# Patient Record
Sex: FEMALE | Race: WHITE | NOT HISPANIC OR LATINO | Employment: OTHER | URBAN - METROPOLITAN AREA
[De-identification: names, ages, dates, MRNs, and addresses within clinical notes are randomized per-mention and may not be internally consistent; named-entity substitution may affect disease eponyms.]

---

## 2017-03-23 ENCOUNTER — TRANSFERRED RECORDS (OUTPATIENT)
Dept: HEALTH INFORMATION MANAGEMENT | Facility: CLINIC | Age: 67
End: 2017-03-23

## 2017-03-23 ENCOUNTER — RECORDS - HEALTHEAST (OUTPATIENT)
Dept: LAB | Facility: CLINIC | Age: 67
End: 2017-03-23

## 2017-03-23 LAB
CHOLEST SERPL-MCNC: 212 MG/DL
FASTING STATUS PATIENT QL REPORTED: ABNORMAL
HDLC SERPL-MCNC: 76 MG/DL
LDLC SERPL CALC-MCNC: 121 MG/DL
TRIGL SERPL-MCNC: 77 MG/DL

## 2018-03-14 ENCOUNTER — RECORDS - HEALTHEAST (OUTPATIENT)
Dept: LAB | Facility: CLINIC | Age: 68
End: 2018-03-14

## 2018-03-14 LAB
CHOLEST SERPL-MCNC: 220 MG/DL
FASTING STATUS PATIENT QL REPORTED: ABNORMAL
HDLC SERPL-MCNC: 68 MG/DL
LDLC SERPL CALC-MCNC: 136 MG/DL
TRIGL SERPL-MCNC: 78 MG/DL

## 2018-04-05 ENCOUNTER — TRANSFERRED RECORDS (OUTPATIENT)
Dept: HEALTH INFORMATION MANAGEMENT | Facility: CLINIC | Age: 68
End: 2018-04-05

## 2018-08-16 ENCOUNTER — RECORDS - HEALTHEAST (OUTPATIENT)
Dept: ADMINISTRATIVE | Facility: OTHER | Age: 68
End: 2018-08-16

## 2018-09-13 ENCOUNTER — RECORDS - HEALTHEAST (OUTPATIENT)
Dept: LAB | Facility: CLINIC | Age: 68
End: 2018-09-13

## 2018-09-13 LAB
CHOLEST SERPL-MCNC: 254 MG/DL
FASTING STATUS PATIENT QL REPORTED: ABNORMAL
HDLC SERPL-MCNC: 76 MG/DL
LDLC SERPL CALC-MCNC: 166 MG/DL
TRIGL SERPL-MCNC: 62 MG/DL
TSH SERPL DL<=0.005 MIU/L-ACNC: 0.87 UIU/ML (ref 0.3–5)

## 2019-03-07 ENCOUNTER — RECORDS - HEALTHEAST (OUTPATIENT)
Dept: LAB | Facility: CLINIC | Age: 69
End: 2019-03-07

## 2019-03-07 LAB
CHOLEST SERPL-MCNC: 194 MG/DL
FASTING STATUS PATIENT QL REPORTED: NORMAL
HDLC SERPL-MCNC: 69 MG/DL
LDLC SERPL CALC-MCNC: 101 MG/DL
TRIGL SERPL-MCNC: 118 MG/DL

## 2019-04-18 ENCOUNTER — TRANSFERRED RECORDS (OUTPATIENT)
Dept: HEALTH INFORMATION MANAGEMENT | Facility: CLINIC | Age: 69
End: 2019-04-18

## 2019-09-10 ENCOUNTER — RECORDS - HEALTHEAST (OUTPATIENT)
Dept: LAB | Facility: CLINIC | Age: 69
End: 2019-09-10

## 2019-09-10 LAB
CHOLEST SERPL-MCNC: 231 MG/DL
FASTING STATUS PATIENT QL REPORTED: ABNORMAL
HDLC SERPL-MCNC: 79 MG/DL
LDLC SERPL CALC-MCNC: 134 MG/DL
TRIGL SERPL-MCNC: 89 MG/DL

## 2019-09-11 LAB — HCV AB SERPL QL IA: NEGATIVE

## 2019-09-25 ENCOUNTER — RECORDS - HEALTHEAST (OUTPATIENT)
Dept: LAB | Facility: CLINIC | Age: 69
End: 2019-09-25

## 2019-09-27 LAB — BACTERIA SPEC CULT: NORMAL

## 2020-05-04 ENCOUNTER — TRANSFERRED RECORDS (OUTPATIENT)
Dept: HEALTH INFORMATION MANAGEMENT | Facility: CLINIC | Age: 70
End: 2020-05-04

## 2020-09-16 ENCOUNTER — RECORDS - HEALTHEAST (OUTPATIENT)
Dept: LAB | Facility: CLINIC | Age: 70
End: 2020-09-16

## 2020-09-16 LAB
ANION GAP SERPL CALCULATED.3IONS-SCNC: 9 MMOL/L (ref 5–18)
BUN SERPL-MCNC: 12 MG/DL (ref 8–28)
CALCIUM SERPL-MCNC: 9.9 MG/DL (ref 8.5–10.5)
CHLORIDE BLD-SCNC: 106 MMOL/L (ref 98–107)
CHOLEST SERPL-MCNC: 228 MG/DL
CO2 SERPL-SCNC: 26 MMOL/L (ref 22–31)
CREAT SERPL-MCNC: 0.81 MG/DL (ref 0.6–1.1)
FASTING STATUS PATIENT QL REPORTED: ABNORMAL
GFR SERPL CREATININE-BSD FRML MDRD: >60 ML/MIN/1.73M2
GLUCOSE BLD-MCNC: 68 MG/DL (ref 70–125)
HDLC SERPL-MCNC: 81 MG/DL
LDLC SERPL CALC-MCNC: 132 MG/DL
POTASSIUM BLD-SCNC: 4.7 MMOL/L (ref 3.5–5)
SODIUM SERPL-SCNC: 141 MMOL/L (ref 136–145)
TRIGL SERPL-MCNC: 75 MG/DL

## 2020-11-16 ENCOUNTER — RECORDS - HEALTHEAST (OUTPATIENT)
Dept: ADMINISTRATIVE | Facility: OTHER | Age: 70
End: 2020-11-16

## 2020-11-18 ENCOUNTER — RECORDS - HEALTHEAST (OUTPATIENT)
Dept: RADIOLOGY | Facility: CLINIC | Age: 70
End: 2020-11-18

## 2020-11-18 ENCOUNTER — AMBULATORY - HEALTHEAST (OUTPATIENT)
Dept: NEUROSURGERY | Facility: CLINIC | Age: 70
End: 2020-11-18

## 2020-12-01 ENCOUNTER — OFFICE VISIT - HEALTHEAST (OUTPATIENT)
Dept: NEUROSURGERY | Facility: CLINIC | Age: 70
End: 2020-12-01

## 2020-12-01 DIAGNOSIS — I67.1 NONRUPTURED CEREBRAL ANEURYSM: ICD-10-CM

## 2020-12-01 ASSESSMENT — MIFFLIN-ST. JEOR: SCORE: 1196.74

## 2020-12-16 ENCOUNTER — RECORDS - HEALTHEAST (OUTPATIENT)
Dept: RADIOLOGY | Facility: CLINIC | Age: 70
End: 2020-12-16

## 2020-12-16 ENCOUNTER — RECORDS - HEALTHEAST (OUTPATIENT)
Dept: ADMINISTRATIVE | Facility: OTHER | Age: 70
End: 2020-12-16

## 2021-05-05 ENCOUNTER — TRANSFERRED RECORDS (OUTPATIENT)
Dept: HEALTH INFORMATION MANAGEMENT | Facility: CLINIC | Age: 71
End: 2021-05-05

## 2021-05-30 ENCOUNTER — RECORDS - HEALTHEAST (OUTPATIENT)
Dept: ADMINISTRATIVE | Facility: CLINIC | Age: 71
End: 2021-05-30

## 2021-05-31 ENCOUNTER — RECORDS - HEALTHEAST (OUTPATIENT)
Dept: ADMINISTRATIVE | Facility: CLINIC | Age: 71
End: 2021-05-31

## 2021-06-02 ENCOUNTER — RECORDS - HEALTHEAST (OUTPATIENT)
Dept: ADMINISTRATIVE | Facility: CLINIC | Age: 71
End: 2021-06-02

## 2021-06-05 VITALS
HEIGHT: 67 IN | WEIGHT: 142 LBS | SYSTOLIC BLOOD PRESSURE: 107 MMHG | DIASTOLIC BLOOD PRESSURE: 51 MMHG | BODY MASS INDEX: 22.29 KG/M2 | HEART RATE: 83 BPM | OXYGEN SATURATION: 95 %

## 2021-06-13 NOTE — PROGRESS NOTES
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  She has a question of a infundibulum of the ACOMM versus a tiny aneurysm there as well.  Patient denies any tobacco abuse family history of cerebral aneurysms, intracranial hemorrhages, homocystinuria, connective-tissue disorders, autosomal dominant polycystic kidney disease, or Marfan's disease.    On physical exam patient is very pleasant and appropriate  Speech is clear fluent and appropriate  Face is symmetric throughout the forehead eyes and mouth  Extraocular muscles are intact bilaterally  Tongue and uvula elevate in the midline  Strength is full throughout the upper and lower extremities  Gait is nonantalgic    Assessment and plan:  Cortney very pleasant 70-year-old with a small finding of aneurysm as detailed above.  I reviewed the natural history of cerebral aneurysms with Cortney as well as the risks of rupture and morbidity and risk factors of rupture including hypertension.  I did review treatment options including surveillance monitoring versus coil embolization versus clipping.  The location of small size of her aneurysm and recommend surveillance monitoring with annual MRAs.  Thank you for giving me the opportunity to see this very pleasant patient.  If you have any questions about her or any other patients please feel free to contact me.  Of note I spent greater than 45 minutes counseling the patient regarding her pathology and treatment plan, greater than 50 percent of which was in direct counseling.    Annabelle Oropeza MD, FAANS

## 2021-06-13 NOTE — PROGRESS NOTES
Neurosurgery consultation was requested by: Dr. Gillespie for cerebral aneurysm. Pt went in for MRI for vertigo and had incidental finding of aneurysm. Pt denies HA or visual changes. Vertigo is gone.   JSkingston,CMA

## 2021-10-13 ENCOUNTER — LAB REQUISITION (OUTPATIENT)
Dept: LAB | Facility: CLINIC | Age: 71
End: 2021-10-13

## 2021-10-13 DIAGNOSIS — E78.2 MIXED HYPERLIPIDEMIA: ICD-10-CM

## 2021-10-13 DIAGNOSIS — R53.83 OTHER FATIGUE: ICD-10-CM

## 2021-10-13 LAB
CHOLEST SERPL-MCNC: 201 MG/DL
FASTING STATUS PATIENT QL REPORTED: ABNORMAL
HDLC SERPL-MCNC: 82 MG/DL
LDLC SERPL CALC-MCNC: 108 MG/DL
TRIGL SERPL-MCNC: 54 MG/DL
TSH SERPL DL<=0.005 MIU/L-ACNC: 0.98 UIU/ML (ref 0.3–5)

## 2021-10-13 PROCEDURE — 84443 ASSAY THYROID STIM HORMONE: CPT | Performed by: FAMILY MEDICINE

## 2021-10-13 PROCEDURE — 86618 LYME DISEASE ANTIBODY: CPT | Performed by: FAMILY MEDICINE

## 2021-10-13 PROCEDURE — 80061 LIPID PANEL: CPT | Performed by: FAMILY MEDICINE

## 2021-10-13 ASSESSMENT — MIFFLIN-ST. JEOR: SCORE: 1192.3

## 2021-10-14 LAB — B BURGDOR IGG+IGM SER QL: 0.03

## 2021-11-23 ENCOUNTER — VIRTUAL VISIT (OUTPATIENT)
Dept: PHARMACY | Facility: PHYSICIAN GROUP | Age: 71
End: 2021-11-23
Payer: COMMERCIAL

## 2021-11-23 VITALS
HEART RATE: 74 BPM | HEIGHT: 67 IN | SYSTOLIC BLOOD PRESSURE: 100 MMHG | DIASTOLIC BLOOD PRESSURE: 70 MMHG | BODY MASS INDEX: 22.44 KG/M2 | WEIGHT: 143 LBS

## 2021-11-23 DIAGNOSIS — F32.A DEPRESSION, UNSPECIFIED DEPRESSION TYPE: ICD-10-CM

## 2021-11-23 DIAGNOSIS — E78.5 HYPERLIPIDEMIA LDL GOAL <100: ICD-10-CM

## 2021-11-23 DIAGNOSIS — N95.1 MENOPAUSAL SYNDROME (HOT FLASHES): Primary | ICD-10-CM

## 2021-11-23 DIAGNOSIS — Z78.9 TAKES DIETARY SUPPLEMENTS: ICD-10-CM

## 2021-11-23 PROCEDURE — 99605 MTMS BY PHARM NP 15 MIN: CPT | Performed by: PHARMACIST

## 2021-11-23 PROCEDURE — 99607 MTMS BY PHARM ADDL 15 MIN: CPT | Performed by: PHARMACIST

## 2021-11-23 RX ORDER — LUTEIN 10 MG
10 TABLET ORAL DAILY
COMMUNITY
End: 2024-05-01

## 2021-11-23 RX ORDER — ACETAMINOPHEN 500 MG
500-1000 TABLET ORAL EVERY 6 HOURS PRN
COMMUNITY
End: 2022-02-08

## 2021-11-23 RX ORDER — COVID-19 ANTIGEN TEST
220 KIT MISCELLANEOUS 2 TIMES DAILY PRN
COMMUNITY
End: 2022-02-08

## 2021-11-23 NOTE — PROGRESS NOTES
Medication Therapy Management (MTM) Encounter    ASSESSMENT:                            Medication Adherence/Access: No issues identified    Hot flashes: Unclear if her hot flashes are from menopause.  She appears to have gone through menopause in her mid 50's, so think the night sweats/hot flashes could be a side effect of her paroxetine.  See depression plan.  If switching to SNRI not successful, could consider adding gabapentin and titrating up.  She did find black cohosh somewhat helpful in the past, could consider trying this again until out next apt.  Would be safe and doesn't interact with any other meds she is taking.    Depression:  Paroxetine has been effective for depression, but possible contributing to her night sweats/hot flashes.  She would prefer to be on the fewest number of medications possible, so switching to another selective serotonin reuptake inhibitor or SNRI would be preferred to adding another medication.   It would be helpful to do the [employer] pharmacogenetic test if not expensive to help guide therapy, given trial on several antidepressants in the past.  One possible options would be desvenlafaxine, which looks like it's covered.  Discussed when to consider this medication transition, and due to possible worsening in mood during transition would prefer to wait until after Dio.  Will try doing the Oneome test now. Called [employer] to confirm, and she would not have a copy for the test.     Possible taper plan:  Week 1:  Decrease paroxetine to 10 mg once daily  Week 2:  Stop Paroxetine.  Start Desvenlafaxine 25 mg once daily.    Week 3:  Increase Desvenlafaxine to 50 mg once daily.    Hyperlipidemia: Stable.  Patient is on low intensity statin which is indicated based on 2019 ACC/AHA guidelines for lipid management.      Pain:  Stable.    Supplements/OTCs:  Stable.      PLAN:                            1.  We will continue your current medications for now.    2.  You could try black  cohosh 40-80 mg once daily again to see if this helps with the hot flashes at night.    3.  I checked with the [employer] genetic test company, and the genetic test we talked about would be $0 for you.  I will havePos have the test mailed to you.  Please complete the test at home, register the test kit with them online or by phone, and then mail the test back to them.     This test helps us look at how your medication processes/metabolizes different medications, and help us find options that would be better tolerated/more effective.     Future Considerations:  4.  We discussed trying to transition off the Paroxetine, and switch to another medication called Desvenlafaxine (Pristiq).  This would be a possible transition plan, but we can discuss it more on 1/4/2022 at our next follow up.  If you would like to try changing your medications before our follow up please let me know.    Week 1: Decrease Paroxetine to 10 mg at bedtime (has 40 mg tablets).  Week 2:  Stop Paroxetine. Start Desvenlafaxine 25 mg once daily.    Week 3:  Increase Desvenlafaxine to 50 mg once daily.      Follow-up: Return in 6 weeks (on 1/4/2022) for MTM Follow Up.      SUBJECTIVE/OBJECTIVE:                          Cortney Nagy is a 71 year old female called for an initial visit. She was referred to me from Dr. Gillepsie.      Reason for visit: Initial medication review.  Referral for help with vasomotor symptoms due to menopause, depression.    Allergies/ADRs: Reviewed in chart  Past Medical History: Reviewed in chart  Tobacco: She reports that she has never smoked. She has never used smokeless tobacco.  Alcohol: rare    Medication Adherence/Access: no issues reported  Doesn't use meclizine, was given this once in the hospital but it doesn't help.  Also hasn't been using premarin, asked for these to be removed from med list.    Hot flashes: She is having a lot of hot flashes, mainly overnight.  Has been going for 10-15 years.  She wakes up drenched  from her waist down, is completely wet. She wakes up like this frequently, several nights per week.  The last three nights have been horrible, was happening every night.  Her last period was 55.  She is frustrated that this is still happening.  She doesn't recall if the hot flashes were worse earlier this summer when she was on a higher dose of paroxetine 40 mg.  She did try black cohosh in her 50's and think it helped some with her hot flashes then.  Initially it seemed like they went away complete, although they did come back so longer term efficacy for her unclear.    Depression:  Current medications include: Paroxetine 20 mg at bedtime.  They had increased to 40 mg for awhile over the summer, but she felt like the lower dose was just as effective so she has been taking a 1/2 tab of the 40 mg for the last several weeks.  She thinks it has been working well for depression.  She has been doing very well with her depression the last couple years.  Goes on walks a few days a week, spends time with friends and family.  She is wondering if paroxetine could be contributing to her night sweats, is a possible side effect.  She also has dry eyes and constipation.  Reports some issues with short term memory, mostly word recall.  She has been on a many medications in the past for depression, but had decreased libido with most of them.     Past medications:  Fluoxetine - sexual side effects, was effective and helped with her anxiety/depression.  Citalopram- sexual side effects  Welbutrin- unsure what happened with this  Serzone- sedated.    Sertraline - sexual side effects, decreased libido  Effexor ER 75 mg - some sexual side effects.  Doesn't recall GI side effects, but thinks it wasn't working as well for her depression    PHQ 10/13/2021   PHQ-9 Total Score 0   Q9: Thoughts of better off dead/self-harm past 2 weeks Not at all       Hyperlipidemia: Current therapy includes Pravastatin 10 mg daily.  Patient reports no  significant myalgias or other side effects.  The 10-year ASCVD risk score (Christasofia LOMAX Jr., et al., 2013) is: 6.3%    Values used to calculate the score:      Age: 71 years      Sex: Female      Is Non- : No      Diabetic: No      Tobacco smoker: No      Systolic Blood Pressure: 100 mmHg      Is BP treated: No      HDL Cholesterol: 82 mg/dL      Total Cholesterol: 201 mg/dL  Recent Labs   Lab Test 10/13/21  1107 09/16/20  1008   CHOL 201* 228*   HDL 82 81    132*   TRIG 54 75     Pain:  Takes acetaminophen 500 mg as needed for pain, or Aleve 220 mg PRN.  Not using daily, just for general aches/pains.  Has some back back.    Supplements/OTCs: Taking Coenzyme Q-10 100 mg daily, Fish Oil 1000 mg daily, Calcium-magnesium- zinc once daily.  Denies issues.    ----------------      I spent 60 minutes with this patient today (an extra 15 minutes was spent creating the Medication Action Plan). All changes were made via collaborative practice agreement with Lynn Gillespie MD. A copy of the visit note was provided to the patient's primary care provider.    The patient was mailed a summary of these recommendations.     Melania Magallanes PharmD  Medication Therapy Management Pharmacist  Pager: [phone]      Telemedicine Visit Details  Type of service:  Telephone visit  Start Time: 10:00 AM  End Time: 11:00 AM  Originating Location (patient location): [place]  Distant Location (provider location):  [facility]     Medication Therapy Recommendations  Depression, unspecified depression type    Current Medication: PARoxetine (PAXIL) 40 MG tablet   Rationale: Medication requires monitoring - Needs additional monitoring   Recommendation: Order Lab   Status: Accepted per CPA   Note: [facility] test

## 2021-11-23 NOTE — LETTER
"        Date: 2021    Cortney Nagy  [address] [facility] [address]    Dear Ms. Nagy,    Thank you for talking with me on 2021 about your health and medications. Medicare s MTM (Medication Therapy Management) program helps you understand your medications and use them safely.      This letter includes an action plan (Medication Action Plan) and medication list (Personal Medication List). The action plan has steps you should take to help you get the best results from your medications. The medication list will help you keep track of your medications and how to use them the right way.       Have your action plan and medication list with you when you talk with your doctors, pharmacists, and other healthcare providers in your care team.     Ask your doctors, pharmacists, and other healthcare providers to update the action plan and medication list at every visit.     Take your medication list with you if you go to the hospital or emergency room.     Give a copy of the action plan and medication list to your family or caregivers.     If you want to talk about this letter or any of the papers with it, please call   [phone].We look forward to working with you, your doctors, and other healthcare providers to help you stay healthy through the [employer] [place] MTM program.    Sincerely,  Melania Magallanes RPH    Enclosed: Medication Action Plan and Personal Medication List    MEDICATION ACTION PLAN FOR Cortney Nagy,  1950     This action plan will help you get the best results from your medications if you:   1. Read \"What we talked about.\"   2. Take the steps listed in the \"What I need to do\" boxes.   3. Fill in \"What I did and when I did it.\"   4. Fill in \"My follow-up plan\" and \"Questions I want to ask.\"     Have this action plan with you when you talk with your doctors, pharmacists, and other healthcare providers in your care team. Share this with your family or " caregivers too.  DATE PREPARED: 2021  What we talked about: Possible options to help with your hot flashes                                                  What I need to do: Continue current medications for now.  You could try the black cohosh supplement again to see if this helps.  I will place an order for the [employer] Genetic test and have it mailed to your house.    At our next follow up on 2022 we can discuss possible transitioning off the paroxetine onto another medication called desvenlafaxine.  We will continue your medications the same for now, but the paroxetine might be causing the hot flashes/sweating at night.  We discussed holding off on this medication change due to the upcoming holidays.     What I did and when I did it:                                              My follow-up plan:                 Questions I want to ask:              If you have any questions about your action plan, call Melania Magallanes RPH, Phone: [phone] , Monday-Friday 8-4:30pm.           PERSONAL MEDICATION LIST FOR Cortney Nagy  1950     This medication list was made for you after we talked. We also used information from your doctor's chart.      Use blank rows to add new medications. Then fill in the dates you started using them.    Cross out medications when you no longer use them. Then write the date and why you stopped using them.    Ask your doctors, pharmacists, and other healthcare providers to update this list at every visit. Keep this list up-to-date with:       Prescription medications    Over the counter drugs     Herbals    Vitamins    Minerals      If you go to the hospital or emergency room, take this list with you. Share this with your family or caregivers too.     DATE PREPARED: 2021  Allergies or side effects: Codeine, Penicillins, Propoxyphene n-acetaminophen [propoxyphene n-apap], and Sulfa (sulfonamide antibiotics) [sulfa drugs]     Medication:  ACETAMINOPHEN 500 MG PO  TABS      How I use it:  Take 500-1,000 mg by mouth every 6 hours as needed for mild pain      Why I use it:  Pain    Prescriber:  Yeni Rowland       Date I started using it:       Date I stopped using it:         Why I stopped using it:            Medication:  CALCIUM-MAGNESIUM-ZINC 333-133-5 MG PO TABS      How I use it:  Take 1 tablet by mouth daily      Why I use it:  General Health    Prescriber:  Yeni Rowland      Date I started using it:       Date I stopped using it:         Why I stopped using it:            Medication:  LUTEIN 10 MG PO TABS      How I use it:  Take 10 mg by mouth daily      Why I use it: [facility]    Prescriber:  Nasimate Janett      Date I started using it:       Date I stopped using it:         Why I stopped using it:            Medication:  NAPROXEN SODIUM 220 MG PO CAPS      How I use it:  Take 220 mg by mouth 2 times daily as needed      Why I use it:  Pain    Prescriber:  Yeni Rowland      Date I started using it:       Date I stopped using it:         Why I stopped using it:            Medication:  PAROXETINE HCL 40 MG PO TABS      How I use it:  Take 20 mg by mouth daily       Why I use it:  Depression    Prescriber:  Yeni Rowland      Date I started using it:       Date I stopped using it:         Why I stopped using it:            Medication:  coenzyme Q10 100 mg capsule      How I use it:  Take 100 mg by mouth daily.      Why I use it:  General health    Prescriber:  Yeni Rowland      Date I started using it:       Date I stopped using it:         Why I stopped using it:            Medication:  omega-3/dha/epa/fish oil (FISH OIL-OMEGA-3 FATTY ACIDS) 300-1,000 mg capsule      How I use it:  Take 1 g by mouth daily.      Why I use it:  General Health    Prescriber:  Yeni Rowland      Date I started using it:       Date I stopped using it:         Why I stopped using it:            Medication:  pravastatin (PRAVACHOL) 20 MG tablet      How I use it:   Take 10 mg by mouth  at bedtime. Every other day             Why I use it:  Cholesterol    Prescriber:  Yeni Rowland      Date I started using it:       Date I stopped using it:         Why I stopped using it:            Medication:         How I use it:         Why I use it:      Prescriber:         Date I started using it:       Date I stopped using it:         Why I stopped using it:            Medication:         How I use it:         Why I use it:      Prescriber:         Date I started using it:       Date I stopped using it:         Why I stopped using it:            Medication:         How I use it:         Why I use it:      Prescriber:         Date I started using it:       Date I stopped using it:         Why I stopped using it:              Other Information:     If you have any questions about your medication list, call Melania Magallanes RPH, Phone: [phone] , Monday-Friday 8-4:30pm.    According to the Paperwork Reduction Act of 1995, no persons are required to respond to a collection of information unless it displays a valid OMB control number. The valid OMB number for this information collection is 0697-0203. The time required to complete this information collection is estimated to average 40 minutes per response, including the time to review instructions, searching existing data resources, gather the data needed, and complete and review the information collection. If you have any comments concerning the accuracy of the time estimate(s) or suggestions for improving this form, please write to: CMS, Attn: PAR[employer] Reports Clearance Officer, [address] [place] [id number].

## 2021-11-24 ASSESSMENT — PATIENT HEALTH QUESTIONNAIRE - PHQ9: SUM OF ALL RESPONSES TO PHQ QUESTIONS 1-9: 0

## 2021-11-24 NOTE — PATIENT INSTRUCTIONS
Recommendations from today's MTM visit:                                                    MTM (medication therapy management) is a service provided by a clinical pharmacist designed to help you get the most of out of your medicines.   Today we reviewed what your medicines are for, how to know if they are working, that your medicines are safe and how to make your medicine regimen as easy as possible.      1.  We will continue your current medications for now.    2.  You could try black cohosh 40-80 mg once daily again to see if this helps with the hot flashes at night.    3.  I checked with the [employer] genetic test company, and the genetic test we talked about would be $0 for you.  I will have have the test mailed to you.  Please complete the test at home, register the test kit with them online or by phone, and then mail the test back to them.     This test helps us look at how your medication processes/metabolizes different medications, and help us find options that would be better tolerated/more effective.     Future Considerations:  4.  We discussed trying to transition off the Paroxetine, and switch to another medication called Desvenlafaxine (Pristiq).  This would be a possible transition plan, but we can discuss it more on 1/4/2022 at our next follow up.  If you would like to try changing your medications before our follow up please let me know.    Week 1: Decrease Paroxetine to 10 mg at bedtime (has 40 mg tablets).  Week 2:  Stop Paroxetine. Start Desvenlafaxine 25 mg once daily.    Week 3:  Increase Desvenlafaxine to 50 mg once daily.      Follow-up: Return in 6 weeks (on 1/4/2022) for MTM Follow Up.  Please call me sooner if you have any concerns, questions, or would like to start the paroxetine transition sooner than our 1/4 appointment!    It was great to speak with you today.  I value your experience and would be very thankful for your time with providing feedback on our clinic survey. You may receive a  survey via email or text message in the next few days.     To schedule another MTM appointment, please call the clinic directly or you may call the MTM scheduling line at [phone] or toll-free at [phone].     My Clinical Pharmacist's contact information:                                                      Please feel free to contact me with any questions or concerns you have.     Melania Magallanes PharmD  Medication Therapy Management Pharmacist  Pager: [phone]

## 2021-11-30 DIAGNOSIS — I67.1 NONRUPTURED CEREBRAL ANEURYSM: Primary | ICD-10-CM

## 2021-12-08 ENCOUNTER — LAB REQUISITION (OUTPATIENT)
Dept: LAB | Facility: CLINIC | Age: 71
End: 2021-12-08

## 2021-12-08 DIAGNOSIS — E78.2 MIXED HYPERLIPIDEMIA: ICD-10-CM

## 2021-12-08 LAB
ANION GAP SERPL CALCULATED.3IONS-SCNC: 12 MMOL/L (ref 5–18)
BUN SERPL-MCNC: 12 MG/DL (ref 8–28)
CALCIUM SERPL-MCNC: 9.7 MG/DL (ref 8.5–10.5)
CHLORIDE BLD-SCNC: 103 MMOL/L (ref 98–107)
CO2 SERPL-SCNC: 25 MMOL/L (ref 22–31)
CREAT SERPL-MCNC: 0.81 MG/DL (ref 0.6–1.1)
GFR SERPL CREATININE-BSD FRML MDRD: 73 ML/MIN/1.73M2
GLUCOSE BLD-MCNC: 79 MG/DL (ref 70–125)
POTASSIUM BLD-SCNC: 4.5 MMOL/L (ref 3.5–5)
SODIUM SERPL-SCNC: 140 MMOL/L (ref 136–145)

## 2021-12-08 PROCEDURE — 80048 BASIC METABOLIC PNL TOTAL CA: CPT | Performed by: PHYSICIAN ASSISTANT

## 2021-12-20 ENCOUNTER — LAB REQUISITION (OUTPATIENT)
Dept: LAB | Facility: CLINIC | Age: 71
End: 2021-12-20

## 2021-12-20 DIAGNOSIS — R35.0 FREQUENCY OF MICTURITION: ICD-10-CM

## 2021-12-20 PROCEDURE — 87086 URINE CULTURE/COLONY COUNT: CPT | Performed by: PHYSICIAN ASSISTANT

## 2021-12-21 LAB — BACTERIA UR CULT: ABNORMAL

## 2022-01-05 ENCOUNTER — VIRTUAL VISIT (OUTPATIENT)
Dept: PHARMACY | Facility: PHYSICIAN GROUP | Age: 72
End: 2022-01-05
Payer: COMMERCIAL

## 2022-01-05 ENCOUNTER — TELEPHONE (OUTPATIENT)
Dept: NEUROSURGERY | Facility: CLINIC | Age: 72
End: 2022-01-05
Payer: COMMERCIAL

## 2022-01-05 DIAGNOSIS — Z78.9 TAKES DIETARY SUPPLEMENTS: ICD-10-CM

## 2022-01-05 DIAGNOSIS — N95.1 MENOPAUSAL SYNDROME (HOT FLASHES): Primary | ICD-10-CM

## 2022-01-05 DIAGNOSIS — E78.5 HYPERLIPIDEMIA LDL GOAL <100: ICD-10-CM

## 2022-01-05 DIAGNOSIS — F32.A DEPRESSION, UNSPECIFIED DEPRESSION TYPE: ICD-10-CM

## 2022-01-05 PROCEDURE — 99605 MTMS BY PHARM NP 15 MIN: CPT | Performed by: PHARMACIST

## 2022-01-05 PROCEDURE — 99607 MTMS BY PHARM ADDL 15 MIN: CPT | Performed by: PHARMACIST

## 2022-01-05 RX ORDER — DESVENLAFAXINE 25 MG/1
25 TABLET, EXTENDED RELEASE ORAL DAILY
COMMUNITY
End: 2022-02-08

## 2022-01-05 NOTE — LETTER
_  Medication List        Prepared on: 1/5/2022     Bring your Medication List when you go to the doctor, hospital, or   emergency room. And, share it with your family or caregivers.     Note any changes to how you take your medications.  Cross out medications when you no longer use them.    Medication How I take it Why I use it Prescriber   acetaminophen (TYLENOL) 500 MG tablet Take 500-1,000 mg by mouth every 6 hours as needed for mild pain Pain Dr. Gillespie   Calcium-Magnesium-Zinc 333-133-5 MG TABS per tablet Take 1 tablet by mouth daily General Health Dr. Gillespie   coenzyme Q10 100 mg capsule Take 100 mg by mouth daily. General Health Dr. Gillespie   desvenlafaxine succinate (PRISTIQ) 25 MG 24 hr tablet Take 25 mg by mouth daily Depression Dr. Gillespie   Lutein 10 MG TABS Take 10 mg by mouth daily General Health Dr. Gillespie   naproxen sodium 220 MG capsule Take 220 mg by mouth 2 times daily as needed Pain Dr. Gillespie   omega-3/dha/epa/fish oil (FISH OIL-OMEGA-3 FATTY ACIDS) 300-1,000 mg capsule  Take 1 g by mouth daily. General Health Dr. Gillespie   pravastatin (PRAVACHOL) 20 MG tablet  Take 10 mg by mouth at bedtime. Every other day        High Cholesterol Dr. Gillespie         Add new medications, over-the-counter drugs, herbals, vitamins, or  minerals in the blank rows below.    Medication How I take it Why I use it Prescriber                          Allergies:      codeine; penicillins; propoxyphene n-acetaminophen [propoxyphene n-apap]; sulfa (sulfonamide antibiotics) [sulfa drugs]        Side effects I have had:              Other Information:             My notes and questions:

## 2022-01-05 NOTE — TELEPHONE ENCOUNTER
Patient would like a call back to discuss the order that was placed for the angiogram.     Best number to reach her: [phone]

## 2022-01-05 NOTE — TELEPHONE ENCOUNTER
Called Cortney who inquired about sending her all medical records - the number was provided.  Nicolasa Grider RN, CNRN

## 2022-01-05 NOTE — LETTER
January 5, 2022  Cortney Nagy  [address] [facility] [address] [address]    Dear Ms. Nagy, [facility]        Thank you for talking with me on Jan 5, 2022 about your health and medications. As a follow-up to our conversation, I have included two documents:      1. Your Recommended To-Do List has steps you should take to get the best results from your medications.  2. Your Medication List will help you keep track of your medications and how to take them.    If you want to talk about these documents, please call Melanai Magallanes RPH at phone: [phone], Monday-Friday 8-4:30pm.    I look forward to working with you and your doctors to make sure your medications work well for you.    Sincerely,    Melania Magallanes RPH

## 2022-01-05 NOTE — LETTER
January 5, 2022  Cortney Nagy  [address] [facility] [address]    Dear Ms. Nagy, [facility]        Thank you for talking with me on Jan 5, 2022 about your health and medications. As a follow-up to our conversation, I have included two documents:      1. Your Recommended To-Do List has steps you should take to get the best results from your medications.  2. Your Medication List will help you keep track of your medications and how to take them.    If you want to talk about these documents, please call Melania Magallanes RPH at phone: [phone], Monday-Friday 8-4:30pm.    I look forward to working with you and your doctors to make sure your medications work well for you.    Sincerely,    Melania Magallanes RPH

## 2022-01-05 NOTE — LETTER
"Recommended To-Do List      Prepared on: 1/5/2022     You can get the best results from your medications by completing the items on this \"To-Do List.\"      Bring your To-Do List when you go to your doctor. And, share it with your family or caregivers.    My To-Do List:  What we talked about: What I should do:   An issue with your medication    Change the medication you are taking from PARoxetine (PAXIL) to desvenlafaxine succinate (PRISTIQ)  Week 1:  Decrease paroxetine to 10 mg once daily (1/4 tablet daily)  Week 2:  Stop Paroxetine.  Start Desvenlafaxine 25 mg once daily.                What we talked about: What I should do:                   "

## 2022-01-05 NOTE — LETTER
January 5, 2022  Cortney Nagy  [address] [facility] [address]    Dear Ms. Nagy, [facility]        Thank you for talking with me on Jan 5, 2022 about your health and medications. As a follow-up to our conversation, I have included two documents:      1. Your Recommended To-Do List has steps you should take to get the best results from your medications.  2. Your Medication List will help you keep track of your medications and how to take them.    If you want to talk about these documents, please call Melania Magallanes RPH at phone: [phone], Monday-Friday 8-4:30pm.    I look forward to working with you and your doctors to make sure your medications work well for you.    Sincerely,    Melania Magallanes RPH

## 2022-01-05 NOTE — LETTER
"Recommended To-Do List      Prepared on: 1/5/2022     You can get the best results from your medications by completing the items on this \"To-Do List.\"      Bring your To-Do List when you go to your doctor. And, share it with your family or caregivers.    My To-Do List:  What we talked about: What I should do:   An issue with your medication    Change the medication you are taking from PARoxetine (PAXIL) to desvenlafaxine succinate (PRISTIQ)  Transition plan:  Week 1: Decrease Paroxetine to 10 mg at bedtime (has 40 mg tablets).  Week 2:  Stop Paroxetine. Start Desvenlafaxine 25 mg once daily.           What we talked about: What I should do:                   "

## 2022-01-05 NOTE — PROGRESS NOTES
Medication Therapy Management (MTM) Encounter    ASSESSMENT:                            Medication Adherence/Access: No issues identified    Hot flashes: Unclear if her hot flashes are from menopause.  She appears to have gone through menopause in her mid 50's, so the night sweats/hot flashes could be a side effect of her paroxetine.  See depression plan.  If switching to SNRI not successful, could consider adding gabapentin and titrating up.       Depression:  Paroxetine has been effective for depression, but possibly contributing to her night sweats/hot flashes and worsening memory.  She would like to switch to something else.  Reviewed the Oneome test results with the patient.  Has tried several meds with side effects.  May be beneficial to try SNRI to help with depression and hot flashes.  If not tolerating could consider bupropion for depression (lower risk of sexual side effects).  Discussed possible side effect with desvenlafaxine- nausea, dry mouth, dizziness, and decreased libido.    Possible taper plan:  Week 1:  Decrease paroxetine to 10 mg once daily (pt tried cutting into 1/4 tablets and didn't have issues with this)  Week 2:  Stop Paroxetine.  Start Desvenlafaxine 25 mg once daily.    Week 3:  Increase Desvenlafaxine to 50 mg once daily.     Hyperlipidemia: Stable.  Patient is on low intensity statin which is indicated based on 2019 ACC/AHA guidelines for lipid management.  we could consider trial off pravastatin in the future, to see if her      Pain:  Stable.     Supplements/OTCs:  Stable.      PLAN:                            1.  Recommend transitioning from paroxetine to desvenlafaxine (Pristiq) for depression.    Transition plan:  Week 1: Decrease Paroxetine to 10 mg at bedtime (has 40 mg tablets).  Week 2:  Stop Paroxetine. Start Desvenlafaxine 25 mg once daily.    Week 3:  Increase Desvenlafaxine to 50 mg once daily.    Follow-up: Return in 1 month (on 2/8/2022) for MTM Follow Up, Diabetes  Follow Up.    SUBJECTIVE/OBJECTIVE:                          Cortney Nagy is a 71 year old female called for a follow-up visit. She was referred to me from Dr. Gillespie.  Today's visit is a follow-up MTM visit from 21    Reason for visit: Follow-up on depression and anxiety.  Also reviewing results from the Oneome pharmacodynamic test.     Allergies/ADRs: Reviewed in chart  Past Medical History: Reviewed in chart  Tobacco: She reports that she has never smoked. She has never used smokeless tobacco.  Alcohol: rare     Medication Adherence/Access: no issues reported  Doesn't use meclizine, was given this once in the hospital but it doesn't help.  Also hasn't been using premarin, asked for these to be removed from med list.    She is having some trouble with remembering words, feels like her short term memory isn't as good.  She is wondering if this could be from the paroxetine or pravastatin.  Reviewed Oneome results with patient.  Discussed that results will not tell us which drugs will work but can give us some insight into dosing and side effects based on individual metabolism of each medication.   Given current and previous therapy, notable results include:     Pharmacogenetic Results:  OneOme testing conducted and reported on .        Gene Phenotype Current Medications Impacted   CY Rapid Metabolizer  None, primarily affects antipsychotics   CYP2D6 Intermediate Metabolizer  Paroxetine; patient likely has higher drug levels compared to others on current dose.  Adjustments not needed based on this information, but can adjust therapy if needed based on efficacy/side effects   CY Poor Metabolizer None, affects some statins, pain medications, antipsychotics, and urology medications.   CYP4F2 Reduced Activity  None, affects warfarin metabolism   MTHFR Reduced Activity  may have decreased conversion of folic acid to active form of l-methylfolate   COMT Low Activity None, affects stimulants and  warfarin   NUDT15 Intermediate Metabolizer None, increased risk for thiopurine medications   WZWV7S9 Decreased function Pravastatin; increased risk of side effects with this medication.  Not currently an issue but will monitor.  Could consider changing to rosuvastatin if needed   UGT1A1 Intermediate Metabolizer  None, increased risk of side effects with certain oncology and hematology medications       Interpretations:  - Patient likely metabolizes paroxetine more slowly than others, and may have higher drug levels at current dose then other patients.  No need to change dose based on this information, only on her response/side effects.  -  She may be less likely to find certain pain meds effects (I.e. alfentanyl, codeine, tramadol).      Hot flashes: She is having a lot of hot flashes, mainly overnight.  Has been going for 10-15 years.  She wakes up drenched from her waist down, is completely wet. She wakes up like this frequently, several nights per week.  The last three nights have been horrible, was happening every night.  Her last period was 55.  She is frustrated that this is still happening.  She doesn't recall if the hot flashes were worse earlier this summer when she was on a higher dose of paroxetine 40 mg.  She did try black cohosh in her 50's and think it helped some with her hot flashes then.  Initially it seemed like they went away complete, although they did come back so longer term efficacy for her unclear.     Depression:  Current medications include: Paroxetine 20 mg at bedtime.    She has been doing very well with her depression the last couple years.  Goes on walks a few days a week, spends time with friends and family.  She had a difficult time at [place].  She said she sometimes over reacts to things, so had some difficulty.    She is wondering if paroxetine could be contributing to her night sweats and memory issues, is a possible side effect.  She also has dry eyes and constipation.  Reports  some issues with short term memory, mostly word recall.  She has been on a many medications in the past for depression, but had decreased libido with most of them.  She has concerned not taking any medications for her depression, and would prefer not to take any medications.     Past medications:  Fluoxetine - sexual side effects, was effective and helped with her anxiety/depression.  It worked very well.  Citalopram- sexual side effects  Welbutrin- unsure what happened with this  Serzone- sedated.    Sertraline - sexual side effects, decreased libido  Effexor ER 75 mg - some sexual side effects.  Doesn't recall GI side effects, but thinks it wasn't working as well for her depression     PHQ 10/13/2021   PHQ-9 Total Score 0   Q9: Thoughts of better off dead/self-harm past 2 weeks Not at all      Hyperlipidemia: Current therapy includes Pravastatin 10 mg daily.  Patient reports no significant myalgias or other side effects.  Patient is wondering if the pravastatin is affecting her memory.  The 10-year ASCVD risk score (Christa DAMI Jr., et al., 2013) is: 6.3%    Values used to calculate the score:      Age: 71 years      Sex: Female      Is Non- : No      Diabetic: No      Tobacco smoker: No      Systolic Blood Pressure: 100 mmHg      Is BP treated: No      HDL Cholesterol: 82 mg/dL      Total Cholesterol: 201 mg/dL       Recent Labs   Lab Test 10/13/21  1107 09/16/20  1008   CHOL 201* 228*   HDL 82 81    132*   TRIG 54 75      Pain:  Takes acetaminophen 500 mg as needed for pain, or Aleve 220 mg PRN.  Not using daily, just for general aches/pains.  Has some back back.     Supplements/OTCs: Taking Coenzyme Q-10 100 mg daily, Fish Oil 1000 mg daily, Calcium-magnesium-zinc once daily.  Denies issues.    ----------------      I spent 60 minutes with this patient today. All changes were made via collaborative practice agreement with Lynn Gillespie MD. A copy of the visit note was provided to the  patient's primary care provider.    The patient was mailed a summary of these recommendations.     Melania Magallanes PharmD  Medication Therapy Management Pharmacist  Pager: [phone]      Telemedicine Visit Details  Type of service:  Telephone visit  Start Time: 10:00  End Time: 10:58 AM  Originating Location (patient location): [place]  Distant Location (provider location):  [facility]     Medication Therapy Recommendations  Depression, unspecified depression type    Current Medication: PARoxetine (PAXIL) 40 MG tablet   Rationale: Undesirable effect - Adverse medication event - Safety   Recommendation: Change Medication - desvenlafaxine succinate 25 MG 24 hr tablet   Status: Accepted per CPA              I spent 60 minutes with this patient today (an extra 15 minutes was spent creating the Medication Action Plan).

## 2022-01-05 NOTE — LETTER
_  Medication List        Prepared on: 1/5/2022     Bring your Medication List when you go to the doctor, hospital, or   emergency room. And, share it with your family or caregivers.     Note any changes to how you take your medications.  Cross out medications when you no longer use them.    Medication How I take it Why I use it Prescriber   acetaminophen (TYLENOL) 500 MG tablet Take 500-1,000 mg by mouth every 6 hours as needed for mild pain  Patient Reported   Calcium-Magnesium-Zinc 333-133-5 MG TABS per tablet Take 1 tablet by mouth daily  Patient Reported   coenzyme Q10 100 mg capsule [COENZYME Q10 100 MG CAPSULE] Take 100 mg by mouth daily.  Historical Provider   Lutein 10 MG TABS Take 10 mg by mouth daily  Patient Reported   naproxen sodium 220 MG capsule Take 220 mg by mouth 2 times daily as needed  Patient Reported   omega-3/dha/epa/fish oil (FISH OIL-OMEGA-3 FATTY ACIDS) 300-1,000 mg capsule [OMEGA-3/DHA/EPA/FISH OIL (FISH OIL-OMEGA-3 FATTY ACIDS) 300-1,000 MG CAPSULE] Take 1 g by mouth daily.  Historical Provider   PARoxetine (PAXIL) 40 MG tablet Take 20 mg by mouth daily   Historical Provider   pravastatin (PRAVACHOL) 20 MG tablet [PRAVASTATIN (PRAVACHOL) 20 MG TABLET] Take 10 mg by mouth at bedtime. Every other day         Historical Provider         Add new medications, over-the-counter drugs, herbals, vitamins, or  minerals in the blank rows below.    Medication How I take it Why I use it Prescriber                          Allergies:      codeine; penicillins; propoxyphene n-acetaminophen [propoxyphene n-apap]; sulfa (sulfonamide antibiotics) [sulfa drugs]        Side effects I have had:              Other Information:             My notes and questions:

## 2022-01-15 ENCOUNTER — HEALTH MAINTENANCE LETTER (OUTPATIENT)
Age: 72
End: 2022-01-15

## 2022-02-08 ENCOUNTER — LAB REQUISITION (OUTPATIENT)
Dept: LAB | Facility: CLINIC | Age: 72
End: 2022-02-08

## 2022-02-08 ENCOUNTER — OFFICE VISIT (OUTPATIENT)
Dept: PHARMACY | Facility: PHYSICIAN GROUP | Age: 72
End: 2022-02-08
Payer: COMMERCIAL

## 2022-02-08 VITALS
SYSTOLIC BLOOD PRESSURE: 100 MMHG | WEIGHT: 143 LBS | HEART RATE: 70 BPM | DIASTOLIC BLOOD PRESSURE: 72 MMHG | BODY MASS INDEX: 22.44 KG/M2 | HEIGHT: 67 IN

## 2022-02-08 DIAGNOSIS — M85.80 OTHER SPECIFIED DISORDERS OF BONE DENSITY AND STRUCTURE, UNSPECIFIED SITE: ICD-10-CM

## 2022-02-08 DIAGNOSIS — E78.5 HYPERLIPIDEMIA LDL GOAL <100: ICD-10-CM

## 2022-02-08 DIAGNOSIS — F32.A DEPRESSION, UNSPECIFIED DEPRESSION TYPE: ICD-10-CM

## 2022-02-08 DIAGNOSIS — N95.1 MENOPAUSAL SYNDROME (HOT FLASHES): Primary | ICD-10-CM

## 2022-02-08 DIAGNOSIS — Z78.9 TAKES DIETARY SUPPLEMENTS: ICD-10-CM

## 2022-02-08 PROCEDURE — 82306 VITAMIN D 25 HYDROXY: CPT | Performed by: FAMILY MEDICINE

## 2022-02-08 PROCEDURE — 99607 MTMS BY PHARM ADDL 15 MIN: CPT | Performed by: PHARMACIST

## 2022-02-08 PROCEDURE — 99606 MTMS BY PHARM EST 15 MIN: CPT | Performed by: PHARMACIST

## 2022-02-08 RX ORDER — AMOXICILLIN 500 MG
1200 CAPSULE ORAL DAILY
COMMUNITY
End: 2024-05-01

## 2022-02-08 RX ORDER — PAROXETINE 10 MG/1
10 TABLET, FILM COATED ORAL AT BEDTIME
COMMUNITY
End: 2024-09-05 | Stop reason: DRUGHIGH

## 2022-02-08 ASSESSMENT — MIFFLIN-ST. JEOR: SCORE: 1192.3

## 2022-02-08 NOTE — LETTER
February 8, 2022  Cortney Nagy  [address] [facility] [address]    Dear Ms. Nagy, [facility]        Thank you for talking with me on Feb 8, 2022 about your health and medications. As a follow-up to our conversation, I have included two documents:      1. Your Recommended To-Do List has steps you should take to get the best results from your medications.  2. Your Medication List will help you keep track of your medications and how to take them.    If you want to talk about these documents, please call Melania Magallanes RPH at phone: [phone], Monday-Friday 8-4:30pm.    I look forward to working with you and your doctors to make sure your medications work well for you.    Sincerely,    Melania Magallanes RPH

## 2022-02-08 NOTE — LETTER
_  Medication List        Prepared on: 2/8/2022     Bring your Medication List when you go to the doctor, hospital, or   emergency room. And, share it with your family or caregivers.     Note any changes to how you take your medications.  Cross out medications when you no longer use them.    Medication How I take it Why I use it Prescriber   Calcium-Magnesium-Zinc 333-133-5 MG TABS per tablet Take 1 tablet by mouth daily  [facility] Lynn Gillespie MD   coenzyme Q10 100 mg capsule Take 100 mg by mouth daily  [facility] Lynn Gillespie MD   Lutein 10 MG TABS Take 10 mg by mouth daily  [facility] Lynn Gillespie MD   Fish Oil 1,200 MG capsule Take 1 g by mouth daily  [facility] Lynn Gillespie MD   PARoxetine (PAXIL) 10 MG tablet Take 10 mg by mouth At Bedtime  Major Depressive Disorder Lynn Gillespie MD   pravastatin (PRAVACHOL) 10 MG tablet Take 10 mg by mouth At Bedtime  High Cholesterol Lynn Gillespie MD         Add new medications, over-the-counter drugs, herbals, vitamins, or  minerals in the blank rows below.    Medication How I take it Why I use it Prescriber                          Allergies:      codeine; penicillins; propoxyphene n-acetaminophen [propoxyphene n-apap]; sulfa (sulfonamide antibiotics) [sulfa drugs]        Side effects I have had:              Other Information:             My notes and questions:

## 2022-02-08 NOTE — Clinical Note
_  Medication List        Prepared on: 2/8/2022     Bring your Medication List when you go to the doctor, hospital, or   emergency room. And, share it with your family or caregivers.     Note any changes to how you take your medications.  Cross out medications when you no longer use them.    Medication How I take it Why I use it Prescriber   Calcium-Magnesium-Zinc 333-133-5 MG TABS per tablet Take 1 tablet by mouth daily  Patient Reported   coenzyme Q10 100 mg capsule [COENZYME Q10 100 MG CAPSULE] Take 100 mg by mouth daily.  Historical Provider   Lutein 10 MG TABS Take 10 mg by mouth daily  Patient Reported   omega-3/dha/epa/fish oil (FISH OIL-OMEGA-3 FATTY ACIDS) 300-1,000 mg capsule [OMEGA-3/DHA/EPA/FISH OIL (FISH OIL-OMEGA-3 FATTY ACIDS) 300-1,000 MG CAPSULE] Take 1 g by mouth daily.  Historical Provider   PARoxetine (PAXIL) 10 MG tablet Take 20 mg by mouth At Bedtime  Patient Reported   pravastatin (PRAVACHOL) 10 MG tablet Take 10 mg by mouth At Bedtime   Historical Provider         Add new medications, over-the-counter drugs, herbals, vitamins, or  minerals in the blank rows below.    Medication How I take it Why I use it Prescriber                          Allergies:      codeine; penicillins; propoxyphene n-acetaminophen [propoxyphene n-apap]; sulfa (sulfonamide antibiotics) [sulfa drugs]        Side effects I have had:              Other Information:             My notes and questions:

## 2022-02-08 NOTE — PROGRESS NOTES
Medication Therapy Management (MTM) Encounter    ASSESSMENT:                            Medication Adherence/Access: No issues identified    Hot flashes: Improved since reducing paroxetine.  This was most likely a side effect of the paroxetine vs post-menopausal hot flashes or thyroid issues (TSH has been WNL).     Depression: We had an internal error and her new medication was not sent into the pharmacy.  She is doing really well just on the lower dose of paroxetine 10 mg.  Since depression is doing well right now, would be beneficial to continue the current paroxetine.  Her side effects of hot flashed have improved as well.  If her depression is worsening, she can call to schedule a follow up with Dr. Gillespie.  At that time we could consider switching to alternative medication like desvenlafaxine.     Hyperlipidemia: Stable.  Patient is on low intensity statin which is indicated based on 2019 ACC/AHA guidelines for lipid management.  we could consider trial off pravastatin in the future, to see if her      Supplements/OTCs:  Stable.      PLAN:                            1.  Continue current medications.  Reviewed the results of the Oneome test with Cortney again.    Follow-up: As needed.  3 months with Dr. Gillespie, or sooner if depression is worsening.    SUBJECTIVE/OBJECTIVE:                          Cortney Nagy is a 71 year old female coming in for a follow-up visit.  Today's visit is a follow-up MTM visit from 1/5.     Reason for visit: Follow up on depression, and transition from paroxetine to desvenelafaxine.  Today is our comprehensive review for 2022.    Allergies/ADRs: Reviewed in chart  Past Medical History: Reviewed in chart  Tobacco: She reports that she has never smoked. She has never used smokeless tobacco.  Alcohol: rare     Medication Adherence/Access: no issues reported  Tylenol and naproxen were on her med list, but she hasn't taken these for years - would like them removed.    Note:  She  never started the pristiq, never had got sent to the pharmacy.  Looked back in chart, I had sent the TE to Dr. Gillespie, and it was closed before med was sent to the pharmacy.     Hot flashes: Since reducing the paroxetine dose the hot flashes have been significantly reduced in frequency, duration and severity.  It hasn't been waking up up nightly now.  Has been sleeping much better   Her last period was 55.  She is frustrated that this is still happening.  She doesn't recall if the hot flashes were worse earlier this summer when she was on a higher dose of paroxetine 40 mg.      Depression:  Current medications include: Paroxetine 10 mg at bedtime.     Patient said her depression and mood has been doing very well since our phone appointment on 1/5.    The prescription for desvenlafaxine never got sent to the pharmacy, so she didn't start it but she did reduce her dose of paroxetine.  She noticed significant improvement in her hot-flash symptoms and hasn't been waking up at night.  She doesn't have the brain fog, memory issues that she was while on 20 mg of paroxetine.  She is feeling very good on the current dose of paroxetine.  She saw Dr. Gillespie prior to me and the plan is to continue the current medication.    We reviewed the results from the Oneome test again.  We had reviewed this over the phone, but she had several questions after getting the test in the mail.    Past medications:  Fluoxetine - sexual side effects, was effective and helped with her anxiety/depression.  It worked very well.  Citalopram- sexual side effects  Welbutrin- unsure what happened with this  Serzone- sedated.     Sertraline - sexual side effects, decreased libido  Effexor ER 75 mg - some sexual side effects.  Doesn't recall GI side effects, but thinks it wasn't working as well for her depression     PHQ 10/13/2021   PHQ-9 Total Score 0   Q9: Thoughts of better off dead/self-harm past 2 weeks Not at all      Hyperlipidemia: Current  "therapy includes Pravastatin 10 mg daily.  Patient reports no significant myalgias or other side effects.  Patient is wondering if the pravastatin is affecting her memory.  The 10-year ASCVD risk score (Christa LOMAX Jr., et al., 2013) is: 6.3%    Values used to calculate the score:      Age: 71 years      Sex: Female      Is Non- : No      Diabetic: No      Tobacco smoker: No      Systolic Blood Pressure: 100 mmHg      Is BP treated: No      HDL Cholesterol: 82 mg/dL      Total Cholesterol: 201 mg/dL          Recent Labs   Lab Test 10/13/21  1107 09/16/20  1008   CHOL 201* 228*   HDL 82 81    132*   TRIG 54 75         Supplements/OTCs: Taking Coenzyme Q-10 100 mg daily, Fish Oil 1000 mg daily, Calcium-magnesium-zinc once daily.  Denies issues.      Today's Vitals: /72   Pulse 70   Ht 5' 6.75\" (1.695 m)   Wt 143 lb (64.9 kg)   BMI 22.57 kg/m    ----------------      I spent 60 minutes with this patient today (an extra 15 minutes was spent creating the Medication Action Plan). I offer these suggestions for consideration by Dr. Gillespie. A copy of the visit note was provided to the patient's provider(s).    The patient was mailed a summary of these recommendations.     Melania Magallanes, [place]  Medication Therapy Management Pharmacist  Pager: [phone]     Medication Therapy Recommendations  No medication therapy recommendations to display       "

## 2022-02-08 NOTE — LETTER
"Recommended To-Do List      Prepared on: 2/8/2022     You can get the best results from your medications by completing the items on this \"To-Do List.\"      Bring your To-Do List when you go to your doctor. And, share it with your family or caregivers.    My To-Do List:  What we talked about: What I should do:   What my medicines are for, how to know if my medicines are working, made sure my medicines are safe for me and reviewed how to take my medicines.     Take my medicines every day    Please call to schedule an appointment with Dr. Gillespie sooner than 3 months if you feel like your mood is worsening.    You don't need to schedule a follow up appointment with me unless you have more concerns or questions about the Oneome genetic test, or if you need help with your medications we can always meet again!                  "

## 2022-02-09 LAB — DEPRECATED CALCIDIOL+CALCIFEROL SERPL-MC: 26 UG/L (ref 30–80)

## 2022-11-02 ENCOUNTER — LAB REQUISITION (OUTPATIENT)
Dept: LAB | Facility: CLINIC | Age: 72
End: 2022-11-02

## 2022-11-02 DIAGNOSIS — E78.2 MIXED HYPERLIPIDEMIA: ICD-10-CM

## 2022-11-02 DIAGNOSIS — F33.42 MAJOR DEPRESSIVE DISORDER, RECURRENT, IN FULL REMISSION (H): ICD-10-CM

## 2022-11-02 LAB
ALBUMIN SERPL BCG-MCNC: 4.3 G/DL (ref 3.5–5.2)
ALP SERPL-CCNC: 44 U/L (ref 35–104)
ALT SERPL W P-5'-P-CCNC: 13 U/L (ref 10–35)
ANION GAP SERPL CALCULATED.3IONS-SCNC: 11 MMOL/L (ref 7–15)
AST SERPL W P-5'-P-CCNC: 19 U/L (ref 10–35)
BILIRUB SERPL-MCNC: 0.6 MG/DL
BUN SERPL-MCNC: 16.3 MG/DL (ref 8–23)
CALCIUM SERPL-MCNC: 9.8 MG/DL (ref 8.8–10.2)
CHLORIDE SERPL-SCNC: 105 MMOL/L (ref 98–107)
CHOLEST SERPL-MCNC: 190 MG/DL
CREAT SERPL-MCNC: 0.75 MG/DL (ref 0.51–0.95)
DEPRECATED HCO3 PLAS-SCNC: 26 MMOL/L (ref 22–29)
GFR SERPL CREATININE-BSD FRML MDRD: 84 ML/MIN/1.73M2
GLUCOSE SERPL-MCNC: 91 MG/DL (ref 70–99)
HDLC SERPL-MCNC: 68 MG/DL
LDLC SERPL CALC-MCNC: 110 MG/DL
NONHDLC SERPL-MCNC: 122 MG/DL
POTASSIUM SERPL-SCNC: 4.3 MMOL/L (ref 3.4–5.3)
PROT SERPL-MCNC: 6.2 G/DL (ref 6.4–8.3)
SODIUM SERPL-SCNC: 142 MMOL/L (ref 136–145)
TRIGL SERPL-MCNC: 59 MG/DL
TSH SERPL DL<=0.005 MIU/L-ACNC: 0.97 UIU/ML (ref 0.3–4.2)

## 2022-11-02 PROCEDURE — 80053 COMPREHEN METABOLIC PANEL: CPT | Performed by: PHYSICIAN ASSISTANT

## 2022-11-02 PROCEDURE — 80061 LIPID PANEL: CPT | Performed by: PHYSICIAN ASSISTANT

## 2022-11-02 PROCEDURE — 84443 ASSAY THYROID STIM HORMONE: CPT | Performed by: PHYSICIAN ASSISTANT

## 2022-11-09 ENCOUNTER — TRANSFERRED RECORDS (OUTPATIENT)
Dept: HEALTH INFORMATION MANAGEMENT | Facility: CLINIC | Age: 72
End: 2022-11-09

## 2022-12-05 ENCOUNTER — LAB REQUISITION (OUTPATIENT)
Dept: LAB | Facility: CLINIC | Age: 72
End: 2022-12-05

## 2022-12-05 ENCOUNTER — TRANSFERRED RECORDS (OUTPATIENT)
Dept: HEALTH INFORMATION MANAGEMENT | Facility: CLINIC | Age: 72
End: 2022-12-05

## 2022-12-05 DIAGNOSIS — N89.8 OTHER SPECIFIED NONINFLAMMATORY DISORDERS OF VAGINA: ICD-10-CM

## 2022-12-05 LAB
NUGENT SCORE: ABNORMAL
WHITE BLOOD CELLS: ABNORMAL

## 2022-12-05 PROCEDURE — 87205 SMEAR GRAM STAIN: CPT | Performed by: FAMILY MEDICINE

## 2022-12-26 ENCOUNTER — HEALTH MAINTENANCE LETTER (OUTPATIENT)
Age: 72
End: 2022-12-26

## 2023-04-16 ENCOUNTER — HEALTH MAINTENANCE LETTER (OUTPATIENT)
Age: 73
End: 2023-04-16

## 2023-06-05 ENCOUNTER — LAB REQUISITION (OUTPATIENT)
Dept: LAB | Facility: CLINIC | Age: 73
End: 2023-06-05

## 2023-06-05 DIAGNOSIS — E55.9 VITAMIN D DEFICIENCY, UNSPECIFIED: ICD-10-CM

## 2023-06-05 PROCEDURE — 82306 VITAMIN D 25 HYDROXY: CPT | Performed by: FAMILY MEDICINE

## 2023-06-06 LAB — DEPRECATED CALCIDIOL+CALCIFEROL SERPL-MC: 44 UG/L (ref 20–75)

## 2023-08-02 LAB — PHQ9 SCORE: 7

## 2023-08-15 ENCOUNTER — TRANSFERRED RECORDS (OUTPATIENT)
Dept: HEALTH INFORMATION MANAGEMENT | Facility: CLINIC | Age: 73
End: 2023-08-15
Payer: COMMERCIAL

## 2023-08-16 ENCOUNTER — TRANSFERRED RECORDS (OUTPATIENT)
Dept: HEALTH INFORMATION MANAGEMENT | Facility: CLINIC | Age: 73
End: 2023-08-16
Payer: COMMERCIAL

## 2023-08-18 ENCOUNTER — MEDICAL CORRESPONDENCE (OUTPATIENT)
Dept: HEALTH INFORMATION MANAGEMENT | Facility: CLINIC | Age: 73
End: 2023-08-18
Payer: COMMERCIAL

## 2023-08-21 DIAGNOSIS — R05.3 CHRONIC COUGH: Primary | ICD-10-CM

## 2023-08-22 ENCOUNTER — ANCILLARY ORDERS (OUTPATIENT)
Dept: RADIOLOGY | Facility: CLINIC | Age: 73
End: 2023-08-22

## 2023-08-23 ENCOUNTER — OFFICE VISIT (OUTPATIENT)
Dept: PULMONOLOGY | Facility: CLINIC | Age: 73
End: 2023-08-23
Payer: COMMERCIAL

## 2023-08-23 DIAGNOSIS — R05.3 CHRONIC COUGH: ICD-10-CM

## 2023-08-23 LAB
DLCOCOR-%PRED-PRE: 101 %
DLCOCOR-PRE: 20.07 ML/MIN/MMHG
DLCOUNC-%PRED-PRE: 96 %
DLCOUNC-PRE: 19.15 ML/MIN/MMHG
DLCOUNC-PRED: 19.85 ML/MIN/MMHG
ERV-%PRED-PRE: 171 %
ERV-PRE: 1.32 L
ERV-PRED: 0.77 L
EXPTIME-PRE: 7.67 SEC
FEF2575-%PRED-POST: 80 %
FEF2575-%PRED-PRE: 81 %
FEF2575-POST: 1.41 L/SEC
FEF2575-PRE: 1.44 L/SEC
FEF2575-PRED: 1.76 L/SEC
FEFMAX-%PRED-PRE: 105 %
FEFMAX-PRE: 6.09 L/SEC
FEFMAX-PRED: 5.76 L/SEC
FEV1-%PRED-PRE: 105 %
FEV1-PRE: 2.24 L
FEV1FEV6-PRE: 72 %
FEV1FEV6-PRED: 79 %
FEV1FVC-PRE: 71 %
FEV1FVC-PRED: 78 %
FEV1SVC-PRE: 73 %
FEV1SVC-PRED: 69 %
FIFMAX-PRE: 4.61 L/SEC
FRCPLETH-%PRED-PRE: 119 %
FRCPLETH-PRE: 3.67 L
FRCPLETH-PRED: 3.07 L
FVC-%PRED-PRE: 114 %
FVC-PRE: 3.13 L
FVC-PRED: 2.74 L
HGB BLD-MCNC: 12 G/DL
IC-%PRED-PRE: 75 %
IC-PRE: 1.73 L
IC-PRED: 2.28 L
RVPLETH-%PRED-PRE: 107 %
RVPLETH-PRE: 2.35 L
RVPLETH-PRED: 2.18 L
TLCPLETH-%PRED-PRE: 101 %
TLCPLETH-PRE: 5.4 L
TLCPLETH-PRED: 5.34 L
VA-%PRED-PRE: 88 %
VA-PRE: 4.32 L
VC-%PRED-PRE: 98 %
VC-PRE: 3.05 L
VC-PRED: 3.09 L

## 2023-08-23 PROCEDURE — 94060 EVALUATION OF WHEEZING: CPT | Performed by: INTERNAL MEDICINE

## 2023-08-23 PROCEDURE — 85018 HEMOGLOBIN: CPT | Performed by: INTERNAL MEDICINE

## 2023-08-23 PROCEDURE — 94729 DIFFUSING CAPACITY: CPT | Performed by: INTERNAL MEDICINE

## 2023-08-23 PROCEDURE — 94726 PLETHYSMOGRAPHY LUNG VOLUMES: CPT | Performed by: INTERNAL MEDICINE

## 2023-08-24 ENCOUNTER — OFFICE VISIT (OUTPATIENT)
Dept: PULMONOLOGY | Facility: CLINIC | Age: 73
End: 2023-08-24
Payer: COMMERCIAL

## 2023-08-24 VITALS
HEART RATE: 72 BPM | SYSTOLIC BLOOD PRESSURE: 106 MMHG | WEIGHT: 123 LBS | DIASTOLIC BLOOD PRESSURE: 68 MMHG | HEIGHT: 67 IN | BODY MASS INDEX: 19.3 KG/M2 | OXYGEN SATURATION: 98 %

## 2023-08-24 DIAGNOSIS — R05.3 CHRONIC COUGH: Primary | ICD-10-CM

## 2023-08-24 PROCEDURE — 99204 OFFICE O/P NEW MOD 45 MIN: CPT | Performed by: NURSE PRACTITIONER

## 2023-08-24 RX ORDER — OMEPRAZOLE 40 MG/1
40 CAPSULE, DELAYED RELEASE ORAL DAILY
Qty: 30 CAPSULE | Refills: 1 | Status: SHIPPED | OUTPATIENT
Start: 2023-08-24 | End: 2023-08-30 | Stop reason: DRUGHIGH

## 2023-08-24 NOTE — PROGRESS NOTES
Pulmonary Outpatient Consult Note  August 24, 2023    Assessment and Plan:   Cortney Nagy is a 73 year old female never smoker with history of depression, HLD, arthritis, seasonal allergies, and eczema presenting today for evaluation of chronic cough.   She has had this cough for many years. It is mostly a dry cough accompanied by frequent throat clearing. Denies any reflux or difficulty swallowing. She denies any other breathing symptoms or complaints.   Her PFTs show normal spirometry, no significant bronchodilator response, and a normal diffusing capacity.   Recent chest CT reported to have some scarring in bilateral lung bases. This appears to be very minimal and would not explain her cough.   SpO2 and lung exam is normal today.     #. Chronic cough -- a trial of a PPI and Flonase have both been suggested to her but she has not tried them yet. I think it would be appropriate to do these suggested trials as her PFTs are normal.   START omeprazole 40mg daily until follow up.  If this is not helpful we can trial Flonase BID. She is hesitant to use medications so prefers to introduce one thing at a time.   Consider trial of ICS if Flonase and PPI are not helpful.  History of seasonal allergies and eczema could support allergic type intermittent asthma.  #. Abnormal CT scan -- Images received from [facility]. There does not appear to be significant scarring that would explain her symptoms. No need for further imaging at this time.   #. ? Silent reflux -- as above, may be contributing to her cough.   #. HCM -- UTD with COVID vaccine and booster. Has had PCV 13 and PPSV 23.   Recommend seasonal flu vaccine     RTC 6 weeks     Betina Sandy, CNP  Pulmonary Medicine  ______________________________________________________________________________    CC:   Chief Complaint   Patient presents with    Consult     Cough and scaring of lungs      HPI:   Cortney presents for evaluation of cough and to go over the recent CT scan.    The cough started years ago (>5 years) she is unsure of the timeline exactly. The cough is dry. She also reports frequent throat clearing, mostly in the morning. The cough frequency and severity seems to wax and wane in severity.   Denies wheeze, chest tightness, chest pain, or hemoptysis.   Denies nasal congestion or PND. She saw ENT in [place] many years ago and they did mention there was some drainage in the back of her throat but she did not notice it at that time.   She does admit to some seasonal allergy symptoms occasionally but nothing consistent or very obvious.   Denies heartburn. She does admit to eating chocolate often at bedtime.     Her  passed away in 2021 from adenocarcinoma following a chronic cough. She has a lot of anxiety around her cough with no obvious explanation because of this.      FH- Daughter has asthma. Mother had chronic cough and frequent throat clearing.     PMH:  There are no problems to display for this patient.      PSH:  Past Surgical History:   Procedure Laterality Date    EYE SURGERY      RETINA SURGERY       Current Meds:  Current Outpatient Medications   Medication Sig Dispense Refill    Calcium-Magnesium-Zinc 333-133-5 MG TABS per tablet Take 1 tablet by mouth daily       coenzyme Q10 100 mg capsule Take 100 mg by mouth daily       Lutein 10 MG TABS Take 10 mg by mouth daily       Omega-3 Fatty Acids (FISH OIL) 1200 MG capsule Take 1,200 mg by mouth daily      PARoxetine (PAXIL) 10 MG tablet Take 20 mg by mouth At Bedtime      pravastatin (PRAVACHOL) 10 MG tablet Take 10 mg by mouth At Bedtime            Allergies:  Allergies   Allergen Reactions    Codeine Other (See Comments)     Hallucinations.    Penicillins Hives    Propoxyphene N-Acetaminophen [Propoxyphene N-Apap] Unknown    Sulfa (Sulfonamide Antibiotics) [Sulfa Antibiotics] Dizziness       Social Hx:  Marital status: lives alone,    Number of children: 3, 6 grandchildren   Resides in a house built in  "[place], ? concern for mold. Occasionally has flooding in her basement in the spring.  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    Smoking history: never smoker  Alcohol use: will occasionally have a glass of wine   Recreational drug use: none   Recent Travel: [place]     Family HX: family history includes Cancer in her brother and father; Diabetes in her father; Heart Disease in her father and mother.    ROS:   ROS: 10-point review performed and notable for the above mentioned symptoms. The remainder reviewed and negative.     Physical Exam:  /68 (BP Location: Left arm, Patient Position: Chair, Cuff Size: Adult Regular)   Pulse 72   Ht 1.702 m (5' 7\")   Wt 55.8 kg (123 lb)   SpO2 98%   BMI 19.26 kg/m      Physical Exam  Constitutional:       General: She is not in acute distress.     Appearance: She is not ill-appearing.   HENT:      Nose: Nose normal.   Cardiovascular:      Rate and Rhythm: Normal rate and regular rhythm.      Pulses: Normal pulses.      Heart sounds: Normal heart sounds.   Pulmonary:      Effort: Pulmonary effort is normal. No respiratory distress.      Breath sounds: No stridor. No wheezing or rhonchi.   Musculoskeletal:      Right lower leg: No edema.      Left lower leg: No edema.   Skin:     General: Skin is warm and dry.      Findings: No rash.   Neurological:      Mental Status: She is alert.   Psychiatric:         Behavior: Behavior normal.       PFT's     8/23/2023      Impression:  Full Pulmonary Function Test is normal.  PFTs are consistent with  no  obstructive disease.  Spirometry is not consistent with reversibility.  There is no hyperinflation.  There is no air-trapping.  Diffusion capacity when corrected for hemoglobin is not reduced.    Labs:   personally reviewed in the EMR.    Imaging studies: personally reviewed and interpreted. Below are the Radiology " interpretations.    CT from [facility] 8/16/2023  Findings:  The heart size is normal without pericardial effusion.  Thoracic lymph nodes are not enlarged.  Biapical pleural thickening is present.  There is no pleural effusion or pneumothorax.  The airways are patent.  Mild scarring in the superior segments of the lower lobes noted bilaterally.  Lungs are without consolidation, interstitial disease, or suspicious nodules.  Impression:  Mild scarring in the superior segments of the lower lobes.  No acute thoracic findings.

## 2023-08-24 NOTE — PATIENT INSTRUCTIONS
- Let's have you trial a medication for reflux. This cough may be caused by silent reflux. Be sure to take this medication every day no matter what until we meet again.   - if this is helpful you should continue the medication.     - your CT scan looks OK. I do not see anything that would be causing your cough.     If you have worsening symptoms, questions, or need to speak with the nurse please call [phone].

## 2023-08-29 ENCOUNTER — ANCILLARY ORDERS (OUTPATIENT)
Dept: RADIOLOGY | Facility: CLINIC | Age: 73
End: 2023-08-29

## 2023-08-30 ENCOUNTER — TELEPHONE (OUTPATIENT)
Dept: PULMONOLOGY | Facility: CLINIC | Age: 73
End: 2023-08-30
Payer: COMMERCIAL

## 2023-08-30 DIAGNOSIS — R05.3 CHRONIC COUGH: Primary | ICD-10-CM

## 2023-08-30 NOTE — TELEPHONE ENCOUNTER
Per Betina Sandy CNP,  That's fine. Can you send the order for 20mg and let her know?     Called Cortney and let her know about new order. She will pick it up tomorrow.

## 2023-08-30 NOTE — TELEPHONE ENCOUNTER
Cortney called today to request the omeprazole be decreased down to 20 mg instead of 40 mg. She feels it is making her very tired during the day and she has been taking 2-3 naps a day. Will send to Betina Sandy CNP to review.

## 2023-09-09 DIAGNOSIS — R05.3 CHRONIC COUGH: ICD-10-CM

## 2023-10-04 ENCOUNTER — OFFICE VISIT (OUTPATIENT)
Dept: PULMONOLOGY | Facility: CLINIC | Age: 73
End: 2023-10-04
Payer: COMMERCIAL

## 2023-10-04 VITALS
BODY MASS INDEX: 19.26 KG/M2 | OXYGEN SATURATION: 98 % | DIASTOLIC BLOOD PRESSURE: 62 MMHG | HEART RATE: 80 BPM | WEIGHT: 123 LBS | SYSTOLIC BLOOD PRESSURE: 112 MMHG

## 2023-10-04 DIAGNOSIS — R05.3 CHRONIC COUGH: Primary | ICD-10-CM

## 2023-10-04 DIAGNOSIS — R09.82 POST-NASAL DRIP: ICD-10-CM

## 2023-10-04 PROCEDURE — 90662 IIV NO PRSV INCREASED AG IM: CPT | Performed by: NURSE PRACTITIONER

## 2023-10-04 PROCEDURE — 99214 OFFICE O/P EST MOD 30 MIN: CPT | Mod: 25 | Performed by: NURSE PRACTITIONER

## 2023-10-04 PROCEDURE — G0008 ADMIN INFLUENZA VIRUS VAC: HCPCS | Performed by: NURSE PRACTITIONER

## 2023-10-04 RX ORDER — MULTIVIT-MIN/IRON/FOLIC ACID/K 18-600-40
4000 CAPSULE ORAL DAILY
COMMUNITY
End: 2024-05-01

## 2023-10-04 RX ORDER — CETIRIZINE HYDROCHLORIDE 10 MG/1
10 TABLET ORAL DAILY
Qty: 30 TABLET | Refills: 3 | COMMUNITY
Start: 2023-10-04 | End: 2023-12-04

## 2023-10-04 RX ORDER — FLUTICASONE PROPIONATE 50 MCG
1 SPRAY, SUSPENSION (ML) NASAL DAILY
Qty: 16 G | Refills: 4 | Status: SHIPPED | OUTPATIENT
Start: 2023-10-04 | End: 2023-10-18

## 2023-10-04 NOTE — PROGRESS NOTES
Pulmonary Outpatient Consult Note  October 4, 2023    Assessment and Plan:   Cortney Nagy is a 73 year old female never smoker with history of depression, HLD, arthritis, seasonal allergies, and eczema presenting today for evaluation of chronic cough.   She has had this cough for many years. It is mostly a dry cough accompanied by frequent throat clearing. Denies any reflux or difficulty swallowing. She denies any other breathing symptoms or complaints.   Her PFTs show normal spirometry, no significant bronchodilator response, and a normal diffusing capacity.   Recent chest CT reported to have some scarring in bilateral lung bases. This appears to be very minimal and would not explain her cough.   SpO2 and lung exam is normal today.     #. Chronic cough -- a trial of a PPI was not helpful. We will move on to a trial of Flonase with a OTC antihistamine to manage possible PND. Her PFTs are normal.   stop omeprazole   Trial Flonase BID and zyrtec QD.   Consider trial of ICS if Flonase and zyrtec are not helpful.  History of seasonal allergies and eczema could support allergic type intermittent asthma.  Consider referral to ENT to rule out vocal cord dysfunction.   #. Abnormal CT scan -- Images received from [facility]. There does not appear to be significant scarring that would explain her symptoms. No need for further imaging at this time.   #. HCM -- UTD with COVID vaccine and booster. Has had PCV 13 and PPSV 23.   given seasonal flu vaccine today    RTC 6 weeks     Betina Sandy, CNP  Pulmonary Medicine  ______________________________________________________________________________    CC:   Chief Complaint   Patient presents with    Follow Up     Cough      HPI:   Cortney presents for follow up. She was last seen in clinic in 08/2023. At that time we discussed the likelihood of her PND or GERD contributing to her chronic cough. She was hesitant to start multiple medications at the same time so we decided to have  her trial a PPI first. She took the omeprazole daily for 1 month and did not notice a change in cough.   She is willing to try the Flonase and antihistamine now and focus on the PND. She does report OTC Robitussin with a decongestant being helpful.     The cough started years ago (>5 years) she is unsure of the timeline exactly. The cough is dry. She also reports frequent throat clearing, mostly in the morning. The cough frequency and severity seems to wax and wane in severity.   Denies wheeze, chest tightness, chest pain, or hemoptysis.   Denies nasal congestion but will have throat congestion that she can cough up and spit out. She saw ENT in [place] many years ago and they did mention there was some drainage in the back of her throat.   She does admit to some seasonal allergy symptoms occasionally but nothing consistent or very obvious.   Denies heartburn. She does admit to eating chocolate often at bedtime.     Her  passed away in 2021 from adenocarcinoma following a chronic cough. She has a lot of anxiety around her cough with no obvious explanation because of this.      FH- Daughter has asthma. Mother had chronic cough and frequent throat clearing.     PMH:  There are no problems to display for this patient.    PSH:  Past Surgical History:   Procedure Laterality Date    EYE SURGERY      RETINA SURGERY     Current Meds:  Current Outpatient Medications   Medication Sig Dispense Refill    Calcium-Magnesium-Zinc 333-133-5 MG TABS per tablet Take 1 tablet by mouth daily       cetirizine (ZYRTEC) 10 MG tablet Take 1 tablet (10 mg) by mouth daily 30 tablet 3    coenzyme Q10 100 mg capsule Take 100 mg by mouth daily       fluticasone (FLONASE) 50 MCG/ACT nasal spray Spray 1 spray into both nostrils daily 16 g 4    Lutein 10 MG TABS Take 10 mg by mouth daily       Omega-3 Fatty Acids (FISH OIL) 1200 MG capsule Take 1,200 mg by mouth daily      PARoxetine (PAXIL) 10 MG tablet Take 20 mg by mouth At Bedtime       pravastatin (PRAVACHOL) 10 MG tablet Take 10 mg by mouth At Bedtime       Vitamin D, Cholecalciferol, 50 MCG (2000 UT) CAPS Take 4,000 capsules by mouth daily           Allergies:  Allergies   Allergen Reactions    Codeine Other (See Comments)     Hallucinations.    Penicillins Hives    Propoxyphene N-Acetaminophen [Propoxyphene N-Apap] Unknown    Sulfa (Sulfonamide Antibiotics) [Sulfa Antibiotics] Dizziness       Social Hx:  Marital status: lives alone,    Number of children: 3, 6 grandchildren   Resides in a house built in 1941, ? concern for mold. Occasionally has flooding in her basement in the spring.  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    Smoking history: never smoker  Alcohol use: will occasionally have a glass of wine   Recreational drug use: none   Recent Travel: [place]     Family HX: family history includes Cancer in her brother and father; Diabetes in her father; Heart Disease in her father and mother.    ROS:   ROS: 10-point review performed and notable for the above mentioned symptoms. The remainder reviewed and negative.     Physical Exam:  /62 (BP Location: Left arm, Patient Position: Chair, Cuff Size: Adult Regular)   Pulse 80   Wt 55.8 kg (123 lb)   SpO2 98%   BMI 19.26 kg/m      Physical Exam  Constitutional:       General: She is not in acute distress.     Appearance: She is not ill-appearing.   HENT:      Nose: Nose normal.   Cardiovascular:      Rate and Rhythm: Normal rate and regular rhythm.      Pulses: Normal pulses.      Heart sounds: Normal heart sounds.   Pulmonary:      Effort: Pulmonary effort is normal. No respiratory distress.      Breath sounds: No stridor. No wheezing or rhonchi.   Musculoskeletal:      Right lower leg: No edema.      Left lower leg: No edema.   Skin:     General: Skin is warm and dry.      Findings: No rash.   Neurological:      Mental Status:  She is alert.   Psychiatric:         Behavior: Behavior normal.       PFT's     8/23/2023      Impression:  Full Pulmonary Function Test is normal.  PFTs are consistent with  no  obstructive disease.  Spirometry is not consistent with reversibility.  There is no hyperinflation.  There is no air-trapping.  Diffusion capacity when corrected for hemoglobin is not reduced.    Labs:   personally reviewed in the EMR.    Imaging studies: personally reviewed and interpreted. Below are the Radiology interpretations.    CT from [facility] 8/16/2023  Findings:  The heart size is normal without pericardial effusion.  Thoracic lymph nodes are not enlarged.  Biapical pleural thickening is present.  There is no pleural effusion or pneumothorax.  The airways are patent.  Mild scarring in the superior segments of the lower lobes noted bilaterally.  Lungs are without consolidation, interstitial disease, or suspicious nodules.  Impression:  Mild scarring in the superior segments of the lower lobes.  No acute thoracic findings.

## 2023-10-04 NOTE — PATIENT INSTRUCTIONS
- Since the omeprazole was not helpful you can stop this.     - let's have you try a daily nasal spray and antihistamine to see if it clears your post nasal drip up.     If you have worsening symptoms, questions, or need to speak with the nurse please call [phone].

## 2023-10-18 DIAGNOSIS — R05.3 CHRONIC COUGH: ICD-10-CM

## 2023-10-18 DIAGNOSIS — R09.82 POST-NASAL DRIP: ICD-10-CM

## 2023-10-18 RX ORDER — FLUTICASONE PROPIONATE 50 MCG
1 SPRAY, SUSPENSION (ML) NASAL DAILY
Qty: 48 ML | Refills: 0 | Status: SHIPPED | OUTPATIENT
Start: 2023-10-18 | End: 2023-12-04

## 2023-11-02 ENCOUNTER — LAB REQUISITION (OUTPATIENT)
Dept: LAB | Facility: CLINIC | Age: 73
End: 2023-11-02

## 2023-11-02 DIAGNOSIS — Z13.1 ENCOUNTER FOR SCREENING FOR DIABETES MELLITUS: ICD-10-CM

## 2023-11-02 DIAGNOSIS — E78.2 MIXED HYPERLIPIDEMIA: ICD-10-CM

## 2023-11-02 LAB
CHOLEST SERPL-MCNC: 213 MG/DL
FASTING STATUS PATIENT QL REPORTED: YES
GLUCOSE SERPL-MCNC: 90 MG/DL (ref 70–99)
HDLC SERPL-MCNC: 84 MG/DL
LDLC SERPL CALC-MCNC: 117 MG/DL
NONHDLC SERPL-MCNC: 129 MG/DL
TRIGL SERPL-MCNC: 62 MG/DL

## 2023-11-02 PROCEDURE — 82947 ASSAY GLUCOSE BLOOD QUANT: CPT | Performed by: FAMILY MEDICINE

## 2023-11-02 PROCEDURE — 80061 LIPID PANEL: CPT | Performed by: FAMILY MEDICINE

## 2023-11-10 ENCOUNTER — TRANSFERRED RECORDS (OUTPATIENT)
Dept: HEALTH INFORMATION MANAGEMENT | Facility: CLINIC | Age: 73
End: 2023-11-10

## 2023-12-04 ENCOUNTER — VIRTUAL VISIT (OUTPATIENT)
Dept: PULMONOLOGY | Facility: CLINIC | Age: 73
End: 2023-12-04
Attending: NURSE PRACTITIONER
Payer: COMMERCIAL

## 2023-12-04 DIAGNOSIS — R05.3 CHRONIC COUGH: Primary | ICD-10-CM

## 2023-12-04 DIAGNOSIS — R09.82 POST-NASAL DRIP: ICD-10-CM

## 2023-12-04 PROCEDURE — 99214 OFFICE O/P EST MOD 30 MIN: CPT | Mod: 95 | Performed by: NURSE PRACTITIONER

## 2023-12-04 ASSESSMENT — PAIN SCALES - GENERAL: PAINLEVEL: NO PAIN (0)

## 2023-12-04 NOTE — NURSING NOTE
Is the patient currently in the state of [place]? YES    Visit mode:VIDEO    If the visit is dropped, the patient can be reconnected by: VIDEO VISIT: Text to cell phone:   Telephone Information:   Mobile [phone]       Will anyone else be joining the visit? NO  (If patient encounters technical issues they should call [phone])    How would you like to obtain your AVS? MyChart    Are changes needed to the allergy or medication list? No    Reason for visit: RECHECK (Follow up)    Mayito MUÑOZ

## 2023-12-04 NOTE — PATIENT INSTRUCTIONS
It was a pleasure seeing you in clinic today. This is what we discussed:    We will have you see a ENT specialist to see if there is some vocal cord dysfunction or sinus drainage causing your cough.   Continue the cough syrup as needed.   Use your albuterol every 4 hours as needed for cough, shortness of breath, wheeze, or chest tightness.   Follow up as needed   If you have worsening symptoms, questions, or need to speak with the nurse please call [phone].

## 2023-12-04 NOTE — PROGRESS NOTES
Virtual Visit Details    Type of service:  Telephone Visit. Patient had technical difficulties, switched to phone visit.   Video Start Time: 11:45 AM  Video End Time:12:06 PM    Originating Location (pt. Location): Home    Distant Location (provider location):  On-site  Platform used for Video Visit: [facility]      Pulmonary Outpatient Consult Note  December 4, 2023    Assessment and Plan:   Cortney Nagy is a 73 year old female never smoker with history of depression, HLD, arthritis, seasonal allergies, and eczema presenting today for evaluation of chronic cough.   She has had this cough for many years. It is mostly a dry cough accompanied by frequent throat clearing. Denies any reflux or difficulty swallowing. She denies any other breathing symptoms or complaints.   Her PFTs show normal spirometry, no significant bronchodilator response, and a normal diffusing capacity.   Recent chest CT reported to have some scarring in bilateral lung bases. This appears to be very minimal and would not explain her cough.   SpO2 and lung exam is normal today.     #. Chronic cough -- a trial of a PPI and Flonase were not helpful. Continues to have intermittent dry cough with throat clearing that is relieved by OTC cough suppressants. Her PFTs are normal, there does not appear to be an underlying pulmonary cause for her cough.   stop omeprazole   stop Flonase BID   Consider trial of ICS if cough worsens. History of seasonal allergies and eczema could support allergic type intermittent asthma.  I have placed a referral to ENT to rule out vocal cord dysfunction.   #. Abnormal CT scan -- Images received from [facility]. There does not appear to be significant scarring that would explain her symptoms. No need for further imaging at this time.   #. HCM -- UTD with COVID vaccine and booster. Has had PCV 13 and PPSV 23.   given seasonal flu vaccine today    RTC prn    Betina Sandy CNP  Pulmonary  Medicine  ______________________________________________________________________________    CC:   Chief Complaint   Patient presents with    RECHECK     Follow up     HPI:   Cortney presents for follow up via telephone visit. She was last seen in clinic in 10/2023.   Has been using Robatussin (dextromethorphan + antihistamine) with relief.   Cough is happening 2-3 times per week. Has frequent throat clearing throughout the day.   She took the omeprazole daily for 1 month and did not notice a change in cough.   She the tried Flonase and antihistamine daily with no relief.     The cough started years ago (>5 years) she is unsure of the timeline exactly. The cough is dry. She also reports frequent throat clearing, mostly in the morning. The cough frequency and severity seems to wax and wane in severity.   Denies wheeze, chest tightness, chest pain, or hemoptysis.   Denies nasal congestion but will have throat congestion that she can cough up and spit out. She saw ENT in [place] many years ago and they did mention there was some drainage in the back of her throat.   She does admit to some seasonal allergy symptoms occasionally but nothing consistent or very obvious.   Denies heartburn. She does admit to eating chocolate often at bedtime.     Her  passed away in 2021 from adenocarcinoma following a chronic cough. She has a lot of anxiety around her cough with no obvious explanation because of this.      FH- Daughter has asthma. Mother had chronic cough and frequent throat clearing.     PMH:  There are no problems to display for this patient.    PSH:  Past Surgical History:   Procedure Laterality Date    EYE SURGERY      RETINA SURGERY     Current Meds:  Current Outpatient Medications   Medication Sig Dispense Refill    Calcium-Magnesium-Zinc 333-133-5 MG TABS per tablet Take 1 tablet by mouth daily       cetirizine (ZYRTEC) 10 MG tablet Take 1 tablet (10 mg) by mouth daily 30 tablet 3    coenzyme Q10 100 mg capsule  Take 100 mg by mouth daily       fluticasone (FLONASE) 50 MCG/ACT nasal spray INSTILL 1 SPRAY INTO BOTH NOSTRILS DAILY 48 mL 0    Lutein 10 MG TABS Take 10 mg by mouth daily       Omega-3 Fatty Acids (FISH OIL) 1200 MG capsule Take 1,200 mg by mouth daily      PARoxetine (PAXIL) 10 MG tablet Take 20 mg by mouth At Bedtime      pravastatin (PRAVACHOL) 10 MG tablet Take 10 mg by mouth At Bedtime       Vitamin D, Cholecalciferol, 50 MCG (2000 UT) CAPS Take 4,000 capsules by mouth daily           Allergies:  Allergies   Allergen Reactions    Codeine Other (See Comments)     Hallucinations.    Penicillins Hives    Propoxyphene N-Acetaminophen [Propoxyphene N-Apap] Unknown    Sulfa (Sulfonamide Antibiotics) [Sulfa Antibiotics] Dizziness       Social Hx:  Marital status: lives alone,    Number of children: 3, 6 grandchildren   Resides in a house built in 1941, ? concern for mold. Occasionally has flooding in her basement in the spring.  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    Smoking history: never smoker  Alcohol use: will occasionally have a glass of wine   Recreational drug use: none   Recent Travel: [place]     Family HX: family history includes Cancer in her brother and father; Diabetes in her father; Heart Disease in her father and mother.    ROS:   ROS: 10-point review performed and notable for the above mentioned symptoms. The remainder reviewed and negative.     Physical Exam:  There were no vitals taken for this visit.    Physical Exam  Constitutional:       General: She is not in acute distress.     Appearance: She is not ill-appearing.   HENT:      Nose: Nose normal.   Cardiovascular:      Rate and Rhythm: Normal rate and regular rhythm.      Pulses: Normal pulses.      Heart sounds: Normal heart sounds.   Pulmonary:      Effort: Pulmonary effort is normal. No respiratory distress.      Breath sounds:  No stridor. No wheezing or rhonchi.   Musculoskeletal:      Right lower leg: No edema.      Left lower leg: No edema.   Skin:     General: Skin is warm and dry.      Findings: No rash.   Neurological:      Mental Status: She is alert.   Psychiatric:         Behavior: Behavior normal.       PFT's     8/23/2023      Impression:  Full Pulmonary Function Test is normal.  PFTs are consistent with  no  obstructive disease.  Spirometry is not consistent with reversibility.  There is no hyperinflation.  There is no air-trapping.  Diffusion capacity when corrected for hemoglobin is not reduced.    Labs:   personally reviewed in the EMR.    Imaging studies: personally reviewed and interpreted. Below are the Radiology interpretations.    CT from [facility] 8/16/2023  Findings:  The heart size is normal without pericardial effusion.  Thoracic lymph nodes are not enlarged.  Biapical pleural thickening is present.  There is no pleural effusion or pneumothorax.  The airways are patent.  Mild scarring in the superior segments of the lower lobes noted bilaterally.  Lungs are without consolidation, interstitial disease, or suspicious nodules.  Impression:  Mild scarring in the superior segments of the lower lobes.  No acute thoracic findings.

## 2023-12-27 ENCOUNTER — LAB REQUISITION (OUTPATIENT)
Dept: LAB | Facility: CLINIC | Age: 73
End: 2023-12-27

## 2023-12-27 DIAGNOSIS — J02.9 ACUTE PHARYNGITIS, UNSPECIFIED: ICD-10-CM

## 2023-12-27 PROCEDURE — 87081 CULTURE SCREEN ONLY: CPT | Performed by: FAMILY MEDICINE

## 2023-12-29 LAB — BACTERIA SPEC CULT: NORMAL

## 2024-02-04 ENCOUNTER — HEALTH MAINTENANCE LETTER (OUTPATIENT)
Age: 74
End: 2024-02-04

## 2024-02-19 ENCOUNTER — LAB REQUISITION (OUTPATIENT)
Dept: LAB | Facility: CLINIC | Age: 74
End: 2024-02-19

## 2024-02-19 DIAGNOSIS — N89.8 OTHER SPECIFIED NONINFLAMMATORY DISORDERS OF VAGINA: ICD-10-CM

## 2024-02-19 LAB
BACTERIAL VAGINOSIS VAG-IMP: NEGATIVE
CANDIDA DNA VAG QL NAA+PROBE: NOT DETECTED
CANDIDA GLABRATA / CANDIDA KRUSEI DNA: NOT DETECTED
T VAGINALIS DNA VAG QL NAA+PROBE: NOT DETECTED

## 2024-02-19 PROCEDURE — 0352U MULTIPLEX VAGINAL PANEL BY PCR: CPT | Performed by: PHYSICIAN ASSISTANT

## 2024-02-21 ENCOUNTER — TRANSFERRED RECORDS (OUTPATIENT)
Dept: HEALTH INFORMATION MANAGEMENT | Facility: CLINIC | Age: 74
End: 2024-02-21

## 2024-02-23 ENCOUNTER — TRANSFERRED RECORDS (OUTPATIENT)
Dept: HEALTH INFORMATION MANAGEMENT | Facility: CLINIC | Age: 74
End: 2024-02-23

## 2024-02-27 ENCOUNTER — LAB REQUISITION (OUTPATIENT)
Dept: LAB | Facility: CLINIC | Age: 74
End: 2024-02-27

## 2024-02-27 ENCOUNTER — ANESTHESIA EVENT (OUTPATIENT)
Dept: SURGERY | Facility: AMBULATORY SURGERY CENTER | Age: 74
End: 2024-02-27
Payer: COMMERCIAL

## 2024-02-27 DIAGNOSIS — E78.2 MIXED HYPERLIPIDEMIA: ICD-10-CM

## 2024-02-27 PROCEDURE — 80048 BASIC METABOLIC PNL TOTAL CA: CPT | Performed by: PHYSICIAN ASSISTANT

## 2024-02-28 LAB
ANION GAP SERPL CALCULATED.3IONS-SCNC: 12 MMOL/L (ref 7–15)
BUN SERPL-MCNC: 15.4 MG/DL (ref 8–23)
CALCIUM SERPL-MCNC: 9.5 MG/DL (ref 8.8–10.2)
CHLORIDE SERPL-SCNC: 101 MMOL/L (ref 98–107)
CREAT SERPL-MCNC: 0.75 MG/DL (ref 0.51–0.95)
DEPRECATED HCO3 PLAS-SCNC: 26 MMOL/L (ref 22–29)
EGFRCR SERPLBLD CKD-EPI 2021: 84 ML/MIN/1.73M2
GLUCOSE SERPL-MCNC: 89 MG/DL (ref 70–99)
POTASSIUM SERPL-SCNC: 4.3 MMOL/L (ref 3.4–5.3)
SODIUM SERPL-SCNC: 139 MMOL/L (ref 135–145)

## 2024-02-29 ENCOUNTER — ANESTHESIA (OUTPATIENT)
Dept: SURGERY | Facility: AMBULATORY SURGERY CENTER | Age: 74
End: 2024-02-29
Payer: COMMERCIAL

## 2024-02-29 ENCOUNTER — HOSPITAL ENCOUNTER (OUTPATIENT)
Facility: AMBULATORY SURGERY CENTER | Age: 74
Discharge: HOME | End: 2024-02-29
Attending: STUDENT IN AN ORGANIZED HEALTH CARE EDUCATION/TRAINING PROGRAM
Payer: COMMERCIAL

## 2024-02-29 VITALS
DIASTOLIC BLOOD PRESSURE: 52 MMHG | HEART RATE: 80 BPM | RESPIRATION RATE: 16 BRPM | BODY MASS INDEX: 19.56 KG/M2 | HEIGHT: 67 IN | SYSTOLIC BLOOD PRESSURE: 94 MMHG | TEMPERATURE: 96.9 F | OXYGEN SATURATION: 99 %

## 2024-02-29 DIAGNOSIS — N95.0 PMB (POSTMENOPAUSAL BLEEDING): ICD-10-CM

## 2024-02-29 RX ORDER — OXYCODONE HYDROCHLORIDE 10 MG/1
10 TABLET ORAL
Status: DISCONTINUED | OUTPATIENT
Start: 2024-02-29 | End: 2024-03-01 | Stop reason: HOSPADM

## 2024-02-29 RX ORDER — ONDANSETRON 4 MG/1
4 TABLET, ORALLY DISINTEGRATING ORAL EVERY 30 MIN PRN
Status: DISCONTINUED | OUTPATIENT
Start: 2024-02-29 | End: 2024-03-01 | Stop reason: HOSPADM

## 2024-02-29 RX ORDER — LIDOCAINE HYDROCHLORIDE 20 MG/ML
INJECTION, SOLUTION INFILTRATION; PERINEURAL PRN
Status: DISCONTINUED | OUTPATIENT
Start: 2024-02-29 | End: 2024-02-29

## 2024-02-29 RX ORDER — OXYCODONE HYDROCHLORIDE 5 MG/1
5 TABLET ORAL
Status: DISCONTINUED | OUTPATIENT
Start: 2024-02-29 | End: 2024-03-01 | Stop reason: HOSPADM

## 2024-02-29 RX ORDER — IBUPROFEN 600 MG/1
600 TABLET, FILM COATED ORAL ONCE
Status: DISCONTINUED | OUTPATIENT
Start: 2024-02-29 | End: 2024-03-01 | Stop reason: HOSPADM

## 2024-02-29 RX ORDER — DEXAMETHASONE SODIUM PHOSPHATE 4 MG/ML
INJECTION, SOLUTION INTRA-ARTICULAR; INTRALESIONAL; INTRAMUSCULAR; INTRAVENOUS; SOFT TISSUE PRN
Status: DISCONTINUED | OUTPATIENT
Start: 2024-02-29 | End: 2024-02-29

## 2024-02-29 RX ORDER — ACETAMINOPHEN 325 MG/1
975 TABLET ORAL ONCE
Status: DISCONTINUED | OUTPATIENT
Start: 2024-02-29 | End: 2024-03-01 | Stop reason: HOSPADM

## 2024-02-29 RX ORDER — LIDOCAINE 40 MG/G
CREAM TOPICAL
Status: DISCONTINUED | OUTPATIENT
Start: 2024-02-29 | End: 2024-03-01 | Stop reason: HOSPADM

## 2024-02-29 RX ORDER — ONDANSETRON 2 MG/ML
4 INJECTION INTRAMUSCULAR; INTRAVENOUS EVERY 30 MIN PRN
Status: DISCONTINUED | OUTPATIENT
Start: 2024-02-29 | End: 2024-03-01 | Stop reason: HOSPADM

## 2024-02-29 RX ORDER — ACETAMINOPHEN 325 MG/1
975 TABLET ORAL ONCE
Status: COMPLETED | OUTPATIENT
Start: 2024-02-29 | End: 2024-02-29

## 2024-02-29 RX ORDER — NALOXONE HYDROCHLORIDE 0.4 MG/ML
0.1 INJECTION, SOLUTION INTRAMUSCULAR; INTRAVENOUS; SUBCUTANEOUS
Status: DISCONTINUED | OUTPATIENT
Start: 2024-02-29 | End: 2024-03-01 | Stop reason: HOSPADM

## 2024-02-29 RX ORDER — PROPOFOL 10 MG/ML
INJECTION, EMULSION INTRAVENOUS CONTINUOUS PRN
Status: DISCONTINUED | OUTPATIENT
Start: 2024-02-29 | End: 2024-02-29

## 2024-02-29 RX ORDER — LIDOCAINE HYDROCHLORIDE 10 MG/ML
INJECTION, SOLUTION EPIDURAL; INFILTRATION; INTRACAUDAL; PERINEURAL PRN
Status: DISCONTINUED | OUTPATIENT
Start: 2024-02-29 | End: 2024-02-29 | Stop reason: HOSPADM

## 2024-02-29 RX ORDER — EPHEDRINE SULFATE 50 MG/ML
INJECTION, SOLUTION INTRAMUSCULAR; INTRAVENOUS; SUBCUTANEOUS PRN
Status: DISCONTINUED | OUTPATIENT
Start: 2024-02-29 | End: 2024-02-29

## 2024-02-29 RX ORDER — FENTANYL CITRATE 50 UG/ML
INJECTION, SOLUTION INTRAMUSCULAR; INTRAVENOUS PRN
Status: DISCONTINUED | OUTPATIENT
Start: 2024-02-29 | End: 2024-02-29

## 2024-02-29 RX ORDER — ONDANSETRON 2 MG/ML
INJECTION INTRAMUSCULAR; INTRAVENOUS PRN
Status: DISCONTINUED | OUTPATIENT
Start: 2024-02-29 | End: 2024-02-29

## 2024-02-29 RX ORDER — GLYCOPYRROLATE 0.2 MG/ML
INJECTION, SOLUTION INTRAMUSCULAR; INTRAVENOUS PRN
Status: DISCONTINUED | OUTPATIENT
Start: 2024-02-29 | End: 2024-02-29

## 2024-02-29 RX ORDER — SODIUM CHLORIDE, SODIUM LACTATE, POTASSIUM CHLORIDE, CALCIUM CHLORIDE 600; 310; 30; 20 MG/100ML; MG/100ML; MG/100ML; MG/100ML
INJECTION, SOLUTION INTRAVENOUS CONTINUOUS
Status: DISCONTINUED | OUTPATIENT
Start: 2024-02-29 | End: 2024-03-01 | Stop reason: HOSPADM

## 2024-02-29 RX ADMIN — Medication 100 MCG: at 07:33

## 2024-02-29 RX ADMIN — SODIUM CHLORIDE, SODIUM LACTATE, POTASSIUM CHLORIDE, CALCIUM CHLORIDE: 600; 310; 30; 20 INJECTION, SOLUTION INTRAVENOUS at 07:58

## 2024-02-29 RX ADMIN — PROPOFOL 200 MCG/KG/MIN: 10 INJECTION, EMULSION INTRAVENOUS at 07:05

## 2024-02-29 RX ADMIN — Medication 100 MCG: at 07:39

## 2024-02-29 RX ADMIN — ONDANSETRON 4 MG: 2 INJECTION INTRAMUSCULAR; INTRAVENOUS at 07:14

## 2024-02-29 RX ADMIN — DEXAMETHASONE SODIUM PHOSPHATE 4 MG: 4 INJECTION, SOLUTION INTRA-ARTICULAR; INTRALESIONAL; INTRAMUSCULAR; INTRAVENOUS; SOFT TISSUE at 07:14

## 2024-02-29 RX ADMIN — FENTANYL CITRATE 50 MCG: 50 INJECTION, SOLUTION INTRAMUSCULAR; INTRAVENOUS at 07:07

## 2024-02-29 RX ADMIN — Medication 200 MCG: at 07:23

## 2024-02-29 RX ADMIN — ACETAMINOPHEN 975 MG: 325 TABLET ORAL at 06:42

## 2024-02-29 RX ADMIN — LIDOCAINE HYDROCHLORIDE 3 ML: 20 INJECTION, SOLUTION INFILTRATION; PERINEURAL at 07:04

## 2024-02-29 RX ADMIN — FENTANYL CITRATE 25 MCG: 50 INJECTION, SOLUTION INTRAMUSCULAR; INTRAVENOUS at 07:27

## 2024-02-29 RX ADMIN — ONDANSETRON 4 MG: 2 INJECTION INTRAMUSCULAR; INTRAVENOUS at 08:28

## 2024-02-29 RX ADMIN — SODIUM CHLORIDE, SODIUM LACTATE, POTASSIUM CHLORIDE, CALCIUM CHLORIDE: 600; 310; 30; 20 INJECTION, SOLUTION INTRAVENOUS at 06:41

## 2024-02-29 RX ADMIN — FENTANYL CITRATE 25 MCG: 50 INJECTION, SOLUTION INTRAMUSCULAR; INTRAVENOUS at 07:26

## 2024-02-29 RX ADMIN — EPHEDRINE SULFATE 5 MG: 50 INJECTION, SOLUTION INTRAMUSCULAR; INTRAVENOUS; SUBCUTANEOUS at 07:43

## 2024-02-29 RX ADMIN — EPHEDRINE SULFATE 5 MG: 50 INJECTION, SOLUTION INTRAMUSCULAR; INTRAVENOUS; SUBCUTANEOUS at 07:25

## 2024-02-29 RX ADMIN — Medication 100 MCG: at 07:49

## 2024-02-29 RX ADMIN — GLYCOPYRROLATE 0.2 MG: 0.2 INJECTION, SOLUTION INTRAMUSCULAR; INTRAVENOUS at 07:21

## 2024-02-29 RX ADMIN — Medication 100 MCG: at 07:20

## 2024-02-29 ASSESSMENT — ENCOUNTER SYMPTOMS
SEIZURES: 0
DYSRHYTHMIAS: 0

## 2024-02-29 ASSESSMENT — COPD QUESTIONNAIRES: COPD: 0

## 2024-02-29 ASSESSMENT — LIFESTYLE VARIABLES: TOBACCO_USE: 0

## 2024-02-29 NOTE — DISCHARGE INSTRUCTIONS
You received 975 mg of acetaminophen (Tylenol) at 6:42 AM. Please do not take an additional dose of Tylenol until after 12:42 PM.    Do not exceed 4,000 mg of acetaminophen in a 24 hour period, keep in mind that acetaminophen can also be found in many over-the-counter cold medications as well as narcotics that may be given for pain.            If you have any questions or concerns regarding your procedure, please contact Dr. Daily, her office number is [phone].

## 2024-02-29 NOTE — ANESTHESIA PREPROCEDURE EVALUATION
Anesthesia Pre-Procedure Evaluation    Patient: Cortney Nagy   MRN: [id number] : 1950        Procedure : Procedure(s):  HYSTEROSCOPY WITH BIOPSY OF ENDOMETRIUM AND/OR POLYPECTOMY          Past Medical History:   Diagnosis Date    Anemia     Anxiety     Cerebral aneurysm, nonruptured     Depression     Fainting     Hyperlipemia     Mixed hyperlipidemia     Painful swelling of joint     PONV (postoperative nausea and vomiting)     Raynaud's disease without gangrene     Transient global amnesia       Past Surgical History:   Procedure Laterality Date    COLONOSCOPY      EYE SURGERY      RETINA SURGERY,  left eye twice and right eye once      Allergies   Allergen Reactions    Clindamycin Swelling     Throat tightness    Aleve [Naproxen] Dizziness    Codeine Other (See Comments)     Hallucinations.    Penicillins Hives    Propoxyphene N-Acetaminophen [Propoxyphene N-Apap] Unknown    Sulfa (Sulfonamide Antibiotics) [Sulfa Antibiotics] Dizziness      Social History     Tobacco Use    Smoking status: Never    Smokeless tobacco: Never   Substance Use Topics    Alcohol use: Not Currently     Comment: Occ      Wt Readings from Last 1 Encounters:   10/04/23 55.8 kg (123 lb)        Anesthesia Evaluation   Pt has had prior anesthetic.     History of anesthetic complications  - PONV.      ROS/MED HX  ENT/Pulmonary:    (-) tobacco use, asthma, COPD, sleep apnea, MARIELENA risk factors and recent URI   Neurologic:    (-) no seizures and no CVA   Cardiovascular:     (+) Dyslipidemia - -   -  - -                                   (-) hypertension, taking anticoagulants/antiplatelets, syncope and arrhythmias   METS/Exercise Tolerance: >4 METS    Hematologic:    (-) anemia   Musculoskeletal:   (+)  arthritis,             GI/Hepatic:    (-) GERD   Renal/Genitourinary:    (-) renal disease   Endo:    (-) Type I DM, Type II DM, thyroid disease and obesity   Psychiatric/Substance Use:     (+) psychiatric history depression    (-)  "chronic opioid use history   Infectious Disease:    (-) Recent Fever   Malignancy:       Other:            Physical Exam    Airway        Mallampati: II   TM distance: > 3 FB   Neck ROM: full   Mouth opening: > 3 cm    Respiratory Devices and Support         Dental     Comment: Good dentition, no loose or removable teeth        Cardiovascular          Rhythm and rate: regular and normal     Pulmonary           breath sounds clear to auscultation           OUTSIDE LABS:  CBC:   Lab Results   Component Value Date    WBC 5.3 11/07/2019    HGB 12.0 08/23/2023    HGB 12.2 11/07/2019    HCT 38.5 11/07/2019     11/09/2019     11/07/2019     BMP:   Lab Results   Component Value Date     02/27/2024     11/02/2022    POTASSIUM 4.3 02/27/2024    POTASSIUM 4.3 11/02/2022    CHLORIDE 101 02/27/2024    CHLORIDE 105 11/02/2022    CO2 26 02/27/2024    CO2 26 11/02/2022    BUN 15.4 02/27/2024    BUN 16.3 11/02/2022    CR 0.75 02/27/2024    CR 0.75 11/02/2022    GLC 89 02/27/2024    GLC 90 11/02/2023     COAGS:   Lab Results   Component Value Date    PTT 28 11/07/2019    INR 1.01 11/07/2019     POC: No results found for: \"BGM\", \"HCG\", \"HCGS\"  HEPATIC:   Lab Results   Component Value Date    ALBUMIN 4.3 11/02/2022    PROTTOTAL 6.2 (L) 11/02/2022    ALT 13 11/02/2022    AST 19 11/02/2022    ALKPHOS 44 11/02/2022    BILITOTAL 0.6 11/02/2022     OTHER:   Lab Results   Component Value Date    DANIELLA 9.5 02/27/2024    TSH 0.97 11/02/2022       Anesthesia Plan    ASA Status:  2    NPO Status:  NPO Appropriate    Anesthesia Type: MAC.              Consents    Anesthesia Plan(s) and associated risks, benefits, and realistic alternatives discussed. Questions answered and patient/representative(s) expressed understanding.     - Discussed:     - Discussed with:  Patient      - Extended Intubation/Ventilatory Support Discussed: No.      - Patient is DNR/DNI Status: No     Use of blood products discussed: No . "     Postoperative Care    Pain management: IV analgesics.   PONV prophylaxis: Ondansetron (or other 5HT-3), Dexamethasone or Solumedrol     Comments:    Other Comments: Acetaminophen 1 g (pre-op), will avoid NSAIDs given patient's adverse reaction historically   Dexamethasone, ondansetron PONV ppx           Paty Holman MD    I have reviewed the pertinent notes and labs in the chart from the past 30 days and (re)examined the patient.  Any updates or changes from those notes are reflected in this note.

## 2024-02-29 NOTE — OP NOTE
Hysteroscopy, D&C     Name: Cortney Nagy   MRN: [id number]   DATE OF SERVICE: 2/29/2024     PREOPERATIVE DIAGNOSIS: dysfunctional uterine bleeding, thickened endometrium    POSTOPERATIVE DIAGNOSIS: same, s/p hysteroscopy with EMB    PROCEDURES: Hysteroscopy, EMB    SURGEON: Demi Daily MD     ASSISTANT: Dr Haywood    ANESTHESIA: mac    ESTIMATED BLOOD LOSS: 3 cc     CONESENT:   This is a 73 year old female who was referred by ADAM Gillespie for evaluation of PMB with a thickened endometrium lining.  Endometrial lining was 12 mm on US.  EMB attempted in the office but not successful due to stenotic cervix.  Recommended hysteroscopy with endometrial sampling in the OR.  She was given informed consent for the above procedure. The risks, benefits and alternatives were discussed with her. She understood these to be, but not limited to injury to adjacent organs including bladder, bowel, ureter, infection and bleedingShe expressed understanding and wished to proceed.     PROCEDURE:   The patient was brought to the operating room and after induction of mac anesthesia was prepped and draped in sterile fashion in the dorsal lithotomy position. A time out was called and the patient and the procedure were verified. A bimanual exam was done, indicating retroverted uterus. No other abnormalities were noted.     The bladder was drained with a straight catheter.  A sterile open sided speculum was placed. The anterior lip of the cervix were grasped with single tooth tenaculum. The external os was very stenotic, small lacrimal dilators used to dilate through the external os, however internal os also found to be stenotic.  The external os was dilated to allow passage of the diagnostic hysteroscope, however internal os still found to be quite stenotic.  At this point Dr Haywood was called in to the OR, she also attempted with small dilators and was able to dilate through the internal os.  The diagnostic hysteroscope was  inserted into the uterus, bilateral tubal ostia visualized and appeared normal.  There was no focal pathology.  Normal appearing endometrium.      At this point endometrial biopsy were obtained using an explora pipelle. All quadrants were sampled, and the tissue was submitted for pathologic exam. At this point good hemostasis was noted. Instruments were removed from vagina. No active bleeding was noted. Sponge and needle counts were correct X 2. She was taken to recovery in stable condition.     Demi Daily MD

## 2024-02-29 NOTE — H&P
H&P on 2/27 reviewed, patient seen and interviewed in pre-op holding area, reviewed risks of procedure including but not limited to pain, bleeding, infection, uterine perforation, benefit of tissue diagnosis.    MD Manoj

## 2024-02-29 NOTE — ANESTHESIA POSTPROCEDURE EVALUATION
Patient: Cortney Nagy    Procedure: Procedure(s):  DIAGNOSTIC HYSTEROSCOPY WITH BIOPSY OF ENDOMETRIUM       Anesthesia Type:  MAC    Note:  Disposition: Outpatient   Postop Pain Control: Uneventful            Sign Out: Well controlled pain   PONV: No   Neuro/Psych: Uneventful            Sign Out: Acceptable/Baseline neuro status   Airway/Respiratory: Uneventful            Sign Out: Acceptable/Baseline resp. status   CV/Hemodynamics: Uneventful            Sign Out: Acceptable CV status; No obvious hypovolemia; No obvious fluid overload   Other NRE: NONE   DID A NON-ROUTINE EVENT OCCUR? No           Last vitals:  Vitals Value Taken Time   BP 94/52 02/29/24 0845   Temp 96.9  F (36.1  C) 02/29/24 0809   Pulse 79 02/29/24 0854   Resp 16 02/29/24 0809   SpO2 98 % 02/29/24 0854   Vitals shown include unfiled device data.    Electronically Signed By: Paty Holman MD  February 29, 2024  1:02 PM

## 2024-02-29 NOTE — ANESTHESIA CARE TRANSFER NOTE
Patient: Cortney Nagy    Procedure: Procedure(s):  DIAGNOSTIC HYSTEROSCOPY WITH BIOPSY OF ENDOMETRIUM       Diagnosis: PMB (postmenopausal bleeding) [N95.0]  Diagnosis Additional Information: No value filed.    Anesthesia Type:   MAC     Note:    Oropharynx: oropharynx clear of all foreign objects and spontaneously breathing  Level of Consciousness: drowsy  Oxygen Supplementation: room air    Independent Airway: airway patency satisfactory and stable  Dentition: dentition unchanged  Vital Signs Stable: post-procedure vital signs reviewed and stable  Report to RN Given: handoff report given  Patient transferred to: Phase II    Handoff Report: Identifed the Patient, Identified the Reponsible Provider, Reviewed the pertinent medical history, Discussed the surgical course, Reviewed Intra-OP anesthesia mangement and issues during anesthesia, Set expectations for post-procedure period and Allowed opportunity for questions and acknowledgement of understanding      Vitals:  Vitals Value Taken Time   BP 91/55 02/29/24 0809   Temp 96.9  F (36.1  C) 02/29/24 0809   Pulse 96 02/29/24 0810   Resp 16 02/29/24 0809   SpO2 95 % 02/29/24 0810   Vitals shown include unfiled device data.    Electronically Signed By: DARIA Vasquez CRNA  February 29, 2024  8:14 AM

## 2024-03-01 ENCOUNTER — TRANSFERRED RECORDS (OUTPATIENT)
Dept: HEALTH INFORMATION MANAGEMENT | Facility: CLINIC | Age: 74
End: 2024-03-01

## 2024-03-08 ENCOUNTER — TRANSFERRED RECORDS (OUTPATIENT)
Dept: HEALTH INFORMATION MANAGEMENT | Facility: CLINIC | Age: 74
End: 2024-03-08

## 2024-03-13 ENCOUNTER — TRANSFERRED RECORDS (OUTPATIENT)
Dept: HEALTH INFORMATION MANAGEMENT | Facility: CLINIC | Age: 74
End: 2024-03-13

## 2024-03-20 ENCOUNTER — TRANSFERRED RECORDS (OUTPATIENT)
Dept: HEALTH INFORMATION MANAGEMENT | Facility: CLINIC | Age: 74
End: 2024-03-20

## 2024-04-02 RX ORDER — VANCOMYCIN HYDROCHLORIDE 1 G/200ML
1000 INJECTION, SOLUTION INTRAVENOUS
Status: CANCELLED | OUTPATIENT
Start: 2024-04-02

## 2024-04-03 ENCOUNTER — LAB REQUISITION (OUTPATIENT)
Dept: LAB | Facility: CLINIC | Age: 74
End: 2024-04-03

## 2024-04-03 ENCOUNTER — ANESTHESIA EVENT (OUTPATIENT)
Dept: SURGERY | Facility: CLINIC | Age: 74
End: 2024-04-03
Payer: COMMERCIAL

## 2024-04-03 DIAGNOSIS — C50.912 MALIGNANT NEOPLASM OF UNSPECIFIED SITE OF LEFT FEMALE BREAST (H): ICD-10-CM

## 2024-04-03 LAB
ANION GAP SERPL CALCULATED.3IONS-SCNC: 13 MMOL/L (ref 7–15)
BUN SERPL-MCNC: 13.5 MG/DL (ref 8–23)
CALCIUM SERPL-MCNC: 10 MG/DL (ref 8.8–10.2)
CHLORIDE SERPL-SCNC: 103 MMOL/L (ref 98–107)
CREAT SERPL-MCNC: 0.84 MG/DL (ref 0.51–0.95)
DEPRECATED HCO3 PLAS-SCNC: 25 MMOL/L (ref 22–29)
EGFRCR SERPLBLD CKD-EPI 2021: 73 ML/MIN/1.73M2
GLUCOSE SERPL-MCNC: 99 MG/DL (ref 70–99)
POTASSIUM SERPL-SCNC: 3.9 MMOL/L (ref 3.4–5.3)
SODIUM SERPL-SCNC: 141 MMOL/L (ref 135–145)

## 2024-04-03 PROCEDURE — 80048 BASIC METABOLIC PNL TOTAL CA: CPT | Performed by: PHYSICIAN ASSISTANT

## 2024-04-03 NOTE — ANESTHESIA PREPROCEDURE EVALUATION
Anesthesia Pre-Procedure Evaluation    Patient: Cortney Nagy   MRN: [id number] : 1950        Procedure : Procedure(s):  wide local excision of left breast mass cancer, left lymphatic mapping, left sentinel lymph node biopsy transfer of tissue/advancement flap creation for closure of defect          Past Medical History:   Diagnosis Date     Anemia      Anxiety      Cerebral aneurysm, nonruptured      Depression      Fainting      Hyperlipemia      Mixed hyperlipidemia      Painful swelling of joint      PONV (postoperative nausea and vomiting)      Raynaud's disease without gangrene      Transient global amnesia       Past Surgical History:   Procedure Laterality Date     COLONOSCOPY       DILATION AND CURETTAGE, OPERATIVE HYSTEROSCOPY, COMBINED N/A 2024    Procedure: DIAGNOSTIC HYSTEROSCOPY WITH BIOPSY OF ENDOMETRIUM;  Surgeon: Demi Daily MD;  Location: [facility] OR     EYE SURGERY      RETINA SURGERY,  left eye twice and right eye once      Allergies   Allergen Reactions     Clindamycin Swelling     Throat tightness     Aleve [Naproxen] Dizziness     Codeine Other (See Comments)     Hallucinations.     Penicillins Hives     Propoxyphene N-Acetaminophen [Propoxyphene N-Apap] Unknown     Sulfa (Sulfonamide Antibiotics) [Sulfa Antibiotics] Dizziness      Social History     Tobacco Use     Smoking status: Never     Smokeless tobacco: Never   Substance Use Topics     Alcohol use: Not Currently     Comment: Occ      Wt Readings from Last 1 Encounters:   10/04/23 55.8 kg (123 lb)        Anesthesia Evaluation   Pt has had prior anesthetic. Type: MAC.    History of anesthetic complications  - PONV.      ROS/MED HX  ENT/Pulmonary:       Neurologic:       Cardiovascular:       METS/Exercise Tolerance:     Hematologic:       Musculoskeletal:       GI/Hepatic:       Renal/Genitourinary:       Endo:       Psychiatric/Substance Use:       Infectious Disease:       Malignancy:       Other:       "         OUTSIDE LABS:  CBC:   Lab Results   Component Value Date    WBC 5.3 11/07/2019    HGB 12.0 08/23/2023    HGB 12.2 11/07/2019    HCT 38.5 11/07/2019     11/09/2019     11/07/2019     BMP:   Lab Results   Component Value Date     02/27/2024     11/02/2022    POTASSIUM 4.3 02/27/2024    POTASSIUM 4.3 11/02/2022    CHLORIDE 101 02/27/2024    CHLORIDE 105 11/02/2022    CO2 26 02/27/2024    CO2 26 11/02/2022    BUN 15.4 02/27/2024    BUN 16.3 11/02/2022    CR 0.75 02/27/2024    CR 0.75 11/02/2022    GLC 89 02/27/2024    GLC 90 11/02/2023     COAGS:   Lab Results   Component Value Date    PTT 28 11/07/2019    INR 1.01 11/07/2019     POC: No results found for: \"BGM\", \"HCG\", \"HCGS\"  HEPATIC:   Lab Results   Component Value Date    ALBUMIN 4.3 11/02/2022    PROTTOTAL 6.2 (L) 11/02/2022    ALT 13 11/02/2022    AST 19 11/02/2022    ALKPHOS 44 11/02/2022    BILITOTAL 0.6 11/02/2022     OTHER:   Lab Results   Component Value Date    DANIELLA 9.5 02/27/2024    TSH 0.97 11/02/2022       Anesthesia Plan    ASA Status:  3       Anesthesia Type: General.     - Airway: ETT   Induction: Intravenous.   Maintenance: Balanced.   Techniques and Equipment:     - Lines/Monitors: 2nd IV     - Blood: T&S     Consents            Postoperative Care    Pain management: Multi-modal analgesia.   PONV prophylaxis: Ondansetron (or other 5HT-3), Dexamethasone or Solumedrol, Aprepitant     Comments:               Brittani Jain MD    I have reviewed the pertinent notes and labs in the chart from the past 30 days and (re)examined the patient.  Any updates or changes from those notes are reflected in this note.                  "

## 2024-04-04 PROBLEM — G45.4 TRANSIENT GLOBAL AMNESIA: Status: ACTIVE | Noted: 2019-11-07

## 2024-04-04 PROBLEM — K57.30 DIVERTICULAR DISEASE OF LARGE INTESTINE: Status: ACTIVE | Noted: 2021-09-01

## 2024-04-04 PROBLEM — D12.2 BENIGN NEOPLASM OF ASCENDING COLON: Status: ACTIVE | Noted: 2018-08-20

## 2024-04-04 PROBLEM — F41.9 ANXIETY: Status: ACTIVE | Noted: 2019-11-07

## 2024-04-04 PROBLEM — K59.00 CONSTIPATION: Status: ACTIVE | Noted: 2018-08-16

## 2024-04-04 PROBLEM — E78.5 HLD (HYPERLIPIDEMIA): Status: ACTIVE | Noted: 2024-04-04

## 2024-04-04 PROBLEM — Z86.0100 HISTORY OF COLONIC POLYPS: Status: ACTIVE | Noted: 2018-08-16

## 2024-04-05 ENCOUNTER — APPOINTMENT (OUTPATIENT)
Dept: NUCLEAR MEDICINE | Facility: CLINIC | Age: 74
End: 2024-04-05
Attending: SURGERY
Payer: COMMERCIAL

## 2024-04-05 ENCOUNTER — HOSPITAL ENCOUNTER (OUTPATIENT)
Facility: CLINIC | Age: 74
Discharge: HOME | End: 2024-04-05
Attending: INTERNAL MEDICINE | Admitting: SURGERY
Payer: COMMERCIAL

## 2024-04-05 ENCOUNTER — ANCILLARY PROCEDURE (OUTPATIENT)
Dept: MAMMOGRAPHY | Facility: CLINIC | Age: 74
End: 2024-04-05
Attending: SURGERY
Payer: COMMERCIAL

## 2024-04-05 ENCOUNTER — ANESTHESIA (OUTPATIENT)
Dept: SURGERY | Facility: CLINIC | Age: 74
End: 2024-04-05
Payer: COMMERCIAL

## 2024-04-05 VITALS
BODY MASS INDEX: 19.93 KG/M2 | HEART RATE: 75 BPM | DIASTOLIC BLOOD PRESSURE: 62 MMHG | HEIGHT: 67 IN | WEIGHT: 126.98 LBS | OXYGEN SATURATION: 98 % | SYSTOLIC BLOOD PRESSURE: 105 MMHG | TEMPERATURE: 98.7 F | RESPIRATION RATE: 17 BRPM

## 2024-04-05 DIAGNOSIS — Z17.0 MALIGNANT NEOPLASM OF OVERLAPPING SITES OF LEFT BREAST IN FEMALE, ESTROGEN RECEPTOR POSITIVE (H): Primary | ICD-10-CM

## 2024-04-05 DIAGNOSIS — C50.112 MALIGNANT NEOPLASM OF CENTRAL PORTION OF LEFT BREAST IN FEMALE, ESTROGEN RECEPTOR POSITIVE (H): ICD-10-CM

## 2024-04-05 DIAGNOSIS — C50.812 MALIGNANT NEOPLASM OF OVERLAPPING SITES OF LEFT BREAST IN FEMALE, ESTROGEN RECEPTOR POSITIVE (H): Primary | ICD-10-CM

## 2024-04-05 DIAGNOSIS — Z17.0 MALIGNANT NEOPLASM OF CENTRAL PORTION OF LEFT BREAST IN FEMALE, ESTROGEN RECEPTOR POSITIVE (H): ICD-10-CM

## 2024-04-05 LAB
ERYTHROCYTE [DISTWIDTH] IN BLOOD BY AUTOMATED COUNT: 14.1 % (ref 10–15)
HCT VFR BLD AUTO: 36.7 % (ref 35–47)
HGB BLD-MCNC: 11.5 G/DL (ref 11.7–15.7)
MCH RBC QN AUTO: 29.5 PG (ref 26.5–33)
MCHC RBC AUTO-ENTMCNC: 31.3 G/DL (ref 31.5–36.5)
MCV RBC AUTO: 94 FL (ref 78–100)
PLATELET # BLD AUTO: 365 10E3/UL (ref 150–450)
RBC # BLD AUTO: 3.9 10E6/UL (ref 3.8–5.2)
WBC # BLD AUTO: 6.9 10E3/UL (ref 4–11)

## 2024-04-05 PROCEDURE — 88307 TISSUE EXAM BY PATHOLOGIST: CPT | Mod: 26 | Performed by: PATHOLOGY

## 2024-04-05 PROCEDURE — 250N000013 HC RX MED GY IP 250 OP 250 PS 637: Performed by: ANESTHESIOLOGY

## 2024-04-05 PROCEDURE — 88341 IMHCHEM/IMCYTCHM EA ADD ANTB: CPT | Mod: TC | Performed by: SURGERY

## 2024-04-05 PROCEDURE — 258N000003 HC RX IP 258 OP 636: Performed by: NURSE ANESTHETIST, CERTIFIED REGISTERED

## 2024-04-05 PROCEDURE — 99100 ANES PT EXTEME AGE<1 YR&>70: CPT | Performed by: NURSE ANESTHETIST, CERTIFIED REGISTERED

## 2024-04-05 PROCEDURE — 710N000009 HC RECOVERY PHASE 1, LEVEL 1, PER MIN: Performed by: SURGERY

## 2024-04-05 PROCEDURE — 38792 RA TRACER ID OF SENTINL NODE: CPT

## 2024-04-05 PROCEDURE — 19301 PARTIAL MASTECTOMY: CPT | Performed by: NURSE ANESTHETIST, CERTIFIED REGISTERED

## 2024-04-05 PROCEDURE — 36415 COLL VENOUS BLD VENIPUNCTURE: CPT | Performed by: ANESTHESIOLOGY

## 2024-04-05 PROCEDURE — 85027 COMPLETE CBC AUTOMATED: CPT | Performed by: ANESTHESIOLOGY

## 2024-04-05 PROCEDURE — 710N000012 HC RECOVERY PHASE 2, PER MINUTE: Performed by: SURGERY

## 2024-04-05 PROCEDURE — 999N000141 HC STATISTIC PRE-PROCEDURE NURSING ASSESSMENT: Performed by: SURGERY

## 2024-04-05 PROCEDURE — 343N000001 HC RX 343: Performed by: SURGERY

## 2024-04-05 PROCEDURE — 250N000009 HC RX 250: Performed by: SURGERY

## 2024-04-05 PROCEDURE — 250N000011 HC RX IP 250 OP 636: Performed by: NURSE ANESTHETIST, CERTIFIED REGISTERED

## 2024-04-05 PROCEDURE — 88341 IMHCHEM/IMCYTCHM EA ADD ANTB: CPT | Mod: 26 | Performed by: PATHOLOGY

## 2024-04-05 PROCEDURE — 370N000017 HC ANESTHESIA TECHNICAL FEE, PER MIN: Performed by: SURGERY

## 2024-04-05 PROCEDURE — A9520 TC99 TILMANOCEPT DIAG 0.5MCI: HCPCS | Performed by: SURGERY

## 2024-04-05 PROCEDURE — 250N000009 HC RX 250: Performed by: NURSE ANESTHETIST, CERTIFIED REGISTERED

## 2024-04-05 PROCEDURE — 88329 PATH CONSLTJ DRG SURG: CPT | Performed by: PATHOLOGY

## 2024-04-05 PROCEDURE — 99100 ANES PT EXTEME AGE<1 YR&>70: CPT | Performed by: ANESTHESIOLOGY

## 2024-04-05 PROCEDURE — 250N000012 HC RX MED GY IP 250 OP 636 PS 637: Performed by: ANESTHESIOLOGY

## 2024-04-05 PROCEDURE — 99207 NM LYMPHOSCINTIGRAPHY INJECTION ONLY: CPT | Performed by: RADIOLOGY

## 2024-04-05 PROCEDURE — 19301 PARTIAL MASTECTOMY: CPT | Performed by: ANESTHESIOLOGY

## 2024-04-05 PROCEDURE — 88342 IMHCHEM/IMCYTCHM 1ST ANTB: CPT | Mod: 26 | Performed by: PATHOLOGY

## 2024-04-05 PROCEDURE — 272N000001 HC OR GENERAL SUPPLY STERILE: Performed by: SURGERY

## 2024-04-05 PROCEDURE — 250N000011 HC RX IP 250 OP 636: Performed by: ANESTHESIOLOGY

## 2024-04-05 PROCEDURE — 360N000076 HC SURGERY LEVEL 3, PER MIN: Performed by: SURGERY

## 2024-04-05 PROCEDURE — 250N000025 HC SEVOFLURANE, PER MIN: Performed by: SURGERY

## 2024-04-05 RX ORDER — NALOXONE HYDROCHLORIDE 0.4 MG/ML
0.1 INJECTION, SOLUTION INTRAMUSCULAR; INTRAVENOUS; SUBCUTANEOUS
Status: CANCELLED | OUTPATIENT
Start: 2024-04-05

## 2024-04-05 RX ORDER — NALOXONE HYDROCHLORIDE 0.4 MG/ML
0.1 INJECTION, SOLUTION INTRAMUSCULAR; INTRAVENOUS; SUBCUTANEOUS
Status: DISCONTINUED | OUTPATIENT
Start: 2024-04-05 | End: 2024-04-05 | Stop reason: HOSPADM

## 2024-04-05 RX ORDER — SODIUM CHLORIDE, SODIUM LACTATE, POTASSIUM CHLORIDE, CALCIUM CHLORIDE 600; 310; 30; 20 MG/100ML; MG/100ML; MG/100ML; MG/100ML
INJECTION, SOLUTION INTRAVENOUS CONTINUOUS PRN
Status: DISCONTINUED | OUTPATIENT
Start: 2024-04-05 | End: 2024-04-05

## 2024-04-05 RX ORDER — ISOSULFAN BLUE 50 MG/5ML
INJECTION, SOLUTION SUBCUTANEOUS PRN
Status: DISCONTINUED | OUTPATIENT
Start: 2024-04-05 | End: 2024-04-05 | Stop reason: HOSPADM

## 2024-04-05 RX ORDER — ONDANSETRON 2 MG/ML
INJECTION INTRAMUSCULAR; INTRAVENOUS PRN
Status: DISCONTINUED | OUTPATIENT
Start: 2024-04-05 | End: 2024-04-05

## 2024-04-05 RX ORDER — CEFDINIR 300 MG/1
300 CAPSULE ORAL 2 TIMES DAILY
Qty: 14 CAPSULE | Refills: 0 | Status: SHIPPED | OUTPATIENT
Start: 2024-04-05 | End: 2024-04-12

## 2024-04-05 RX ORDER — ONDANSETRON 2 MG/ML
4 INJECTION INTRAMUSCULAR; INTRAVENOUS EVERY 30 MIN PRN
Status: DISCONTINUED | OUTPATIENT
Start: 2024-04-05 | End: 2024-04-05 | Stop reason: HOSPADM

## 2024-04-05 RX ORDER — FENTANYL CITRATE 50 UG/ML
50 INJECTION, SOLUTION INTRAMUSCULAR; INTRAVENOUS EVERY 5 MIN PRN
Status: DISCONTINUED | OUTPATIENT
Start: 2024-04-05 | End: 2024-04-05 | Stop reason: HOSPADM

## 2024-04-05 RX ORDER — CEFAZOLIN SODIUM 1 G/3ML
INJECTION, POWDER, FOR SOLUTION INTRAMUSCULAR; INTRAVENOUS PRN
Status: DISCONTINUED | OUTPATIENT
Start: 2024-04-05 | End: 2024-04-05

## 2024-04-05 RX ORDER — PROPOFOL 10 MG/ML
INJECTION, EMULSION INTRAVENOUS CONTINUOUS PRN
Status: DISCONTINUED | OUTPATIENT
Start: 2024-04-05 | End: 2024-04-05

## 2024-04-05 RX ORDER — ACETAMINOPHEN 500 MG
500 TABLET ORAL EVERY 8 HOURS PRN
Qty: 30 TABLET | Refills: 0 | Status: SHIPPED | OUTPATIENT
Start: 2024-04-05 | End: 2024-04-15

## 2024-04-05 RX ORDER — EPHEDRINE SULFATE 50 MG/ML
INJECTION, SOLUTION INTRAMUSCULAR; INTRAVENOUS; SUBCUTANEOUS PRN
Status: DISCONTINUED | OUTPATIENT
Start: 2024-04-05 | End: 2024-04-05

## 2024-04-05 RX ORDER — CEFAZOLIN SODIUM 1 G/3ML
1 INJECTION, POWDER, FOR SOLUTION INTRAMUSCULAR; INTRAVENOUS EVERY 8 HOURS
Status: DISCONTINUED | OUTPATIENT
Start: 2024-04-05 | End: 2024-04-05 | Stop reason: HOSPADM

## 2024-04-05 RX ORDER — ONDANSETRON 4 MG/1
4 TABLET, ORALLY DISINTEGRATING ORAL EVERY 30 MIN PRN
Status: CANCELLED | OUTPATIENT
Start: 2024-04-05

## 2024-04-05 RX ORDER — LIDOCAINE HYDROCHLORIDE 20 MG/ML
INJECTION, SOLUTION INFILTRATION; PERINEURAL PRN
Status: DISCONTINUED | OUTPATIENT
Start: 2024-04-05 | End: 2024-04-05

## 2024-04-05 RX ORDER — LABETALOL HYDROCHLORIDE 5 MG/ML
10 INJECTION, SOLUTION INTRAVENOUS
Status: DISCONTINUED | OUTPATIENT
Start: 2024-04-05 | End: 2024-04-05 | Stop reason: HOSPADM

## 2024-04-05 RX ORDER — PROPOFOL 10 MG/ML
INJECTION, EMULSION INTRAVENOUS PRN
Status: DISCONTINUED | OUTPATIENT
Start: 2024-04-05 | End: 2024-04-05

## 2024-04-05 RX ORDER — HYDROMORPHONE HCL IN WATER/PF 6 MG/30 ML
0.4 PATIENT CONTROLLED ANALGESIA SYRINGE INTRAVENOUS EVERY 5 MIN PRN
Status: DISCONTINUED | OUTPATIENT
Start: 2024-04-05 | End: 2024-04-05 | Stop reason: HOSPADM

## 2024-04-05 RX ORDER — IBUPROFEN 800 MG/1
800 TABLET, FILM COATED ORAL EVERY 8 HOURS PRN
Qty: 30 TABLET | Refills: 0 | Status: SHIPPED | OUTPATIENT
Start: 2024-04-05 | End: 2024-04-15

## 2024-04-05 RX ORDER — INDOCYANINE GREEN AND WATER 25 MG
KIT INJECTION PRN
Status: DISCONTINUED | OUTPATIENT
Start: 2024-04-05 | End: 2024-04-05 | Stop reason: HOSPADM

## 2024-04-05 RX ORDER — SODIUM CHLORIDE, SODIUM LACTATE, POTASSIUM CHLORIDE, CALCIUM CHLORIDE 600; 310; 30; 20 MG/100ML; MG/100ML; MG/100ML; MG/100ML
INJECTION, SOLUTION INTRAVENOUS CONTINUOUS
Status: DISCONTINUED | OUTPATIENT
Start: 2024-04-05 | End: 2024-04-05 | Stop reason: HOSPADM

## 2024-04-05 RX ORDER — ONDANSETRON 4 MG/1
4 TABLET, ORALLY DISINTEGRATING ORAL EVERY 30 MIN PRN
Status: DISCONTINUED | OUTPATIENT
Start: 2024-04-05 | End: 2024-04-05 | Stop reason: HOSPADM

## 2024-04-05 RX ORDER — FENTANYL CITRATE 50 UG/ML
INJECTION, SOLUTION INTRAMUSCULAR; INTRAVENOUS PRN
Status: DISCONTINUED | OUTPATIENT
Start: 2024-04-05 | End: 2024-04-05

## 2024-04-05 RX ORDER — APREPITANT 40 MG/1
40 CAPSULE ORAL ONCE
Status: COMPLETED | OUTPATIENT
Start: 2024-04-05 | End: 2024-04-05

## 2024-04-05 RX ORDER — ONDANSETRON 2 MG/ML
4 INJECTION INTRAMUSCULAR; INTRAVENOUS EVERY 30 MIN PRN
Status: CANCELLED | OUTPATIENT
Start: 2024-04-05

## 2024-04-05 RX ORDER — ACETAMINOPHEN 325 MG/1
975 TABLET ORAL ONCE
Status: COMPLETED | OUTPATIENT
Start: 2024-04-05 | End: 2024-04-05

## 2024-04-05 RX ORDER — IBUPROFEN 200 MG
800 TABLET ORAL
Status: DISCONTINUED | OUTPATIENT
Start: 2024-04-05 | End: 2024-04-05 | Stop reason: HOSPADM

## 2024-04-05 RX ORDER — OXYCODONE HYDROCHLORIDE 5 MG/1
5 TABLET ORAL
Status: COMPLETED | OUTPATIENT
Start: 2024-04-05 | End: 2024-04-05

## 2024-04-05 RX ADMIN — PHENYLEPHRINE HYDROCHLORIDE 100 MCG: 10 INJECTION INTRAVENOUS at 14:03

## 2024-04-05 RX ADMIN — FENTANYL CITRATE 50 MCG: 50 INJECTION INTRAMUSCULAR; INTRAVENOUS at 14:46

## 2024-04-05 RX ADMIN — EPHEDRINE SULFATE 5 MG: 5 INJECTION INTRAVENOUS at 14:52

## 2024-04-05 RX ADMIN — EPHEDRINE SULFATE 5 MG: 5 INJECTION INTRAVENOUS at 13:23

## 2024-04-05 RX ADMIN — FENTANYL CITRATE 100 MCG: 50 INJECTION INTRAMUSCULAR; INTRAVENOUS at 12:38

## 2024-04-05 RX ADMIN — APREPITANT 40 MG: 40 CAPSULE ORAL at 12:03

## 2024-04-05 RX ADMIN — EPHEDRINE SULFATE 5 MG: 5 INJECTION INTRAVENOUS at 13:19

## 2024-04-05 RX ADMIN — ACETAMINOPHEN 975 MG: 325 TABLET, FILM COATED ORAL at 12:03

## 2024-04-05 RX ADMIN — FENTANYL CITRATE 50 MCG: 50 INJECTION, SOLUTION INTRAMUSCULAR; INTRAVENOUS at 16:37

## 2024-04-05 RX ADMIN — PHENYLEPHRINE HYDROCHLORIDE 50 MCG: 10 INJECTION INTRAVENOUS at 14:40

## 2024-04-05 RX ADMIN — EPHEDRINE SULFATE 5 MG: 5 INJECTION INTRAVENOUS at 14:01

## 2024-04-05 RX ADMIN — CEFAZOLIN 2 G: 1 INJECTION, POWDER, FOR SOLUTION INTRAMUSCULAR; INTRAVENOUS at 12:48

## 2024-04-05 RX ADMIN — OXYCODONE HYDROCHLORIDE 5 MG: 5 TABLET ORAL at 17:27

## 2024-04-05 RX ADMIN — EPHEDRINE SULFATE 5 MG: 5 INJECTION INTRAVENOUS at 13:37

## 2024-04-05 RX ADMIN — Medication 50 MG: at 12:43

## 2024-04-05 RX ADMIN — SODIUM CHLORIDE, POTASSIUM CHLORIDE, SODIUM LACTATE AND CALCIUM CHLORIDE: 600; 310; 30; 20 INJECTION, SOLUTION INTRAVENOUS at 15:09

## 2024-04-05 RX ADMIN — FENTANYL CITRATE 50 MCG: 50 INJECTION, SOLUTION INTRAMUSCULAR; INTRAVENOUS at 16:27

## 2024-04-05 RX ADMIN — PHENYLEPHRINE HYDROCHLORIDE 0.4 MCG/KG/MIN: 10 INJECTION INTRAVENOUS at 14:55

## 2024-04-05 RX ADMIN — PROPOFOL 20 MG: 10 INJECTION, EMULSION INTRAVENOUS at 14:46

## 2024-04-05 RX ADMIN — PROPOFOL 100 MG: 10 INJECTION, EMULSION INTRAVENOUS at 12:42

## 2024-04-05 RX ADMIN — ONDANSETRON 4 MG: 2 INJECTION INTRAMUSCULAR; INTRAVENOUS at 15:34

## 2024-04-05 RX ADMIN — PROPOFOL 30 MG: 10 INJECTION, EMULSION INTRAVENOUS at 13:41

## 2024-04-05 RX ADMIN — SODIUM CHLORIDE, POTASSIUM CHLORIDE, SODIUM LACTATE AND CALCIUM CHLORIDE: 600; 310; 30; 20 INJECTION, SOLUTION INTRAVENOUS at 12:28

## 2024-04-05 RX ADMIN — PHENYLEPHRINE HYDROCHLORIDE 50 MCG: 10 INJECTION INTRAVENOUS at 14:52

## 2024-04-05 RX ADMIN — HYDROMORPHONE HYDROCHLORIDE 0.4 MG: 0.2 INJECTION, SOLUTION INTRAMUSCULAR; INTRAVENOUS; SUBCUTANEOUS at 16:54

## 2024-04-05 RX ADMIN — PHENYLEPHRINE HYDROCHLORIDE 50 MCG: 10 INJECTION INTRAVENOUS at 14:27

## 2024-04-05 RX ADMIN — EPHEDRINE SULFATE 5 MG: 5 INJECTION INTRAVENOUS at 13:09

## 2024-04-05 RX ADMIN — LIDOCAINE HYDROCHLORIDE 80 MG: 20 INJECTION, SOLUTION INFILTRATION; PERINEURAL at 12:42

## 2024-04-05 RX ADMIN — PHENYLEPHRINE HYDROCHLORIDE 50 MCG: 10 INJECTION INTRAVENOUS at 13:41

## 2024-04-05 RX ADMIN — PROPOFOL 20 MCG/KG/MIN: 10 INJECTION, EMULSION INTRAVENOUS at 12:52

## 2024-04-05 ASSESSMENT — ACTIVITIES OF DAILY LIVING (ADL)
ADLS_ACUITY_SCORE: 33

## 2024-04-05 NOTE — ANESTHESIA PROCEDURE NOTES
Airway       Patient location during procedure: OR       Procedure Start/Stop Times: 4/5/2024 12:45 PM  Staff -        CRNA: Shruthi Griffith APRN CRNA       Performed By: CRNA  Consent for Airway        Urgency: elective  Indications and Patient Condition       Indications for airway management: nikko-procedural       Induction type:intravenous       Mask difficulty assessment: 1 - vent by mask    Final Airway Details       Final airway type: endotracheal airway       Successful airway: ETT - single  Endotracheal Airway Details        ETT size (mm): 7.0       Cuffed: yes       Successful intubation technique: direct laryngoscopy       DL Blade Type: Kaplan 2       Grade View of Cords: 1       Adjucts: stylet       Position: Right       Measured from: lips       Secured at (cm): 22    Post intubation assessment        Placement verified by: capnometry, equal breath sounds and chest rise        Number of attempts at approach: 1       Number of other approaches attempted: 0       Secured with: tape       Ease of procedure: easy       Dentition: Unchanged    Medication(s) Administered   Medication Administration Time: 4/5/2024 12:45 PM

## 2024-04-05 NOTE — ANESTHESIA POSTPROCEDURE EVALUATION
Patient: Cortney Nagy    Procedure: Procedure(s):  wide local excision of left breast mass cancer, left lymphatic mapping, left sentinel lymph node biopsy transfer of tissue/advancement flap creation for closure of defect       Anesthesia Type:  General    Note:  Disposition: Outpatient   Postop Pain Control: Uneventful            Sign Out: Well controlled pain   PONV: No   Neuro/Psych: Uneventful            Sign Out: Acceptable/Baseline neuro status   Airway/Respiratory: Uneventful            Sign Out: Acceptable/Baseline resp. status   CV/Hemodynamics: Uneventful            Sign Out: Acceptable CV status; No obvious hypovolemia; No obvious fluid overload   Other NRE: NONE   DID A NON-ROUTINE EVENT OCCUR? No           Last vitals:  Vitals Value Taken Time   /58 04/05/24 1745   Temp 36.8  C (98.2  F) 04/05/24 1615   Pulse 73 04/05/24 1759   Resp 17 04/05/24 1759   SpO2 99 % 04/05/24 1759   Vitals shown include unfiled device data.    Electronically Signed By: Katalina Monroe MD  April 5, 2024  6:00 PM

## 2024-04-05 NOTE — DISCHARGE INSTRUCTIONS
Contacting your Doctor -   To contact a doctor, call [phone] and ask for the resident on call for plastic surgery (answered 24 hours a day)   Emergency Department:  [facility]: [phone]  [facility]: [phone] 911 if you are in need of immediate or emergent help

## 2024-04-05 NOTE — OP NOTE
Operative Report     Preop Dx:  LEFT Breast Cancer     Postop Dx: LEFT Breast Cancer, Asymmetry of the LEFT breast     Procedure:  LEFT ultrasound-guided wire localization and partial mastectomy, LEFT lymphatic mapping with blue dye, ICG (Spy phi) and radiotracer, LEFT sentinel lymph node biopsy, LEFT rearrangement of tissue/transfer of tissue to close defect, 15 x 10cm, specimen radiograph (orthogonal views)     Surgeon: Dr. Trina Lam     Anesthesia: General     Fluids: Crystalloid     EBL: 5 cc     Specimen: LEFT breast cancer with possible skin metastasis vs skin invasion noted at the 4:00 axis, LEFT SLNs     No drains     No complications     Description of Procedure:   The patient was brought into the room and laid supine upon the table. Next she was prepped and draped in the usual sterile fashion. Lymphzurin blue, ICG and radiotracer for lymphatic mapping was injected in the usual fashion surrounding the left NAC.  Using ultrasound-guidance, a wire was placed from lateral to medial and posterior to the clip in the usual fashion. The mass location was also marked out on the skin.     Next the wire was followed and the large mass was excised with the areola/nipple skin lesion and NAC in its entirety in the usual fashion. The specimen was radiographed and the clip and mass were visualized and appeared to be well-encompassed and excised with the specimen. The clip was located centrally. This was then painted in the usual fashion, 12:00 axis was marked with a suture and was sent to pathology and they were able to identify the biopsy site, clip, wire. The mass appeared to be well-encompassed in the specimen grossly. Margins were negative on gross examination (closest inferior margin had a free margin with at least 3mm of tissue). Clips were placed to remberto the cavity margins superior, inferior, medial, lateral, anterior and posterior.    The tissue was divided down and the left SLN was identified with the  blue dye and radiotracer. The first SLN had a reading of 2806 and the second SLN encountered had a reading of 1217. There were also smaller nodes with ICG seen on SpyPhi that were taken nearby. The background reading was 70 after these were removed. Hemostasis was obtained.      The defect was closed by incising through the tissue and creating flaps from superior, inferior, lateral, medial and rearranging the tissue by transferring and rotating it into the defect. The defect measured 15 x 10cm.  The wound was then closed in multiple layers with 2-0 Vicryl layers interrupted in the deeper layers, followed by 4-0 Vicryl sutures interrupted in the deeper layers, followed by 5-0 Monocryl sutures running in the top layers, followed by mastisol, steristrips, and a dry sterile dressing. EBL was minimal. There were no complications.       Commission on Cancer Synoptic Operative Reports     [place] Node Biopsy for Breast Cancer - Left  Operation performed with curative intent Yes   Tracer(s) used to identify sentinel nodes in the upfront surgery (non-neoadjuvant) setting Dye, Radioactive tracer, and Other ICG (Spyphi) and Ultrasound   Tracer(s) used to identify sentinel nodes in the neoadjuvant setting N/A   All nodes (colored or non-colored) present at the end of a dye-filled lymphatic channel were removed Yes   All significantly radioactive nodes were removed Yes   All palpably suspicious nodes were removed N/A   Biopsy-proven positive nodes marked with clips prior to chemotherapy were identified and removed N/A        Trina Lam MD ....................  4/5/2024   4:16 PM

## 2024-04-12 ENCOUNTER — TRANSCRIBE ORDERS (OUTPATIENT)
Dept: OTHER | Age: 74
End: 2024-04-12

## 2024-04-12 ENCOUNTER — TRANSFERRED RECORDS (OUTPATIENT)
Dept: HEALTH INFORMATION MANAGEMENT | Facility: CLINIC | Age: 74
End: 2024-04-12
Payer: COMMERCIAL

## 2024-04-12 DIAGNOSIS — C50.912 MALIGNANT NEOPLASM OF UNSPECIFIED SITE OF LEFT FEMALE BREAST (H): Primary | ICD-10-CM

## 2024-04-12 LAB
PATH REPORT.COMMENTS IMP SPEC: ABNORMAL
PATH REPORT.COMMENTS IMP SPEC: YES
PATH REPORT.FINAL DX SPEC: ABNORMAL
PATH REPORT.GROSS SPEC: ABNORMAL
PATH REPORT.MICROSCOPIC SPEC OTHER STN: ABNORMAL
PATH REPORT.RELEVANT HX SPEC: ABNORMAL
PATHOLOGY SYNOPTIC REPORT: ABNORMAL
PHOTO IMAGE: ABNORMAL

## 2024-04-15 ENCOUNTER — TRANSCRIBE ORDERS (OUTPATIENT)
Dept: OTHER | Age: 74
End: 2024-04-15

## 2024-04-15 ENCOUNTER — PATIENT OUTREACH (OUTPATIENT)
Dept: ONCOLOGY | Facility: CLINIC | Age: 74
End: 2024-04-15
Payer: COMMERCIAL

## 2024-04-15 DIAGNOSIS — C50.912 INFILTRATING DUCTAL CARCINOMA OF LEFT FEMALE BREAST (H): Primary | ICD-10-CM

## 2024-04-15 DIAGNOSIS — R63.4 ABNORMAL WEIGHT LOSS: ICD-10-CM

## 2024-04-15 DIAGNOSIS — C50.912 MALIGNANT NEOPLASM OF UNSPECIFIED SITE OF LEFT FEMALE BREAST (H): ICD-10-CM

## 2024-04-15 NOTE — PROGRESS NOTES
New Patient Oncology Nurse Navigator Note     Referring provider: Trina Lam MD      Referring Clinic/Organization: Medical Oncology      Referred to (specialty:) Medical Oncology     Requested provider (if applicable): Dr. Abel Luis     Date Referral Received: April 12, 2024     Evaluation for:  C50.912 (ICD-10-CM) - Malignant neoplasm of unspecified site of left female breast (H)      Clinical History (per Nurse review of records provided):        2/21/24: Diagnostic left mammogram for palpable left breast mass at 3 O'clock position adjacent to Nipple.     3/1/24: Breast Biopsy.    3/13/24: Diagnostic right mammogram.     3/20/24: PET scan 3    4/5/24: Surgery wide local excision of left breast mass cancer, left lymphatic mapping, left sentinel lymph node biopsy transfer of tissue/advancement flap creation for close of defect. With Dr. Lam.     Surgical Pathology from 4/5/24:  Final Diagnosis   A. LEFT breast, ultrasound guided/wire-localized partial mastectomy:  -INVASIVE BREAST CARCINOMA OF NO SPECIAL TYPE (INVASIVE DUCTAL CARCINOMA), including single file pattern, JUNG GRADE 2, size 35 mm  -Ductal carcinoma in situ (DCIS), nuclear grade 2, cribriform and solid type(s)  -DCIS is admixed with and adjacent to invasive carcinoma, and comprises <10% of the tumor volume  -Lymphovascular invasion present  -Invasive carcinoma involves the nipple  -Invasive carcinoma involves the dermis, by direct extension  -Epidermis is uninvolved by carcinoma  -No epidermal ulceration  -Inferior margin is involved by invasive carcinoma  -Invasive carcinoma is 4 mm from the posterior margin and > 5 mm from the anterior, superior, medial and lateral margins  -Margins are uninvolved by DCIS  -DCIS is 5 mm from the nearest (inferior) margin and > 5 mm from the remaining margins  -Lobular carcinoma in situ (LCIS), classic type  -Other findings: fibrocystic change (including microcysts with apocrine metaplasia) and  "sclerosing adenosis  -Calcifications associated with invasive carcinoma, DCIS, and benign acini and stroma  -Prior core biopsy site changes  -See comment  -See tumor synoptic below     B. Lymph node, LEFT axillary, sentinel, excision:  -Isolated tumor cells in one of six lymph nodes (ITC 1/6), size 0.08 mm  -No extranodal extension identified   Electronically signed by Cris Stanley MD on 4/12/2024 at  4:00 PM   Comment  [id number]   The tumor shows variable architecture. Most of it is arranged in small, solid clusters with rare tubule formation. A minority of the tumor shows single file architecture, more typical of invasive lobular carcinoma. The tumor cells have centrally placed nuclei, without definite plasmacytoid or histiocytoid cytomorphology. The invasive carcinoma shows retained membranous expression of E-cadherin, even in the single file component. Therefore, it is all classified as invasive \"ductal\" carcinoma.   Synoptic Checklist   INVASIVE CARCINOMA OF THE BREAST: Resection   8th Edition - Protocol posted: 6/21/2023INVASIVE CARCINOMA OF THE BREAST: RESECTION - All Specimens  SPECIMEN   Procedure  Excision (less than total mastectomy)   Specimen Laterality  Left   TUMOR   Tumor Site  Central     Nipple     retroareolar   Histologic Type  Invasive carcinoma of no special type (ductal)   Histologic Grade (Mallory Histologic Score)     Glandular (Acinar) / Tubular Differentiation  Score 3   Nuclear Pleomorphism  Score 2   Mitotic Rate  Score 1   Overall Grade  Grade 2 (scores of 6 or 7)   Tumor Size  Greatest dimension of largest invasive focus (Millimeters): 35 mm   Additional Dimension (Millimeters)  32 mm     28 mm   Tumor Focality  Single focus of invasive carcinoma   Ductal Carcinoma In Situ (DCIS)  Present     Negative for extensive intraductal component (EIC)   Size (Extent) of DCIS  Estimated size (extent) of DCIS is at least (Millimeters): 15 mm   Architectural Patterns  Cribriform     " Solid   Nuclear Grade  Grade II (intermediate)   Necrosis  Not identified   Lobular Carcinoma In Situ (LCIS)  Present        Tumor Extent  Skin is present and involved   Skin Invasion  Carcinoma directly invades into the dermis or epidermis without skin ulceration (this does not change the T classification)   Skin Satellite Foci  Satellite foci not identified   Lymphatic and / or Vascular Invasion  Present     Focal   Dermal Lymphatic and / or Vascular Invasion  Not identified   Microcalcifications  Present in DCIS     Present in invasive carcinoma     Present in non-neoplastic tissue   Treatment Effect in the Breast  No known presurgical therapy   MARGINS   Margin Status for Invasive Carcinoma  Invasive carcinoma present at margin   Margin(s) Involved by Invasive Carcinoma  Inferior: tumor at inked margin for a length of < 1 mm, in one block (block A15)   Distance from Invasive Carcinoma to Anterior Margin  5 mm   Distance from Invasive Carcinoma to Posterior Margin  4 mm   Distance from Invasive Carcinoma to Superior Margin  Greater than: 5 mm   Distance from Invasive Carcinoma to Medial Margin  Greater than: 5 mm   Distance from Invasive Carcinoma to Lateral Margin  Greater than: 5 mm   Margin Status for DCIS  All margins negative for DCIS   Distance from DCIS to Closest Margin  5 mm   Closest Margin(s) to DCIS  Inferior   REGIONAL LYMPH NODES   Regional Lymph Node Status  Tumor present in regional lymph node(s)   Number of Lymph Nodes with Macrometastases  0   Number of Lymph Nodes with Micrometastases  0   Number of Lymph Nodes with Isolated Tumor Cells  1   Size of Largest [place] Metastatic Deposit  0.08 mm   Extranodal Extension  Not identified   Total Number of Lymph Nodes Examined (sentinel and non-sentinel)  6   Number of [place] Nodes Examined  6   pTNM CLASSIFICATION (AJCC 8th Edition)   Reporting of pT, pN, and (when applicable) pM categories is based on information available to the pathologist at the  time the report is issued. As per the AJCC (Chapter 1, 8th Ed.) it is the managing physician s responsibility to establish the final pathologic stage based upon all pertinent information, including but potentially not limited to this pathology report.   pT Category  pT2   pN Category  pN0 (i+)   SPECIAL STUDIES        Estrogen Receptor (ER) Status  Positive (greater than 10% of cells demonstrate nuclear positivity)   Percentage of Cells with Nuclear Positivity  95 %        Progesterone Receptor (PgR) Status  Positive   Percentage of Cells with Nuclear Positivity  53 %        HER2 (by immunohistochemistry)  Negative (Score 0)        Ki-67 Percentage of Positive Nuclei  16 %   Testing Performed on Case Number  Reported results from ER, PgR, HER2 and Ki-67 immunostains performed and interpreted by an outside lab on the patient's 3/1/24 LEFT breast core biopsy specimen (see their report [id number])   Comment(s)  Best block for ancillary testing: A12           Records Location: Media and See Bookmarked material     Records Needed: Clinic notes from Dr. Lam, imaging/reports, labs etc regarding breast cancer diagnosis.      Additional testing needed prior to consult: ?    Payor: BC / Plan: [email or web address] MEDICARE ADVANTAGE / Product Type: Medicare /     April 16, 2024    Called patient to introduced myself and role as nurse navigator with [facility] and to inform them that we have received the referral for a diagnosis of breast Cancer from Dr. Lam.     Patient did not answer the phone so a detailed message was left for them including NPS number. Requested callback to speak to a  to set the consult date/time/location. Explained to patient in the message that they will receive a call from our new patient scheduling team (NPS number below, hours are Monday - Friday 8am - 4:30 pm) in the next 1-2 business days to schedule the consultation. Encouraged them to call back with any  questions.     April 17, 2024  Patient called back this afternoon. Discussed referrals that had been received by Dr. Lam. Patient would like to be seen at [facility] location as it is closer to home and less stressful for her then going to the [facility]. Patient was transferred to NPS to schedule and message sent to Dr. Lam with an update.       Aster Webb, RN, BSN  [facility] Hematology/Oncology Nurse Navigator  [phone]

## 2024-04-15 NOTE — TELEPHONE ENCOUNTER
"FUTURE VISIT INFORMATION      FUTURE VISIT INFORMATION:  Date: 7/15/24  Time: 10:30am2  [facility]  REFERRAL INFORMATION:  Referring provider:  Betina Sandy APRN CNP   Referring providers clinic[facility]   Reason for visit/diagnosis  Per Pt, dx chronic cough referral from   Betina Sandy recs in epic     RECORDS REQUESTED FROM:       Clinic name Comments Records Status Imaging Status   [facility] speciality  12/4/23, 10/4/23 - OV Betina Sandy APRN CNP  Epic     Procedures  8/23/23- PFT  Epic             \"Please notify/message CSS if patient completed outside imaging prior to scheduled appointment and/or any outside records that might have been missed at pre visit -Thank you\"  "

## 2024-04-16 ENCOUNTER — PRE VISIT (OUTPATIENT)
Dept: ONCOLOGY | Facility: CLINIC | Age: 74
End: 2024-04-16
Payer: COMMERCIAL

## 2024-04-16 NOTE — TELEPHONE ENCOUNTER
RECORDS STATUS - BREAST    Action    Action Taken 24  LVM @ [facility] requested records.    Spoke w/ [facility] - they will push imaging, fax reports shortly.  10:16 AM    Records from Dr. Trina Lam/[facility] - sent to HIM, emailed to NN Email CC Aster SORENSON    Imaging reports from [facility] received, they make mention of imaging not received. Reports sent to [facility] for STAT upload, emailed to NN Email, CC Aster SORENSON     Spoke w/ [facility] - advised additional imaging done @ [facility]    Spoke w/ [facility], they will push imaging & fax reports.    Reports received, sent to [facility] for STAT upload.    Spoke w/ Arielle @ [facility] - advised pt has not been seen there.     Email sent to  [facility] to resolve Breast MG/US's.  2:07 PM    24  All imaging resolved to PACS.  3:10 PM      [facility]   DATE REQUESTED:    NOTES DETAILS STATUS   OFFICE NOTE from referring provider Received  Dr. Trina Lam/[facility]   OFFICE NOTE from surgeon Received  Dr. Lam   OPERATIVE REPORT Epic 24: Wide Local Excision of Left Breast Mass  24: Diagnostic Hysteroscopy w/ Bx   LABS     PATHOLOGY REPORTS  (Tissue diagnosis, Stage, ER/WV percentage positive and intensity of staining, HER2 IHC, FISH, and all biopsies from breast and any distant metastasis)                 Kamini Smith (report in CE)  [facility]amini [facility] [facility]  24: [id number]    Kamini  3/1/24: [id number]   IMAGING (NEED IMAGES & REPORT)     MRI PACS [facility]  3/20/24   MAMMO PACS [facility]  3/13/24, 24, 11/10/23, 22, 18    [facility]  21, 20, 3/23/17, 16, 2/10/15   ULTRASOUND PACS [facility]  3/1/24, 24

## 2024-04-18 NOTE — TELEPHONE ENCOUNTER
Action    Action Taken 4/16/24  LVM @ [facility] requested records.    Spoke w/ [facility] - they will push imaging, fax reports shortly.  10:16 AM    Records from Dr. Trina Lam/[facility] - sent to HIM, emailed to NN Email CC Aster SORENSON    Imaging reports from [facility] received, they make mention of imaging not received. Reports sent to HIM for STAT upload, emailed to NN Email, CC Aster MARINO.     Spoke w/ [facility] - advised additional imaging done @ [facility]    Spoke w/ [facility], they will push imaging & fax reports.    Reports received, sent to HIM for STAT upload.    Spoke w/ Arielle @ [facility] - advised pt has not been seen there.     Email sent to  [facility] to resolve Breast MG/US's.  2:07 PM    4/18/24  All imaging resolved to PACS.  3:10 PM      [facility] Epic   DATE REQUESTED:    NOTES DETAILS STATUS   OFFICE NOTE from referring provider Received 4/16 Dr. Trina Lam/[facility]   OFFICE NOTE from surgeon Received 4/16 Dr. Lam   OPERATIVE REPORT Epic 4/5/24: Wide Local Excision of Left Breast Mass  2/29/24: Diagnostic Hysteroscopy w/ Bx   LABS     PATHOLOGY REPORTS  (Tissue diagnosis, Stage, ER/DE percentage positive and intensity of staining, HER2 IHC, FISH, and all biopsies from breast and any distant metastasis)                 Kamini Smith (report in CE)   [facility]  4/5/24: [id number]    [facility]  3/1/24: [id number]   IMAGING (NEED IMAGES & REPORT)     MRI PACS [facility]  3/20/24   MAMMO PACS [facility]  3/13/24, 2/21/24, 11/10/23, 11/9/22, 4/5/18    [facility]  5/5/21, 5/4/20, 3/23/17, 2/18/16, 2/10/15   ULTRASOUND PACS [facility]  3/1/24, 2/21/24

## 2024-04-22 NOTE — TELEPHONE ENCOUNTER
Action April 22, 2024 4:40 PM ABT   Action Taken Slides from Kamini received and taken to 5th floor path lab for review.    03/01/24: [id number]

## 2024-04-24 ENCOUNTER — LAB REQUISITION (OUTPATIENT)
Dept: LAB | Facility: CLINIC | Age: 74
End: 2024-04-24
Payer: COMMERCIAL

## 2024-04-24 PROCEDURE — 88321 CONSLTJ&REPRT SLD PREP ELSWR: CPT | Mod: GC | Performed by: STUDENT IN AN ORGANIZED HEALTH CARE EDUCATION/TRAINING PROGRAM

## 2024-04-25 LAB
PATH REPORT.COMMENTS IMP SPEC: ABNORMAL
PATH REPORT.COMMENTS IMP SPEC: YES
PATH REPORT.FINAL DX SPEC: ABNORMAL
PATH REPORT.GROSS SPEC: ABNORMAL
PATH REPORT.MICROSCOPIC SPEC OTHER STN: ABNORMAL
PATH REPORT.RELEVANT HX SPEC: ABNORMAL
PATH REPORT.RELEVANT HX SPEC: ABNORMAL
PATH REPORT.SITE OF ORIGIN SPEC: ABNORMAL

## 2024-04-26 ENCOUNTER — PRE VISIT (OUTPATIENT)
Dept: RADIATION ONCOLOGY | Facility: HOSPITAL | Age: 74
End: 2024-04-26
Payer: COMMERCIAL

## 2024-04-27 NOTE — PROGRESS NOTES
Department of Radiation Oncology                   [place] [address] [place]  [address]  Office:  [phone]  Fax:  [phone]   Radiation Oncology Clinic  [address] [place] [address]  Phone:  [phone]  Fax:  [phone]     RE: Cortney Nagy : 1950   MRN: [id number] JOVON: 2024     OUTPATIENT VISIT NOTE       PROBLEM: Left sided breast cancer      was seen for initial consultation in the Dept of Radiation Oncology on 2024 at the request of Dr. Trian Lam    ANATOMIC STAGE: IIA  PATHOLOGIC PROGNOSTIC STAGE: (pathologic T2N0(i+)M0, Grade 2, ER+/NY+/HER2-) IA    HISTORY OF PRESENT ILLNESS: Sona Quiroz is a 73-year-old woman from [place] with a pT2N0(i+) invasive ductal carcinoma of the left breast status post left wire localized partial mastectomy, sentinel lymph node biopsy with rearrangement of tissue/transfer of tissue to close defect on 24. She presents today to discuss adjuvant radiation therapy options.    She initially palpated a mass in the left breast. Diagnostic mammogram on 24 demonstrated  heterogenous dense glandular tissue, Category C. Dense glandular tissue retroareolar left breast without significant change since the prior exm without mass or achitectural distortion. Ultrasound demonstrated an ill defined [id number] mm hypoechoic shadowing nodule at 3 o'clock position, 1 cm from the nipple.     She underwent ultrasound guided left breast core biopsy with clip placement on 3/1/24. Pathology demonstrated invasive ductal carcinoma.     US guided core biopsy of left breast 3:00, 1 cm from the nipple revealed invasive ductal carcinoma, Mallory grade II, no angiolymphatic invasion, ER 95% moderate, NY 53% moderate, HER2 - by IHC (0), Ki-67 16%; DCIS was also focally present.     Breast MRI on 3/20/24 demonstrated a lobulated mass measuring 40 x 35 x 18 mm in the lateral left breast at the 3  o'clock position, extending from subareolar location to mid depth. No suspicious adenopathy noted.     PET/CT on 3/20/24 demonstrated no distant metastases or associated adenopathy from the known left breast lesion.     She underwent a ultrasound-guided, wire-localized partial mastectomy with sentinel lymph node assessment of the left breast and axilla on 4/5/24 with Dr. Trina Lam. Intraoperatively, she noted that the margins were negative on gross examination (with the closest inferior margin having a free margin of at least 3 mm). With regards to the sentinel assessment, there were two main nodes that took up the radiotracer, and there were also smaller nodes with ICG seen on SpyPhi that were nearby.     Pathology demonstrated ([id number]) a [place] Grade 2 invasive ductal carcinoma (including single file pattern, which on review continued to ), measuring 35 mm, with associated Nuclear Grade 2 DCIS (cribiform and solid type), LVSI-positive. The invasive carcinoma involved the dermis by direct extension, with epidermis being uninvolved. There was no noted epidermal ulceration. Invasive carcinoma was 4 mm from the posterior margin, >5 mm from the anterior, sueprior, medial and lateral margins. The inferior margin was involved with invasive carcinoma. The DCIS margins were 5 mm from the nearest margin (inferior) and >5 mm from the remaining margins. There was classic type LCIS also noted in the specimen. There were fibrocystic changes noted including microcysts with apocrine metaplasia and sclerosing adenosis.     There were 6 total lymph nodes on sentinel assessment of the left axilla, with one node positive for isolated tumor cells, with the deposit measuring 0.08 mm, without extranodal extension identified.     She discussed re-excision versus mastectomy given the persistent inferior margin and opted for re-excision. Re-excision on 4/18/24 was negative.     She was seen in consultation with Dr. Roman  Bloom of Medical Oncology on 4/29/24. OncotypeDx has been sent.     Ms. Nagy is here today with her daughter in law. She continues to lose weight, and have poor appetite. She notes ongoing pain in the left breast and axilla. She states that Tylenol and Ibuprofen were not helping her, and is not taking pain medicine at present. She describes the pain as constant and deep, occasionally electric. No swelling in the left arm. Range of motion in the left arm is limited to pain, but is improving and she is able to get her arm overhead slowly.     MENARCHAL HISTORY:  Onset of Menarche: Age 14  Menopause: 55 years old  GP status: 3 children      PAST MEDICAL HISTORY:    Constipation  Diverticular disease  Hyperlipidemia  Anxiety  Colonic polyps  Retinal detachment  Hysteroscopy for PMB (benign)    CHEMOTHERAPY HISTORY: None     PAST RADIATION THERAPY HISTORY: None     HISTORY OF CONNECTIVE TISSUE DISORDERS: patient does report intermittent Raynaud's phenomenon of the bilateral hands. This can also spontaneously occur when not in the cold, per patient.     MEDICATIONS:   Calcium-magnesium-zinc  Coenzyme Q 10  Lutein 10  Omega-3 fatty acids  Paroxetine  Pravastatin  Vitamin D, cholecalciferol 2000 unit    Current Outpatient Medications   Medication Sig Dispense Refill    Calcium-Magnesium-Zinc 333-133-5 MG TABS per tablet Take 1 tablet by mouth daily       coenzyme Q10 100 mg capsule Take 100 mg by mouth daily       Lutein 10 MG TABS Take 10 mg by mouth daily       Omega-3 Fatty Acids (FISH OIL) 1200 MG capsule Take 1,200 mg by mouth daily      PARoxetine (PAXIL) 10 MG tablet Take 20 mg by mouth At Bedtime      pravastatin (PRAVACHOL) 10 MG tablet Take 10 mg by mouth At Bedtime       Vitamin D, Cholecalciferol, 50 MCG (2000 UT) CAPS Take 4,000 capsules by mouth daily         ALLERGIES: is allergic to clindamycin, aleve [naproxen], codeine, penicillins, propoxyphene n-acetaminophen [propoxyphene n-apap], and sulfa  (sulfonamide antibiotics) [sulfa antibiotics].    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 See HPI for details.     PHYSICAL EXAMINATION:    /53   Pulse 80   Wt 56.1 kg (123 lb 9.6 oz)   SpO2 95%   BMI 20.10 kg/m    Body mass index is 20.1 kg/m .   Gen: alert and oriented, no acute distress  HEENT: unremarkable   CV: well perfused  Resp: breathing comfortably on room air  Neuro: grossly intact  Pelvic: deferred    IMAGING:   Breast MRI 3/20/24        ASSESSMENT AND PLAN: In summary, this is a post-menopausal woman with a pathologic T2N0(i+) of the left breast with associated Grade 2 DCIS. Re-exicison was performed due to a persistent inferior margin, so now all margins both invasive and pre-malignant are negative for disease.     We recommend adjuvant radiotherapy to the whole breast with sequential boost to the lumpectomy scar. We discussed either hypofractionated or ultrahypofractionated (FAST FORWARD) therapy to the whole breast, discussing that the follow up data for the FAST-FORWARD regimen remains limited to 5 years.    Risks, benefits, logistics were described in detail. We discussed utilizing deep inspiratory breath hold technique for cardiac sparing. We reviewed the acute and late form side effects in detail: these include fatigue, skin and soft tissue reaction, wound healing/cosmetic outcomes, breast edema / pain, arm or shoulder pain, pneumonitis, cardiac toxicities, rib fracture, lymphedema, and secondary malignancy.     We will await the result of her  OncotypeDx score prior to initiating therapy. The patient signed consent in clinic today.       Thank you for allowing us to participate in this patient's care.  Please feel free to call with any questions or concerns.    The patient was seen and assessed by my attending, Dr. Cast, who agrees with the above assessment and plan.     Na Ervin MD PGY5  Department of Radiation Oncology  [phone] Clinic  [phone] Pager

## 2024-04-27 NOTE — PROGRESS NOTES
Department of Radiation Oncology                   [place] [address] [place]  [address]  Office:  [phone]  Fax:  [phone]   Radiation Oncology Clinic  [address] [place] [id number]  Phone:  [phone]  Fax:  [phone]     RE: Cortney Nagy : 1950   MRN: [id number] JOVON: 2024     OUTPATIENT VISIT NOTE       PROBLEM: Invasive ductal carcinoma of the left breast, s/p lumpectomy and SLN Bx, pT2N0(i+)(sn), grade 2, ER+, OR-, HER-     was seen for initial consultation in the Dept of Radiation Oncology on 2024 at the request of Dr. Trina Lam.     HISTORY OF PRESENT ILLNESS: Ms. Nagy is a 74 yo female with a newly diagnosed left breast cancer.     She presented with a  palpable mass in her left breast.      Her oncologic history is the followin2024: Diagnostic mammogram showed dense glandular tissue of the retroareolar left breast without definite mass or architectural distortion, no significant change to the prior mammogram on 11/10/2023.     US did show an ill-defined hypoechoic mass corresponding to palpable mass.     3/1/2024: US guided core biopsy of left breast 3:00, 1 cm from the nipple revealed invasive ductal carcinoma, [place] grade II, no angiolymphatic invasion, ER 95% moderate, OR 53% moderate, HER2 - by IHC (0), Ki-67 16%; DCIS was also focally present.     3/8/2024: Cortney established care with Dr. Lam. Further imaging was recommended. Additionally, a PET/CT was ordered due to weight loss.     3/20/2024: Breast MRI showed a 4.0 x 3.5 x 1.8 cm lateral left breast mass at 3:00, extending form subareolar to mid breast level. No suspicious adenopathy.     3/20/2024: PET/CT scan showed only the known left breast lesion with no adenopathy or distant metastases.     3/27/2024: Follow up with Dr. Lam. MRI showed a larger mass than initially seen on ultrasound. Nipple was also anticipated to be  "removed. She expressed an interest in reconstruction with Dr. Stallworth following her treatment.     4/5/2024: Cortney underwent wire-localized lumpectomy and SLN Bx with oncoplastic closure. Pathology revealed invasive ductal carcinoma, [place] grade 2, measuring 3.5 cm in greatest dimension, involving the nipple and dermis by direct extension.. LVSI was present. Associated DCIS was nuclear grade 2, cribriform and solid types, comprising < 10% of the tumor volume.   Margins -- focally involved at the inferior margin for invasive component, 4 mm posterior for invasive component; 5 mm inferior for DCIS.   One of 6 SLN had ITC of 0.08 mm without extracapsular extension.   Final stage pT2N0(i+)(sn)    4/12/2024: She followed up with Dr. Lam. Re-excision for margin vs. Mastectomy was discussed. She opted for re-excision.     4/18/2024: Reexcision for margin. Per report, no residual tumor was found.     4/29/2024: Cortney saw Dr. Abel Luis. An Oncotype Dx score was sent. Discussion of TC x 4 if coming back greater than 25.     Cortney is seen today for a discussion of adjuvant radiation therapy. She states that her left shoulder has been tight since the surgery. She has a \"lump\" there, which is quite uncomfortable. She also has some tenderness in the lumpectomy site, though denies fever or chills.     She has met with Dr. Stallworth for possible reconstruction of the left nipple and right breast reduction for symmetry. She states that she is not sure if she wants another surgery for cosmetic purpose. She is somewhat overwhelmed right now.         PAST MEDICAL HISTORY:    Past Medical History:   Diagnosis Date    Anemia     Anxiety     Breast cancer (H) 02/2024    Cerebral aneurysm, nonruptured     Depression     Fainting     Hyperlipemia     Mixed hyperlipidemia     Painful swelling of joint     PONV (postoperative nausea and vomiting)     Raynaud's disease without gangrene     Transient global amnesia       Past " Surgical History:   Procedure Laterality Date    COLONOSCOPY      DILATION AND CURETTAGE, OPERATIVE HYSTEROSCOPY, COMBINED N/A 2/29/2024    Procedure: DIAGNOSTIC HYSTEROSCOPY WITH BIOPSY OF ENDOMETRIUM;  Surgeon: Demi Daily MD;  Location: [facility] OR    EYE SURGERY      RETINA SURGERY,  left eye twice and right eye once    MASTECTOMY PARTIAL WITH SENTINEL NODE Left 4/5/2024    Procedure: wide local excision of left breast mass cancer, left lymphatic mapping, left sentinel lymph node biopsy transfer of tissue/advancement flap creation for closure of defect;  Surgeon: Trina Lam MD;  Location: [facility] OR        CHEMOTHERAPY HISTORY: None    PAST RADIATION THERAPY HISTORY: None    MEDICATIONS:    Current Outpatient Medications   Medication Sig Dispense Refill    Calcium-Magnesium-Zinc 333-133-5 MG TABS per tablet Take 1 tablet by mouth daily       coenzyme Q10 100 mg capsule Take 100 mg by mouth daily       Lutein 10 MG TABS Take 10 mg by mouth daily       Omega-3 Fatty Acids (FISH OIL) 1200 MG capsule Take 1,200 mg by mouth daily      PARoxetine (PAXIL) 10 MG tablet Take 20 mg by mouth At Bedtime      pravastatin (PRAVACHOL) 10 MG tablet Take 10 mg by mouth At Bedtime       Vitamin D, Cholecalciferol, 50 MCG (2000 UT) CAPS Take 4,000 capsules by mouth daily         ALLERGIES:  allergic to clindamycin, aleve [naproxen], codeine, penicillins, propoxyphene n-acetaminophen [propoxyphene n-apap], and sulfa (sulfonamide antibiotics) [sulfa antibiotics].    SOCIAL HISTORY:     Social History     Socioeconomic History    Marital status:      Spouse name: Not on file    Number of children: Not on file    Years of education: Not on file    Highest education level: Not on file   Occupational History    Not on file   Tobacco Use    Smoking status: Never    Smokeless tobacco: Never   Vaping Use    Vaping status: Never Used   Substance and Sexual Activity    Alcohol use: Not Currently      Comment: Occ    Drug use: Never    Sexual activity: Not on file   Other Topics Concern    Not on file   Social History Narrative    Not on file     Social Determinants of Health     Financial Resource Strain: Not on file   Food Insecurity: Not on file   Transportation Needs: Not on file   Physical Activity: Not on file   Stress: Not on file   Social Connections: Not on file   Interpersonal Safety: Not on file   Housing Stability: Not on file      Never smoker  1 glass of wine a week  Sales at [employer], 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     of metastatic lung cancer 15 months ago. They were  for 54 years.     Daughter-in-law Anahi   2 sons and 1 daughter.     Enjoys museum and music.       FAMILY HISTORY:    family history includes Cancer in her brother and father; Diabetes in her father; Heart Disease in her father and mother.  Father: prostate cancer  Brother: prostate GUERO seymour       REVIEW OF SYMPTOMS:  A full 14-point review of systems was performed. See HPI for details.     PHYSICAL EXAMINATION:    /53   Pulse 80   Wt 56.1 kg (123 lb 9.6 oz)   SpO2 95%   BMI 20.10 kg/m     Gen: alert and oriented, no acute distress  HEENT: unremarkable   CV: well perfused  Resp: breathing comfortably on room air  Neuro: grossly intact  Pelvic: deferred  Breasts: Left breast surgical scar covered with steri-strips. There is some erythema in the outer lower quadrant with tenderness on palpation. The left breast is smaller compared to the right.   Axilla: palpable seroma in the axilla, with cording.     IMAGING:      ASSESSMENT AND PLAN: In summary, Ms. Nagy is a 74 yo female with a newly diagnosed left breast cancer, s/p central lumpectomy with removal of the nipple areolar complex secondary to tumor being close to the nipple, along with SLN bx. She is 2 weeks s/p reexcision for margin.     We reviewed her imaging and pathologic findings. She has a 3.5 cm tumor with  LVSI and nipple involvement. One of 6 SLN had a 0.08 mm metastatic focus. Given these findings, omission of radiation is not recommended.     We then discussed adjuvant radiation therapy to the left breast to improve local control. I am inclined to include axilla level I/II in the tangents given finding of ITC in the SLN. Inclusion of the low axilla will not add to the treatment toxicities.     We then discussed fractionation options including the Brazilian regimen of 42.5 Gy in 16 fractions vs. FAST-FORWARD regimen of 26 Gy in 5 fractions. Both are reasonable options with the latter having a shorter follow up duration. A boost to the lumpectomy cavity (if identifiable) would be recommended due to risk factor of LVSI. If Brazilian regimen is used, I will boost the cavity with 10 Gy in 4 fractions. If she prefers the FAST-FORWARD regimen, I will boost with 5.2 Gy in 2 fractions.     Cortney has a somewhat difficult time deciding between the 2 fractionation schemes. Although she is currently leaning against reconstruction, she is not ruling it out completely. As such, I discussed my preference of using the moderately fractionated Brazilian regimen, which may result in a better cosmetic outcome in the setting of reconstructive surgery. She is in fact relieved not to have to make a decision herself.     Her Oncotype Dx score is pending. If coming back high risk, we will wait to bring her back for simulation after she completes adjuvant chemotherapy. Otherwise we will arrange a simulation in ~ 2 weeks.     Her exam today showed some erythema in the outer lower quadrant. I took some photos and added to the Media section. She is seeing Dr. Lam for a follow up this Friday for a post-op visit.     Thank you for allowing us to participate in this patient's care.  Please feel free to call with any questions or concerns.       Elza Cast M.D./Ph.D.  Radiation Oncologist   Department of Radiation Oncology  [facility]  [facility]  Phone: [phone]       I reviewed patient's chart, internal/external medical records, imaging studies (including actual images), labs and pathology reports.  I interviewed and counseled the patient face to face.  I additionally discussed the case with patient's referring physicians and care team.            Elza Cast MD

## 2024-04-28 NOTE — PROGRESS NOTES
[facility] Medical Oncology Note  Date of visit: April 29, 2024  New Outpatient Clinic Note        Assessment:     New diagnosis of Stage IA ([id number]) moderately differentiated invasive ductal breast cancer status postlumpectomy, and repeat resection to establish negative margins.  This is still breast conservation.  She will need adjuvant breast radiotherapy to complete local management.  In terms of systemic therapy this is an ER/FL positive tumor with a Ki-67 of 15.  It is not exactly clear to me whether or not this is a luminal a or luminal B tumor.  Corntey would be interested in chemotherapy, if it was indicated, so I will order Oncotype.  If it comes back less than 25, then there will be no obvious benefit to chemotherapy and that she should go right to adjuvant radiotherapy.  If the recurrence score is 25 or above, then we really should consider 4 cycles of adjuvant Taxotere and Cytoxan.  And, Cortney is open to chemotherapy if it gives her a higher chance of cure, which is why we are sending the genomic test.   At baseline she struggles with depression, so this would color future decisions regarding endocrine therapy.  I would be much more inclined to prescribe an aromatase inhibitor rather than tamoxifen.  Otherwise healthy and dynamic 73-year-old woman.    I had a long and involved conversation with Cortney and her daughter-in-law Anahi today in clinic.  Many questions were asked and hopefully answered to her satisfaction.    Plan:     Run the Oncotype on her tumor  Follow-up with Dr. Cast later this week to discuss adjuvant radiotherapy.  Regardless of her Oncotype, this will be something that would be recommended given the size of the tumor.  I will follow-up the results of the Oncotype.  I will give her a call in 2 weeks with the results.  If it is less than 25, that she should just proceed with adjuvant radiotherapy.  If it is 25 or above, then we should get her back to clinic to further  discuss adjuvant chemotherapy with docetaxel and cyclophosphamide.  Continue to follow-up with Dr. Trina Arvizu for postoperative management.          Abel Luis MD, MSc  Associate Professor of Medicine  [employer]  [address] [address]  [phone]    __________________________________________________________________    DIAGNOSIS     Stage IA (pT2N0(i+)MX) moderately differentiated invasive ductal breast cancer, diagnosed definitively at left sided lumpectomy and sentinel lymph node biopsy 4/5/2024.  The primary tumor was 3.5 cm with associated DCIS and [place] grade 2.  Lymphovascular invasion was positive.  The cancer involves the nipple and dermis by direct extension, though there is no epidermal ulceration.  Inferior margin was involved with less than 1 mm at the inferior inked margin..  There was also LCIS in the specimen.  1 out of 6 lymph nodes was involved with isolated tumor cells (0.08 mm) without extranodal extension.  The biology of the cancer was 95% positive for the estrogen receptor 53% positive for the progesterone receptor negative for HER2 by IHC with 0+ staining, with a JAMIE-67 of 16%.  Even at the time of her diagnosis, coincident mammogram did not show any obvious abnormality.      History of Present Illness:     11/10/2023: Has normal bilateral routine screening mammography.  BI-RADS-01 disease.  Sona then palpated a mass posterior to the areolar complex 3 months later.  Mammogram 2/21/2024 still showed no definitive mass or architectural distortion in the bilateral breast.  However ultrasound of the right breast did show an 11 x 10 x 0.8 cm shadowing mass concerning for malignancy.  3/1/2024: Biopsy of the breast shows a moderately differentiated invasive ductal breast cancer.  Follow-up MRI shows a 4.0 x 3.5 x 1.8 cm mass in the left breast that extends from the areolar complex towards the chest wall.   "There were no abnormal lymph nodes seen.  Lumpectomy and sentinel lymph node biopsy 2024.  Path report is as above in Diagnosis (#1).  Repeat resection was done 2024 with negative margins, per report.  Lakeisha is here with her daughter-in-law Anahi to discuss where we go from regarding management.      Interval history:   Cortney is here to establish care with this oncologist  She is with her daughter-in-law Anahi  She is very nervous about everything.  She has significant pain and tenderness at the site of sentinel lymph node biopsy.  She otherwise tolerated surgery pretty well.  She is pretty tearful today.  She misses her  Darrell very much.  That said, she tells me today she wants to do everything possible to give herself the highest chance of cure here.  She has a lot of grandchildren she wants to be around 4.  She denies cough or shortness of breath.  She denies focal neurologic complaint.  There are no urinary or GI symptoms.        Past Medical History:   Osteopenia  \"Transient Global Amnesia\"   Past Medical History:   Diagnosis Date    Anemia     Anxiety     Breast cancer (H) 2024    Cerebral aneurysm, nonruptured     Depression     Fainting     Hyperlipemia     Mixed hyperlipidemia     Painful swelling of joint     PONV (postoperative nausea and vomiting)     Raynaud's disease without gangrene     Transient global amnesia           Past Surgical History:    I have reviewed this patient's past surgical history       Social History:   Tobacco, ETOH, and rec drugs reviewed and as noted below with the following exceptions:  Sona grew up in [place] and graduated from [employer] in .  She and her  to be Darrell were engaged as seniors in high school.  All of her fellow students could not wait to see her engagement.  They were  for 54 years.  Unfortunately he  about a year and a half ago due to metastatic lung cancer.  He got his care at [place] [facility].  This is why she did not wish to go there for her own care.  She has 3 children, 202 and she is close with.  54-year-old and he is  to Anahi (who is here with her mother-in-law today).  There are 3 grandchildren their age 24, 21, and 17.  Is also some Rafael who is single and lives with his significant other in [place].  Daughter Willow is 50 but they are estranged.  There is 1 child there.  Even around the death of Darrell, Willow did not communicate with any of the family members.  She lives by [facility].  She likes to go to the [employer] as well as [employer].  When I ask her what I should know about her, she tells me that she would like to have a hug.  She does struggle with significant depression.          Family History:     Family History   Problem Relation Age of Onset    Heart Disease Mother     Cancer Father     Diabetes Father     Heart Disease Father     Cancer Brother             Medications:     Current Outpatient Medications   Medication Sig Dispense Refill    Calcium-Magnesium-Zinc 333-133-5 MG TABS per tablet Take 1 tablet by mouth daily       coenzyme Q10 100 mg capsule Take 100 mg by mouth daily       Lutein 10 MG TABS Take 10 mg by mouth daily       Omega-3 Fatty Acids (FISH OIL) 1200 MG capsule Take 1,200 mg by mouth daily      PARoxetine (PAXIL) 10 MG tablet Take 20 mg by mouth At Bedtime      pravastatin (PRAVACHOL) 10 MG tablet Take 10 mg by mouth At Bedtime       Vitamin D, Cholecalciferol, 50 MCG (2000 UT) CAPS Take 4,000 capsules by mouth daily                Physical Exam:   There were no vitals taken for this visit.    ECOG PS: 0  Constitutional: WDWN female in NAD, pleasant and appropriate  HEENT:  NC/AT, no icterus, OP clear, MMM  Skin: No jaundice nor ecchymoses  Lungs: CTAB, no w/r/r, nonlabored breathing  Cardiovascular: RRR, S1, S2, no m/r/g  Abdomen: +BS, soft, nontender, nondistended, no organomegaly nor masses  MSK/Extremities: Warm, well perfused. No  edema  LN: no cervical, supraclavicular, axillary, nor inguinal lymphadenopathy  Neurologic: alert, answering questions appropriately, moving all extremities spontaneously. CN 2-12 grossly intact.  Psych: appropriate affect  Breast: The left breast is status post central lumpectomy and sentinel lymph node biopsy.  I did not remove the Steri-Strips across the central aspect of the breast.  The parenchyma around it seems pretty heterogeneous with no concerning finding.  There is a roughly 1-2 cm seroma posterior to the sentinel lymph node surgery.  The right breast has no significant palpable abnormality.  There is no nipple or skin retraction in the right axilla is negative.    Data:       Final Diagnosis   A. LEFT breast, ultrasound guided/wire-localized partial mastectomy:  -INVASIVE BREAST CARCINOMA OF NO SPECIAL TYPE (INVASIVE DUCTAL CARCINOMA), including single file pattern, JUNG GRADE 2, size 35 mm  -Ductal carcinoma in situ (DCIS), nuclear grade 2, cribriform and solid type(s)  -DCIS is admixed with and adjacent to invasive carcinoma, and comprises <10% of the tumor volume  -Lymphovascular invasion present  -Invasive carcinoma involves the nipple  -Invasive carcinoma involves the dermis, by direct extension  -Epidermis is uninvolved by carcinoma  -No epidermal ulceration  -Inferior margin is involved by invasive carcinoma  -Invasive carcinoma is 4 mm from the posterior margin and > 5 mm from the anterior, superior, medial and lateral margins  -Margins are uninvolved by DCIS  -DCIS is 5 mm from the nearest (inferior) margin and > 5 mm from the remaining margins  -Lobular carcinoma in situ (LCIS), classic type  -Other findings: fibrocystic change (including microcysts with apocrine metaplasia) and sclerosing adenosis  -Calcifications associated with invasive carcinoma, DCIS, and benign acini and stroma  -Prior core biopsy site changes  -See comment  -See tumor synoptic below     B. Lymph node, LEFT  "axillary, sentinel, excision:  -Isolated tumor cells in one of six lymph nodes (ITC 1/6), size 0.08 mm  -No extranodal extension identified             Recent Labs   Lab Test 04/05/24  1224 08/23/23  1100 11/09/19  0741 11/07/19  1258   WBC 6.9  --   --  5.3   HGB 11.5* 12.0  --  12.2     --  346 374     Recent Labs   Lab Test 04/03/24  1138 02/27/24  1428 11/02/22  0942 11/02/22  0942 12/08/21  1411 09/16/20  1008    139  --  142 140 141   POTASSIUM 3.9 4.3  --  4.3 4.5 4.7   CHLORIDE 103 101  --  105 103 106   CO2 25 26  --  26 25 26   ANIONGAP 13 12   < > 11 12 9   BUN 13.5 15.4  --  16.3 12 12   CR 0.84 0.75  --  0.75 0.81 0.81   DANIELLA 10.0 9.5  --  9.8 9.7 9.9    < > = values in this interval not displayed.     No results for input(s): \"MAG\", \"PHOS\", \"LDH\", \"URIC\" in the last 72917 hours.  Recent Labs   Lab Test 11/02/22  0942 11/07/19  1258 02/15/16  1003   BILITOTAL 0.6 0.7  --    ALKPHOS 44 44*  --    ALT 13 13  --    AST 19 18 15   ALBUMIN 4.3 4.1  --      @LABRCNT(PSAtumormarker:7)    No results found for this or any previous visit (from the past 24 hour(s)).    Other Data           Labs, imaging and treatment plan reviewed with patient. All questions answered.        75 minutes spent on the date of the encounter doing chart review, review of outside records, review of test results, interpretation of tests, patient visit, documentation, discussion with other provider(s), and discussion with family           "

## 2024-04-29 ENCOUNTER — ONCOLOGY VISIT (OUTPATIENT)
Dept: ONCOLOGY | Facility: CLINIC | Age: 74
End: 2024-04-29
Attending: INTERNAL MEDICINE
Payer: COMMERCIAL

## 2024-04-29 VITALS
HEIGHT: 66 IN | SYSTOLIC BLOOD PRESSURE: 82 MMHG | TEMPERATURE: 98 F | WEIGHT: 121 LBS | DIASTOLIC BLOOD PRESSURE: 57 MMHG | RESPIRATION RATE: 16 BRPM | BODY MASS INDEX: 19.44 KG/M2 | HEART RATE: 83 BPM | OXYGEN SATURATION: 97 %

## 2024-04-29 DIAGNOSIS — Z17.0 MALIGNANT NEOPLASM INVOLVING BOTH NIPPLE AND AREOLA OF LEFT BREAST IN FEMALE, ESTROGEN RECEPTOR POSITIVE (H): Primary | ICD-10-CM

## 2024-04-29 DIAGNOSIS — C50.012 MALIGNANT NEOPLASM INVOLVING BOTH NIPPLE AND AREOLA OF LEFT BREAST IN FEMALE, ESTROGEN RECEPTOR POSITIVE (H): Primary | ICD-10-CM

## 2024-04-29 PROCEDURE — G0463 HOSPITAL OUTPT CLINIC VISIT: HCPCS | Performed by: INTERNAL MEDICINE

## 2024-04-29 PROCEDURE — 99417 PROLNG OP E/M EACH 15 MIN: CPT | Performed by: INTERNAL MEDICINE

## 2024-04-29 PROCEDURE — 99205 OFFICE O/P NEW HI 60 MIN: CPT | Performed by: INTERNAL MEDICINE

## 2024-04-29 RX ORDER — CHLORHEXIDINE GLUCONATE ORAL RINSE 1.2 MG/ML
SOLUTION DENTAL
COMMUNITY
Start: 2024-04-19 | End: 2024-09-05

## 2024-04-29 RX ORDER — CEFDINIR 300 MG/1
CAPSULE ORAL
COMMUNITY
Start: 2024-04-18 | End: 2024-05-01

## 2024-04-29 ASSESSMENT — PAIN SCALES - GENERAL: PAINLEVEL: MILD PAIN (3)

## 2024-04-29 NOTE — NURSING NOTE
"Oncology Rooming Note    April 29, 2024 12:42 PM   Cortney Nagy is a 73 year old female who presents for:    Chief Complaint   Patient presents with    Oncology Clinic Visit     Malignant neoplasm of unspecified site of left female breast      Initial Vitals: BP (!) 82/57   Pulse 83   Temp 98  F (36.7  C)   Resp 16   Ht 1.67 m (5' 5.75\")   Wt 54.9 kg (121 lb)   SpO2 97%   BMI 19.68 kg/m   Estimated body mass index is 19.68 kg/m  as calculated from the following:    Height as of this encounter: 1.67 m (5' 5.75\").    Weight as of this encounter: 54.9 kg (121 lb). Body surface area is 1.6 meters squared.  Mild Pain (3) Comment: Data Unavailable   No LMP recorded. Patient is postmenopausal.  Allergies reviewed: Yes  Medications reviewed: Yes    Medications: Medication refills not needed today.  Pharmacy name entered into [facility]: [id number] [place] [address]    Frailty Screening:   Is the patient here for a new oncology consult visit in cancer care? 1. Yes. Over the past month, have you experienced difficulty or required a caregiver to assist with:   1. Balance, walking or general mobility (including any falls)? NO  2. Completion of self-care tasks such as bathing, dressing, toileting, grooming/hygiene?  NO  3. Concentration or memory that affects your daily life?  YES       Clinical concerns: no other complaints      Brandon Neal"

## 2024-04-29 NOTE — LETTER
4/29/2024         RE: Cortney Nagy  [address] [place] [address]        Dear Colleague,    Thank you for referring your patient, Cortney Nagy, to the [facility]. Please see a copy of my visit note below.      [facility] Medical Oncology Note  Date of visit: April 29, 2024  New Outpatient Clinic Note        Assessment:     New diagnosis of Stage IA ([id number]) moderately differentiated invasive ductal breast cancer status postlumpectomy, and repeat resection to establish negative margins.  This is still breast conservation.  She will need adjuvant breast radiotherapy to complete local management.  In terms of systemic therapy this is an ER/AL positive tumor with a Ki-67 of 15.  It is not exactly clear to me whether or not this is a luminal a or luminal B tumor.  Cortney would be interested in chemotherapy, if it was indicated, so I will order Oncotype.  If it comes back less than 25, then there will be no obvious benefit to chemotherapy and that she should go right to adjuvant radiotherapy.  If the recurrence score is 25 or above, then we really should consider 4 cycles of adjuvant Taxotere and Cytoxan.  And, Cortney is open to chemotherapy if it gives her a higher chance of cure, which is why we are sending the genomic test.   At baseline she struggles with depression, so this would color future decisions regarding endocrine therapy.  I would be much more inclined to prescribe an aromatase inhibitor rather than tamoxifen.  Otherwise healthy and dynamic 73-year-old woman.    I had a long and involved conversation with Cortney and her daughter-in-law Anahi today in clinic.  Many questions were asked and hopefully answered to her satisfaction.    Plan:     Run the Oncotype on her tumor  Follow-up with Dr. Cast later this week to discuss adjuvant radiotherapy.  Regardless of her Oncotype, this will be something that would be recommended given the size of the  tumor.  I will follow-up the results of the Oncotype.  I will give her a call in 2 weeks with the results.  If it is less than 25, that she should just proceed with adjuvant radiotherapy.  If it is 25 or above, then we should get her back to clinic to further discuss adjuvant chemotherapy with docetaxel and cyclophosphamide.  Continue to follow-up with Dr. Trina Arvizu for postoperative management.          Abel Luis MD, MSc  Associate Professor of Medicine  [facility] [place] [address]  [phone]    __________________________________________________________________    DIAGNOSIS     Stage IA (pT2N0(i+)MX) moderately differentiated invasive ductal breast cancer, diagnosed definitively at left sided lumpectomy and sentinel lymph node biopsy 4/5/2024.  The primary tumor was 3.5 cm with associated DCIS and [place] grade 2.  Lymphovascular invasion was positive.  The cancer involves the nipple and dermis by direct extension, though there is no epidermal ulceration.  Inferior margin was involved with less than 1 mm at the inferior inked margin..  There was also LCIS in the specimen.  1 out of 6 lymph nodes was involved with isolated tumor cells (0.08 mm) without extranodal extension.  The biology of the cancer was 95% positive for the estrogen receptor 53% positive for the progesterone receptor negative for HER2 by IHC with 0+ staining, with a JAMIE-67 of 16%.  Even at the time of her diagnosis, coincident mammogram did not show any obvious abnormality.      History of Present Illness:     11/10/2023: Has normal bilateral routine screening mammography.  BI-RADS-01 disease.  Sona then palpated a mass posterior to the areolar complex 3 months later.  Mammogram 2/21/2024 still showed no definitive mass or architectural distortion in the bilateral breast.  However ultrasound of the right breast did show an 11 x 10 x 0.8 cm  "shadowing mass concerning for malignancy.  3/1/2024: Biopsy of the breast shows a moderately differentiated invasive ductal breast cancer.  Follow-up MRI shows a 4.0 x 3.5 x 1.8 cm mass in the left breast that extends from the areolar complex towards the chest wall.  There were no abnormal lymph nodes seen.  Lumpectomy and sentinel lymph node biopsy 4/5/2024.  Path report is as above in Diagnosis (#1).  Repeat resection was done 4/18/2024 with negative margins, per report.  Lakeisha is here with her daughter-in-law Anahi to discuss where we go from regarding management.      Interval history:   Cortney is here to establish care with this oncologist  She is with her daughter-in-law Anahi  She is very nervous about everything.  She has significant pain and tenderness at the site of sentinel lymph node biopsy.  She otherwise tolerated surgery pretty well.  She is pretty tearful today.  She misses her  Darrell very much.  That said, she tells me today she wants to do everything possible to give herself the highest chance of cure here.  She has a lot of grandchildren she wants to be around 4.  She denies cough or shortness of breath.  She denies focal neurologic complaint.  There are no urinary or GI symptoms.        Past Medical History:   Osteopenia  \"Transient Global Amnesia\"   Past Medical History:   Diagnosis Date    Anemia     Anxiety     Breast cancer (H) 02/2024    Cerebral aneurysm, nonruptured     Depression     Fainting     Hyperlipemia     Mixed hyperlipidemia     Painful swelling of joint     PONV (postoperative nausea and vomiting)     Raynaud's disease without gangrene     Transient global amnesia           Past Surgical History:    I have reviewed this patient's past surgical history       Social History:   Tobacco, ETOH, and rec drugs reviewed and as noted below with the following exceptions:  Sona grew up in [place] and graduated from [employer] in 1968.  She and her  to " be Darrell were engaged as seniors in high school.  All of her fellow students could not wait to see her engagement.  They were  for 54 years.  Unfortunately he  about a year and a half ago due to metastatic lung cancer.  He got his care at [facility].  This is why she did not wish to go there for her own care.  She has 3 children, 202 and she is close with.  54-year-old and he is  to Anahi (who is here with her mother-in-law today).  There are 3 grandchildren their age 24, 21, and 17.  Is also some Rafael who is single and lives with his significant other in [place].  Daughter Willow is 50 but they are estranged.  There is 1 child there.  Even around the death of Darrell, Willow did not communicate with any of the family members.  She lives by [facility].  She likes to go to the [place] as well as [place].  When I ask her what I should know about her, she tells me that she would like to have a hug.  She does struggle with significant depression.          Family History:     Family History   Problem Relation Age of Onset    Heart Disease Mother     Cancer Father     Diabetes Father     Heart Disease Father     Cancer Brother             Medications:     Current Outpatient Medications   Medication Sig Dispense Refill    Calcium-Magnesium-Zinc [phone] MG TABS per tablet Take 1 tablet by mouth daily       coenzyme Q10 100 mg capsule Take 100 mg by mouth daily       Lutein 10 MG TABS Take 10 mg by mouth daily       Omega-3 Fatty Acids (FISH OIL) 1200 MG capsule Take 1,200 mg by mouth daily      PARoxetine (PAXIL) 10 MG tablet Take 20 mg by mouth At Bedtime      pravastatin (PRAVACHOL) 10 MG tablet Take 10 mg by mouth At Bedtime       Vitamin D, Cholecalciferol, 50 MCG (2000) CAPS Take 4,000 capsules by mouth daily                Physical Exam:   There were no vitals taken for this visit.    ECOG PS: 0  Constitutional: WDWN female in NAD, pleasant and appropriate  HEENT:  NC/AT, no  icterus, OP clear, MMM  Skin: No jaundice nor ecchymoses  Lungs: CTAB, no w/r/r, nonlabored breathing  Cardiovascular: RRR, S1, S2, no m/r/g  Abdomen: +BS, soft, nontender, nondistended, no organomegaly nor masses  MSK/Extremities: Warm, well perfused. No edema  LN: no cervical, supraclavicular, axillary, nor inguinal lymphadenopathy  Neurologic: alert, answering questions appropriately, moving all extremities spontaneously. CN 2-12 grossly intact.  Psych: appropriate affect  Breast: The left breast is status post central lumpectomy and sentinel lymph node biopsy.  I did not remove the Steri-Strips across the central aspect of the breast.  The parenchyma around it seems pretty heterogeneous with no concerning finding.  There is a roughly 1-2 cm seroma posterior to the sentinel lymph node surgery.  The right breast has no significant palpable abnormality.  There is no nipple or skin retraction in the right axilla is negative.    Data:       Final Diagnosis   A. LEFT breast, ultrasound guided/wire-localized partial mastectomy:  -INVASIVE BREAST CARCINOMA OF NO SPECIAL TYPE (INVASIVE DUCTAL CARCINOMA), including single file pattern, JUNG GRADE 2, size 35 mm  -Ductal carcinoma in situ (DCIS), nuclear grade 2, cribriform and solid type(s)  -DCIS is admixed with and adjacent to invasive carcinoma, and comprises <10% of the tumor volume  -Lymphovascular invasion present  -Invasive carcinoma involves the nipple  -Invasive carcinoma involves the dermis, by direct extension  -Epidermis is uninvolved by carcinoma  -No epidermal ulceration  -Inferior margin is involved by invasive carcinoma  -Invasive carcinoma is 4 mm from the posterior margin and > 5 mm from the anterior, superior, medial and lateral margins  -Margins are uninvolved by DCIS  -DCIS is 5 mm from the nearest (inferior) margin and > 5 mm from the remaining margins  -Lobular carcinoma in situ (LCIS), classic type  -Other findings: fibrocystic change  "(including microcysts with apocrine metaplasia) and sclerosing adenosis  -Calcifications associated with invasive carcinoma, DCIS, and benign acini and stroma  -Prior core biopsy site changes  -See comment  -See tumor synoptic below     B. Lymph node, LEFT axillary, sentinel, excision:  -Isolated tumor cells in one of six lymph nodes (ITC 1/6), size 0.08 mm  -No extranodal extension identified             Recent Labs   Lab Test 04/05/24  1224 08/23/23  1100 11/09/19  0741 11/07/19  1258   WBC 6.9  --   --  5.3   HGB 11.5* 12.0  --  12.2     --  346 374     Recent Labs   Lab Test 04/03/24  1138 02/27/24  1428 11/02/22  0942 11/02/22  0942 12/08/21  1411 09/16/20  1008    139  --  142 140 141   POTASSIUM 3.9 4.3  --  4.3 4.5 4.7   CHLORIDE 103 101  --  105 103 106   CO2 25 26  --  26 25 26   ANIONGAP 13 12   < > 11 12 9   BUN 13.5 15.4  --  16.3 12 12   CR 0.84 0.75  --  0.75 0.81 0.81   DANIELLA 10.0 9.5  --  9.8 9.7 9.9    < > = values in this interval not displayed.     No results for input(s): \"MAG\", \"PHOS\", \"LDH\", \"URIC\" in the last 24859 hours.  Recent Labs   Lab Test 11/02/22  0942 11/07/19  1258 02/15/16  1003   BILITOTAL 0.6 0.7  --    ALKPHOS 44 44*  --    ALT 13 13  --    AST 19 18 15   ALBUMIN 4.3 4.1  --      @LABRCNT(PSAtumormarker:7)    No results found for this or any previous visit (from the past 24 hour(s)).    Other Data           Labs, imaging and treatment plan reviewed with patient. All questions answered.        75 minutes spent on the date of the encounter doing chart review, review of outside records, review of test results, interpretation of tests, patient visit, documentation, discussion with other provider(s), and discussion with family       "

## 2024-04-30 ENCOUNTER — PATIENT OUTREACH (OUTPATIENT)
Dept: ONCOLOGY | Facility: CLINIC | Age: 74
End: 2024-04-30
Payer: COMMERCIAL

## 2024-04-30 ENCOUNTER — TELEPHONE (OUTPATIENT)
Dept: OTOLARYNGOLOGY | Facility: CLINIC | Age: 74
End: 2024-04-30
Payer: COMMERCIAL

## 2024-04-30 NOTE — PROGRESS NOTES
[facility]: Cancer Care                                                                                          Oncotype sent    Barbara DE LA CRUZN, RN, OCN  Care Coordinator  [facility]

## 2024-04-30 NOTE — TELEPHONE ENCOUNTER
This patient will need to reschedule their appt with Dr. Delacruz on Mondayday, 7/15 to her next available openings.

## 2024-05-01 ENCOUNTER — OFFICE VISIT (OUTPATIENT)
Dept: RADIATION ONCOLOGY | Facility: CLINIC | Age: 74
End: 2024-05-01
Attending: RADIOLOGY
Payer: COMMERCIAL

## 2024-05-01 VITALS
OXYGEN SATURATION: 95 % | WEIGHT: 123.6 LBS | BODY MASS INDEX: 20.1 KG/M2 | SYSTOLIC BLOOD PRESSURE: 101 MMHG | DIASTOLIC BLOOD PRESSURE: 53 MMHG | HEART RATE: 80 BPM

## 2024-05-01 DIAGNOSIS — Z17.0 MALIGNANT NEOPLASM OF CENTRAL PORTION OF LEFT BREAST IN FEMALE, ESTROGEN RECEPTOR POSITIVE (H): Primary | ICD-10-CM

## 2024-05-01 DIAGNOSIS — C50.112 MALIGNANT NEOPLASM OF CENTRAL PORTION OF LEFT BREAST IN FEMALE, ESTROGEN RECEPTOR POSITIVE (H): Primary | ICD-10-CM

## 2024-05-01 PROCEDURE — G0463 HOSPITAL OUTPT CLINIC VISIT: HCPCS | Performed by: RADIOLOGY

## 2024-05-01 PROCEDURE — 99204 OFFICE O/P NEW MOD 45 MIN: CPT | Mod: GC | Performed by: RADIOLOGY

## 2024-05-01 ASSESSMENT — ENCOUNTER SYMPTOMS
DIZZINESS: 0
DEPRESSION: 1
HEMATURIA: 0
VOMITING: 0
DYSURIA: 0
INSOMNIA: 0
FALLS: 0
EYE PAIN: 0
DIARRHEA: 0
FEVER: 0
CONSTIPATION: 1
BLURRED VISION: 0
BLOOD IN STOOL: 0
WEIGHT LOSS: 0
NERVOUS/ANXIOUS: 0
NAUSEA: 0
BRUISES/BLEEDS EASILY: 0
HEADACHES: 0
NECK PAIN: 0
SEIZURES: 0
SORE THROAT: 0
SHORTNESS OF BREATH: 0
CHILLS: 0
COUGH: 0
DIAPHORESIS: 0
TINGLING: 0
DOUBLE VISION: 0
FREQUENCY: 0
BACK PAIN: 0

## 2024-05-01 NOTE — PROGRESS NOTES
"  HPI    INITIAL PATIENT ASSESSMENT    Diagnosis: Breast cancer, Lt Mastectomy 4/5/24, 4/18/24 re-excision    Prior radiation therapy: None    Prior chemotherapy: None    Prior hormonal therapy:Yes, unsure of details.    Pain Eval:  Slight discomfort in Lt ax. \"Feels like someone is pushing her\"    Psychosocial  Living arrangements: self  Fall Risk: independent   referral needs: Not needed    Advanced Directive: Yes - Location: at home  Implantable Cardiac Device? No    Onset of menarche: age 14  LMP: No LMP recorded. Patient is postmenopausal.  Onset of menopause: finished at 55  Abnormal vaginal bleeding/discharge: No  Are you pregnant? No  Reproductive note: 3 children    Nurse face-to-face time: Level 4:  15 min face to face time    Review of Systems   Constitutional:  Negative for chills, diaphoresis, fever, malaise/fatigue and weight loss.   HENT:  Negative for ear pain, nosebleeds and sore throat.    Eyes:  Negative for blurred vision, double vision and pain.   Respiratory:  Negative for cough and shortness of breath.    Cardiovascular:  Negative for chest pain and leg swelling.   Gastrointestinal:  Positive for constipation (taking stool softners). Negative for blood in stool, diarrhea, nausea and vomiting.   Genitourinary:  Negative for dysuria, frequency, hematuria and urgency.   Musculoskeletal:  Negative for back pain, falls, joint pain and neck pain.   Skin:  Negative for rash.   Neurological:  Negative for dizziness, tingling, seizures and headaches.   Endo/Heme/Allergies:  Does not bruise/bleed easily.   Psychiatric/Behavioral:  Positive for depression (Paxil) and suicidal ideas (Occ. seeing a therapist and family aware). The patient is not nervous/anxious and does not have insomnia.                "

## 2024-05-01 NOTE — LETTER
2024         RE: Cortney Nagy  [address] [place] [address]        Dear Colleague,    Thank you for referring your patient, Cortney Nagy, to the [facility] RADIATION ONCOLOGY. Please see a copy of my visit note below.    Department of Radiation Oncology                   [place] Mail Code 494  [address] [place]  [address]  Office:  [phone]  Fax:  [phone]   Radiation Oncology Clinic  [address] [place] [address]  Phone:  [phone]  Fax:  [phone]     RE: Cortney Nagy : 1950   MRN: [id number] JOVON: 2024     OUTPATIENT VISIT NOTE       PROBLEM: Left sided breast cancer      was seen for initial consultation in the Dept of Radiation Oncology on 2024 at the request of Dr. Trina Lam    ANATOMIC STAGE: IIA  PATHOLOGIC PROGNOSTIC STAGE: (pathologic T2N0(i+)M0, Grade 2, ER+/AK+/HER2-) IA    HISTORY OF PRESENT ILLNESS: Sona Quiroz is a 73-year-old woman from [place] with a pT2N0(i+) invasive ductal carcinoma of the left breast status post left wire localized partial mastectomy, sentinel lymph node biopsy with rearrangement of tissue/transfer of tissue to close defect on 24. She presents today to discuss adjuvant radiation therapy options.    She initially palpated a mass in the left breast. Diagnostic mammogram on 24 demonstrated  heterogenous dense glandular tissue, Category C. Dense glandular tissue retroareolar left breast without significant change since the prior exm without mass or achitectural distortion. Ultrasound demonstrated an ill defined 21w43l0 mm hypoechoic shadowing nodule at 3 o'clock position, 1 cm from the nipple.     She underwent ultrasound guided left breast core biopsy with clip placement on 3/1/24. Pathology demonstrated invasive ductal carcinoma.     US guided core biopsy of left breast 3:00, 1 cm from the nipple revealed invasive ductal carcinoma,  [place] grade II, no angiolymphatic invasion, ER 95% moderate, NC 53% moderate, HER2 - by IHC (0), Ki-67 16%; DCIS was also focally present.     Breast MRI on 3/20/24 demonstrated a lobulated mass measuring 40 x 35 x 18 mm in the lateral left breast at the 3 o'clock position, extending from subareolar location to mid depth. No suspicious adenopathy noted.     PET/CT on 3/20/24 demonstrated no distant metastases or associated adenopathy from the known left breast lesion.     She underwent a ultrasound-guided, wire-localized partial mastectomy with sentinel lymph node assessment of the left breast and axilla on 4/5/24 with Dr. Trina Lam. Intraoperatively, she noted that the margins were negative on gross examination (with the closest inferior margin having a free margin of at least 3 mm). With regards to the sentinel assessment, there were two main nodes that took up the radiotracer, and there were also smaller nodes with ICG seen on SpyPhi that were nearby.     Pathology demonstrated ([id number]) a [place] Grade 2 invasive ductal carcinoma (including single file pattern, which on review continued to ), measuring 35 mm, with associated Nuclear Grade 2 DCIS (cribiform and solid type), LVSI-positive. The invasive carcinoma involved the dermis by direct extension, with epidermis being uninvolved. There was no noted epidermal ulceration. Invasive carcinoma was 4 mm from the posterior margin, >5 mm from the anterior, sueprior, medial and lateral margins. The inferior margin was involved with invasive carcinoma. The DCIS margins were 5 mm from the nearest margin (inferior) and >5 mm from the remaining margins. There was classic type LCIS also noted in the specimen. There were fibrocystic changes noted including microcysts with apocrine metaplasia and sclerosing adenosis.     There were 6 total lymph nodes on sentinel assessment of the left axilla, with one node positive for isolated tumor cells, with the  deposit measuring 0.08 mm, without extranodal extension identified.     She discussed re-excision versus mastectomy given the persistent inferior margin and opted for re-excision. Re-excision on 4/18/24 was negative.     She was seen in consultation with Dr. Abel Luis of Medical Oncology on 4/29/24. OncotypeDx has been sent.     Ms. Nagy is here today with her daughter in law. She continues to lose weight, and have poor appetite. She notes ongoing pain in the left breast and axilla. She states that Tylenol and Ibuprofen were not helping her, and is not taking pain medicine at present. She describes the pain as constant and deep, occasionally electric. No swelling in the left arm. Range of motion in the left arm is limited to pain, but is improving and she is able to get her arm overhead slowly.     MENARCHAL HISTORY:  Onset of Menarche: Age 14  Menopause: 55 years old  GP status: 3 children      PAST MEDICAL HISTORY:    Constipation  Diverticular disease  Hyperlipidemia  Anxiety  Colonic polyps  Retinal detachment  Hysteroscopy for PMB (benign)    CHEMOTHERAPY HISTORY: None     PAST RADIATION THERAPY HISTORY: None     HISTORY OF CONNECTIVE TISSUE DISORDERS: patient does report intermittent Raynaud's phenomenon of the bilateral hands. This can also spontaneously occur when not in the cold, per patient.     MEDICATIONS:   Calcium-magnesium-zinc  Coenzyme Q 10  Lutein 10  Omega-3 fatty acids  Paroxetine  Pravastatin  Vitamin D, cholecalciferol 2000 unit    Current Outpatient Medications   Medication Sig Dispense Refill     Calcium-Magnesium-Zinc 333-133-5 MG TABS per tablet Take 1 tablet by mouth daily        coenzyme Q10 100 mg capsule Take 100 mg by mouth daily        Lutein 10 MG TABS Take 10 mg by mouth daily        Omega-3 Fatty Acids (FISH OIL) 1200 MG capsule Take 1,200 mg by mouth daily       PARoxetine (PAXIL) 10 MG tablet Take 20 mg by mouth At Bedtime       pravastatin (PRAVACHOL) 10 MG tablet Take 10  mg by mouth At Bedtime        Vitamin D, Cholecalciferol, 50 MCG (2000 UT) CAPS Take 4,000 capsules by mouth daily         ALLERGIES:  allergic to clindamycin, aleve [naproxen], codeine, penicillins, propoxyphene n-acetaminophen [propoxyphene n-apap], and sulfa (sulfonamide antibiotics) [sulfa antibiotics].    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 See HPI for details.     PHYSICAL EXAMINATION:    /53   Pulse 80   Wt 56.1 kg (123 lb 9.6 oz)   SpO2 95%   BMI 20.10 kg/m    Body mass index is 20.1 kg/m .   Gen: alert and oriented, no acute distress  HEENT: unremarkable   CV: well perfused  Resp: breathing comfortably on room air  Neuro: grossly intact  Pelvic: deferred    IMAGING:   Breast MRI 3/20/24        ASSESSMENT AND PLAN: In summary, this is a post-menopausal woman with a pathologic T2N0(i+) of the left breast with associated Grade 2 DCIS. Re-exicison was performed due to a persistent inferior margin, so now all margins both invasive and pre-malignant are negative for disease.     We recommend adjuvant radiotherapy to the whole breast with sequential boost to the lumpectomy scar. We discussed either hypofractionated or ultrahypofractionated (FAST FORWARD) therapy to the whole breast, discussing that the follow up data for the FAST-FORWARD regimen remains limited to 5 years.    Risks, benefits, logistics were described in detail. We discussed utilizing deep inspiratory breath hold technique for cardiac sparing. We reviewed the acute and late form  side effects in detail: these include fatigue, skin and soft tissue reaction, wound healing/cosmetic outcomes, breast edema / pain, arm or shoulder pain, pneumonitis, cardiac toxicities, rib fracture, lymphedema, and secondary malignancy.     We will await the result of her OncotypeDx score prior to initiating therapy. The patient signed consent in clinic today.       Thank you for allowing us to participate in this patient's care.  Please feel free to call with any questions or concerns.    The patient was seen and assessed by my attending, Dr. Cast, who agrees with the above assessment and plan.     Na Ervin MD PGY5  Department of Radiation Oncology  [phone] Clinic  [phone] Pager             Department of Radiation Oncology                   [place] Mail Code [address]  [address] [place] [address]  Office:  [phone]  Fax:  [phone]   Radiation Oncology Clinic  [address] [place] [id number]  Phone:  [phone]  Fax:  [phone]     RE: Cortney Nagy : 1950   MRN: [id number] JOVON: 2024     OUTPATIENT VISIT NOTE       PROBLEM: Invasive ductal carcinoma of the left breast, s/p lumpectomy and SLN Bx, pT2N0(i+)(sn), grade 2, ER+, NM-, HER-     was seen for initial consultation in the Dept of Radiation Oncology on 2024 at the request of Dr. Trina Lam.     HISTORY OF PRESENT ILLNESS: Ms. Nagy is a 74 yo female with a newly diagnosed left breast cancer.     She presented with a  palpable mass in her left breast.      Her oncologic history is the followin2024: Diagnostic mammogram showed dense glandular tissue of the retroareolar left breast without definite mass or architectural distortion, no significant change to the prior mammogram on 11/10/2023.     US did show an ill-defined hypoechoic mass corresponding to palpable mass.     3/1/2024: US guided core biopsy of left breast 3:00, 1 cm from the nipple revealed  "invasive ductal carcinoma, [place] grade II, no angiolymphatic invasion, ER 95% moderate, OR 53% moderate, HER2 - by IHC (0), Ki-67 16%; DCIS was also focally present.     3/8/2024: Cortney established care with Dr. Lam. Further imaging was recommended. Additionally, a PET/CT was ordered due to weight loss.     3/20/2024: Breast MRI showed a 4.0 x 3.5 x 1.8 cm lateral left breast mass at 3:00, extending form subareolar to mid breast level. No suspicious adenopathy.     3/20/2024: PET/CT scan showed only the known left breast lesion with no adenopathy or distant metastases.     3/27/2024: Follow up with Dr. Lam. MRI showed a larger mass than initially seen on ultrasound. Nipple was also anticipated to be removed. She expressed an interest in reconstruction with Dr. Stallworth following her treatment.     4/5/2024: Cortney underwent wire-localized lumpectomy and SLN Bx with oncoplastic closure. Pathology revealed invasive ductal carcinoma, [place] grade 2, measuring 3.5 cm in greatest dimension, involving the nipple and dermis by direct extension.. LVSI was present. Associated DCIS was nuclear grade 2, cribriform and solid types, comprising < 10% of the tumor volume.   Margins -- focally involved at the inferior margin for invasive component, 4 mm posterior for invasive component; 5 mm inferior for DCIS.   One of 6 SLN had ITC of 0.08 mm without extracapsular extension.   Final stage pT2N0(i+)(sn)    4/12/2024: She followed up with Dr. Lam. Re-excision for margin vs. Mastectomy was discussed. She opted for re-excision.     4/18/2024: Reexcision for margin. Per report, no residual tumor was found.     4/29/2024: Cortney saw Dr. Abel Luis. An Oncotype Dx score was sent. Discussion of TC x 4 if coming back greater than 25.     Cortney is seen today for a discussion of adjuvant radiation therapy. She states that her left shoulder has been tight since the surgery. She has a \"lump\" there, which is quite " uncomfortable. She also has some tenderness in the lumpectomy site, though denies fever or chills.     She has met with Dr. Stallworth for possible reconstruction of the left nipple and right breast reduction for symmetry. She states that she is not sure if she wants another surgery for cosmetic purpose. She is somewhat overwhelmed right now.         PAST MEDICAL HISTORY:    Past Medical History:   Diagnosis Date     Anemia      Anxiety      Breast cancer (H) 02/2024     Cerebral aneurysm, nonruptured      Depression      Fainting      Hyperlipemia      Mixed hyperlipidemia      Painful swelling of joint      PONV (postoperative nausea and vomiting)      Raynaud's disease without gangrene      Transient global amnesia       Past Surgical History:   Procedure Laterality Date     COLONOSCOPY       DILATION AND CURETTAGE, OPERATIVE HYSTEROSCOPY, COMBINED N/A 2/29/2024    Procedure: DIAGNOSTIC HYSTEROSCOPY WITH BIOPSY OF ENDOMETRIUM;  Surgeon: Demi Daily MD;  Location: [facility] OR     EYE SURGERY      RETINA SURGERY,  left eye twice and right eye once     MASTECTOMY PARTIAL WITH SENTINEL NODE Left 4/5/2024    Procedure: wide local excision of left breast mass cancer, left lymphatic mapping, left sentinel lymph node biopsy transfer of tissue/advancement flap creation for closure of defect;  Surgeon: Trina Lam MD;  Location: [facility] OR        CHEMOTHERAPY HISTORY: None    PAST RADIATION THERAPY HISTORY: None    MEDICATIONS:    Current Outpatient Medications   Medication Sig Dispense Refill     Calcium-Magnesium-Zinc 333-133-5 MG TABS per tablet Take 1 tablet by mouth daily        coenzyme Q10 100 mg capsule Take 100 mg by mouth daily        Lutein 10 MG TABS Take 10 mg by mouth daily        Omega-3 Fatty Acids (FISH OIL) 1200 MG capsule Take 1,200 mg by mouth daily       PARoxetine (PAXIL) 10 MG tablet Take 20 mg by mouth At Bedtime       pravastatin (PRAVACHOL) 10 MG tablet Take 10 mg by mouth At  Bedtime        Vitamin D, Cholecalciferol, 50 MCG ( UT) CAPS Take 4,000 capsules by mouth daily         ALLERGIES:  allergic to clindamycin, aleve [naproxen], codeine, penicillins, propoxyphene n-acetaminophen [propoxyphene n-apap], and sulfa (sulfonamide antibiotics) [sulfa antibiotics].    SOCIAL HISTORY:     Social History     Socioeconomic History     Marital status:      Spouse name: Not on file     Number of children: Not on file     Years of education: Not on file     Highest education level: Not on file   Occupational History     Not on file   Tobacco Use     Smoking status: Never     Smokeless tobacco: Never   Vaping Use     Vaping status: Never Used   Substance and Sexual Activity     Alcohol use: Not Currently     Comment: Occ     Drug use: Never     Sexual activity: Not on file   Other Topics Concern     Not on file   Social History Narrative     Not on file     Social Determinants of Health     Financial Resource Strain: Not on file   Food Insecurity: Not on file   Transportation Needs: Not on file   Physical Activity: Not on file   Stress: Not on file   Social Connections: Not on file   Interpersonal Safety: Not on file   Housing Stability: Not on file      Never smoker  1 glass of wine a week  Sales at [employer], 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     of metastatic lung cancer 15 months ago. They were  for 54 years.     Daughter-in-law Anahi   2 sons and 1 daughter.     Enjoys museum and music.       FAMILY HISTORY:    family history includes Cancer in her brother and father; Diabetes in her father; Heart Disease in her father and mother.  Father: prostate cancer  Brother: prostate caner, NHL       REVIEW OF SYMPTOMS:  A full 14-point review of systems was performed. See HPI for details.     PHYSICAL EXAMINATION:    /53   Pulse 80   Wt 56.1 kg (123 lb 9.6 oz)   SpO2 95%   BMI 20.10 kg/m     Gen: alert and oriented, no acute  distress  HEENT: unremarkable   CV: well perfused  Resp: breathing comfortably on room air  Neuro: grossly intact  Pelvic: deferred  Breasts: Left breast surgical scar covered with steri-strips. There is some erythema in the outer lower quadrant with tenderness on palpation. The left breast is smaller compared to the right.   Axilla: palpable seroma in the axilla, with cording.     IMAGING:      ASSESSMENT AND PLAN: In summary, Ms. Nagy is a 74 yo female with a newly diagnosed left breast cancer, s/p central lumpectomy with removal of the nipple areolar complex secondary to tumor being close to the nipple, along with SLN bx. She is 2 weeks s/p reexcision for margin.     We reviewed her imaging and pathologic findings. She has a 3.5 cm tumor with LVSI and nipple involvement. One of 6 SLN had a 0.08 mm metastatic focus. Given these findings, omission of radiation is not recommended.     We then discussed adjuvant radiation therapy to the left breast to improve local control. I am inclined to include axilla level I/II in the tangents given finding of ITC in the SLN. Inclusion of the low axilla will not add to the treatment toxicities.     We then discussed fractionation options including the Portuguese regimen of 42.5 Gy in 16 fractions vs. FAST-FORWARD regimen of 26 Gy in 5 fractions. Both are reasonable options with the latter having a shorter follow up duration. A boost to the lumpectomy cavity (if identifiable) would be recommended due to risk factor of LVSI. If Portuguese regimen is used, I will boost the cavity with 10 Gy in 4 fractions. If she prefers the FAST-FORWARD regimen, I will boost with 5.2 Gy in 2 fractions.     Cortney has a somewhat difficult time deciding between the 2 fractionation schemes. Although she is currently leaning against reconstruction, she is not ruling it out completely. As such, I discussed my preference of using the moderately fractionated Portuguese regimen, which may result in a better  "cosmetic outcome in the setting of reconstructive surgery. She is in fact relieved not to have to make a decision herself.     Her Oncotype Dx score is pending. If coming back high risk, we will wait to bring her back for simulation after she completes adjuvant chemotherapy. Otherwise we will arrange a simulation in ~ 2 weeks.     Her exam today showed some erythema in the outer lower quadrant. I took some photos and added to the Media section. She is seeing Dr. Lam for a follow up this Friday for a post-op visit.     Thank you for allowing us to participate in this patient's care.  Please feel free to call with any questions or concerns.       Elza Cast M.D./Ph.D.  Radiation Oncologist   Department of Radiation Oncology  [facility]  Phone: [phone]       I reviewed patient's chart, internal/external medical records, imaging studies (including actual images), labs and pathology reports.  I interviewed and counseled the patient face to face.  I additionally discussed the case with patient's referring physicians and care team.            Elza Cast MD          HPI    INITIAL PATIENT ASSESSMENT    Diagnosis: Breast cancer, Lt Mastectomy 4/5/24, 4/18/24 re-excision    Prior radiation therapy: None    Prior chemotherapy: None    Prior hormonal therapy:Yes, unsure of details.    Pain Eval:  Slight discomfort in Lt ax. \"Feels like someone is pushing her\"    Psychosocial  Living arrangements: self  Fall Risk: independent   referral needs: Not needed    Advanced Directive: Yes - Location: at home  Implantable Cardiac Device? No    Onset of menarche: age 14  LMP: No LMP recorded. Patient is postmenopausal.  Onset of menopause: finished at 55  Abnormal vaginal bleeding/discharge: No  Are you pregnant? No  Reproductive note: 3 children    Nurse face-to-face time: Level 4:  15 min face to face time    Review of Systems   Constitutional:  Negative for chills, " diaphoresis, fever, malaise/fatigue and weight loss.   HENT:  Negative for ear pain, nosebleeds and sore throat.    Eyes:  Negative for blurred vision, double vision and pain.   Respiratory:  Negative for cough and shortness of breath.    Cardiovascular:  Negative for chest pain and leg swelling.   Gastrointestinal:  Positive for constipation (taking stool softners). Negative for blood in stool, diarrhea, nausea and vomiting.   Genitourinary:  Negative for dysuria, frequency, hematuria and urgency.   Musculoskeletal:  Negative for back pain, falls, joint pain and neck pain.   Skin:  Negative for rash.   Neurological:  Negative for dizziness, tingling, seizures and headaches.   Endo/Heme/Allergies:  Does not bruise/bleed easily.   Psychiatric/Behavioral:  Positive for depression (Paxil) and suicidal ideas (Occ. seeing a therapist and family aware). The patient is not nervous/anxious and does not have insomnia.                  Again, thank you for allowing me to participate in the care of your patient.        Sincerely,        Elza Cast MD

## 2024-05-07 ENCOUNTER — TRANSFERRED RECORDS (OUTPATIENT)
Dept: HEALTH INFORMATION MANAGEMENT | Facility: CLINIC | Age: 74
End: 2024-05-07
Payer: COMMERCIAL

## 2024-05-23 ENCOUNTER — OFFICE VISIT (OUTPATIENT)
Dept: RADIATION ONCOLOGY | Facility: CLINIC | Age: 74
End: 2024-05-23
Attending: RADIOLOGY
Payer: COMMERCIAL

## 2024-05-23 DIAGNOSIS — Z17.0 MALIGNANT NEOPLASM OF CENTRAL PORTION OF LEFT BREAST IN FEMALE, ESTROGEN RECEPTOR POSITIVE (H): Primary | ICD-10-CM

## 2024-05-23 DIAGNOSIS — C50.112 MALIGNANT NEOPLASM OF CENTRAL PORTION OF LEFT BREAST IN FEMALE, ESTROGEN RECEPTOR POSITIVE (H): Primary | ICD-10-CM

## 2024-05-23 PROCEDURE — 77334 RADIATION TREATMENT AID(S): CPT | Performed by: RADIOLOGY

## 2024-05-23 PROCEDURE — 77290 THER RAD SIMULAJ FIELD CPLX: CPT | Mod: 26 | Performed by: RADIOLOGY

## 2024-05-23 PROCEDURE — 77334 RADIATION TREATMENT AID(S): CPT | Mod: 26 | Performed by: RADIOLOGY

## 2024-05-23 PROCEDURE — 77290 THER RAD SIMULAJ FIELD CPLX: CPT | Performed by: RADIOLOGY

## 2024-05-23 NOTE — PROGRESS NOTES
Radiation Therapy Patient Education    Person involved with teaching: Patient    Patient educational needs for self management of treatment-related side effects assessment completed.  [facility] Patient Ed tab contains Patient Learning Assessment    Education Materials Given  Sim pamphlet and schedule    Educational Topics Discussed  Side effects expected, Pain management, Activity, Nutrition and weight loss, and When to call MD/RN    Response To Teaching  Verbalizes understanding    Referrals sent: None    Chemotherapy?  No

## 2024-05-23 NOTE — LETTER
5/23/2024         RE: Cortney Nagy  [address]  [place] [address]        Dear Colleague,    Thank you for referring your patient, Cortney Nagy, to the [facility] RADIATION ONCOLOGY. Please see a copy of my visit note below.    Radiation Therapy Patient Education    Person involved with teaching: Patient    Patient educational needs for self management of treatment-related side effects assessment completed.  [facility] Patient Ed tab contains Patient Learning Assessment    Education Materials Given  Sim pamphlet and schedule    Educational Topics Discussed  Side effects expected, Pain management, Activity, Nutrition and weight loss, and When to call MD/RN    Response To Teaching  Verbalizes understanding    Referrals sent: None    Chemotherapy?  No         Again, thank you for allowing me to participate in the care of your patient.        Sincerely,        Elza Cast MD

## 2024-05-31 ENCOUNTER — APPOINTMENT (OUTPATIENT)
Dept: RADIATION ONCOLOGY | Facility: CLINIC | Age: 74
End: 2024-05-31
Attending: RADIOLOGY
Payer: COMMERCIAL

## 2024-05-31 PROCEDURE — 77300 RADIATION THERAPY DOSE PLAN: CPT | Performed by: RADIOLOGY

## 2024-05-31 PROCEDURE — 77334 RADIATION TREATMENT AID(S): CPT | Mod: 26 | Performed by: RADIOLOGY

## 2024-05-31 PROCEDURE — 77334 RADIATION TREATMENT AID(S): CPT | Performed by: RADIOLOGY

## 2024-05-31 PROCEDURE — 77280 THER RAD SIMULAJ FIELD SMPL: CPT | Performed by: RADIOLOGY

## 2024-05-31 PROCEDURE — 77300 RADIATION THERAPY DOSE PLAN: CPT | Mod: 26 | Performed by: RADIOLOGY

## 2024-05-31 PROCEDURE — 77295 3-D RADIOTHERAPY PLAN: CPT | Performed by: RADIOLOGY

## 2024-05-31 PROCEDURE — 77293 RESPIRATOR MOTION MGMT SIMUL: CPT | Mod: 26 | Performed by: RADIOLOGY

## 2024-05-31 PROCEDURE — 77293 RESPIRATOR MOTION MGMT SIMUL: CPT | Performed by: RADIOLOGY

## 2024-05-31 PROCEDURE — 77295 3-D RADIOTHERAPY PLAN: CPT | Mod: 26 | Performed by: RADIOLOGY

## 2024-05-31 PROCEDURE — 77280 THER RAD SIMULAJ FIELD SMPL: CPT | Mod: 26 | Performed by: RADIOLOGY

## 2024-06-03 ENCOUNTER — APPOINTMENT (OUTPATIENT)
Dept: RADIATION ONCOLOGY | Facility: CLINIC | Age: 74
End: 2024-06-03
Attending: RADIOLOGY
Payer: COMMERCIAL

## 2024-06-03 VITALS
OXYGEN SATURATION: 100 % | HEART RATE: 65 BPM | BODY MASS INDEX: 20.01 KG/M2 | DIASTOLIC BLOOD PRESSURE: 43 MMHG | WEIGHT: 123 LBS | SYSTOLIC BLOOD PRESSURE: 99 MMHG

## 2024-06-03 DIAGNOSIS — C50.112 MALIGNANT NEOPLASM OF CENTRAL PORTION OF LEFT BREAST IN FEMALE, ESTROGEN RECEPTOR POSITIVE (H): Primary | ICD-10-CM

## 2024-06-03 DIAGNOSIS — Z17.0 MALIGNANT NEOPLASM OF CENTRAL PORTION OF LEFT BREAST IN FEMALE, ESTROGEN RECEPTOR POSITIVE (H): Primary | ICD-10-CM

## 2024-06-03 PROCEDURE — G6002 STEREOSCOPIC X-RAY GUIDANCE: HCPCS | Mod: 26 | Performed by: RADIOLOGY

## 2024-06-03 PROCEDURE — 77387 GUIDANCE FOR RADJ TX DLVR: CPT | Performed by: RADIOLOGY

## 2024-06-03 PROCEDURE — 77412 RADIATION TX DELIVERY LVL 3: CPT | Performed by: RADIOLOGY

## 2024-06-03 NOTE — LETTER
6/3/2024         RE: Cortney Nagy  [address]  [place] [address]        Dear Colleague,    Thank you for referring your patient, Cortney Nagy, to the [facility] RADIATION ONCOLOGY. Please see a copy of my visit note below.    [facility] PHYSICIANS  SPECIALIZING IN BREAKTHROUGHS  Radiation Oncology    On Treatment Visit Note      Cortney Nagy      Date: 6/3/2024   MRN: [id number]   : 1950  Diagnosis: Breast cancer      Reason for Visit:  On Radiation Treatment Visit     Treatment Summary to Date  Treatment Site: Lt breast and Ax Current Dose: 265/5240 cGy Fractions:       Chemotherapy  Chemo concurrent with radx?: No    ED Visit/Hosiptal Admission: None    Treatment Breaks: No      Subjective:   Cortney is here for her first on-treatment visit. She tolerated treatment well. She has had a recent visit for drainage by Dr. Lam and only 1 ml was drained from her seroma. She is not noting pain or erythema any longer. She was prescribed 3 more days of Cefdinir.     Nursing ROS:   Nutrition Alteration  Diet Type: Patient's Preference  Skin  Skin Intervention: Will start aquaphor        Cardiovascular  Respiratory effort: 1 - Normal - without distress        Psychosocial  Psychosocial Note: Feeling well  Pain Assessment  0-10 Pain Scale: 0      Objective:   BP 99/43   Pulse 65   Wt 55.8 kg (123 lb)   SpO2 100%   BMI 20.01 kg/m    Gen: Appears well, in no acute distress  Skin: No erythema    Labs:  CBC RESULTS:   Recent Labs   Lab Test 24  1224   WBC 6.9   RBC 3.90   HGB 11.5*   HCT 36.7   MCV 94   MCH 29.5   MCHC 31.3*   RDW 14.1        ELECTROLYTES:  Recent Labs   Lab Test 24  1138      POTASSIUM 3.9   CHLORIDE 103   DANIELLA 10.0   CO2 25   BUN 13.5   CR 0.84   GLC 99       Assessment:    Tolerating radiation therapy well.  All questions and concerns addressed.    Toxicities:  Pain: Grade 0: No toxicity  Dermatitis: Grade 0: No  toxicity    Plan:   Continue current therapy.    Skin care discussed.       Mosaiq chart and setup information reviewed  Ports checked    Medication Review  Medication changes: No changes per pt    Educational Topic Discussed  Education Instructions: reviewed      The patient was seen and assessed by my attending, Dr. Cast, who agrees with the above assessment and plan.     Na Ervin MD PGY5  Department of Radiation Oncology  [phone] Clinic  [phone] Pager      I saw and examined the patient with the resident.  I have reviewed and edited the resident's note and agree with the plan of care.               Elza Cast MD       Again, thank you for allowing me to participate in the care of your patient.        Sincerely,        Elza Cast MD

## 2024-06-03 NOTE — PROGRESS NOTES
[facility] PHYSICIANS  SPECIALIZING IN BREAKTHROUGHS  Radiation Oncology    On Treatment Visit Note      Cortney Nagy      Date: 6/3/2024   MRN: [id number]   : 1950  Diagnosis: Breast cancer      Reason for Visit:  On Radiation Treatment Visit     Treatment Summary to Date  Treatment Site: Lt breast and Ax Current Dose: 265/5240 cGy Fractions:       Chemotherapy  Chemo concurrent with radx?: No    ED Visit/Hosiptal Admission: None    Treatment Breaks: No      Subjective:   Cortney is here for her first on-treatment visit. She tolerated treatment well. She has had a recent visit for drainage by Dr. Lam and only 1 ml was drained from her seroma. She is not noting pain or erythema any longer. She was prescribed 3 more days of Cefdinir.     Nursing ROS:   Nutrition Alteration  Diet Type: Patient's Preference  Skin  Skin Intervention: Will start aquaphor        Cardiovascular  Respiratory effort: 1 - Normal - without distress        Psychosocial  Psychosocial Note: Feeling well  Pain Assessment  0-10 Pain Scale: 0      Objective:   BP 99/43   Pulse 65   Wt 55.8 kg (123 lb)   SpO2 100%   BMI 20.01 kg/m    Gen: Appears well, in no acute distress  Skin: No erythema    Labs:  CBC RESULTS:   Recent Labs   Lab Test 24  1224   WBC 6.9   RBC 3.90   HGB 11.5*   HCT 36.7   MCV 94   MCH 29.5   MCHC 31.3*   RDW 14.1        ELECTROLYTES:  Recent Labs   Lab Test 24  1138      POTASSIUM 3.9   CHLORIDE 103   DANIELLA 10.0   CO2 25   BUN 13.5   CR 0.84   GLC 99       Assessment:    Tolerating radiation therapy well.  All questions and concerns addressed.    Toxicities:  Pain: Grade 0: No toxicity  Dermatitis: Grade 0: No toxicity    Plan:   Continue current therapy.    Skin care discussed.       Mosaiq chart and setup information reviewed  Ports checked    Medication Review  Medication changes: No changes per pt    Educational Topic Discussed  Education Instructions:  reviewed      The patient was seen and assessed by my attending, Dr. Cast, who agrees with the above assessment and plan.     Na Ervin MD PGY5  Department of Radiation Oncology  [phone] [facility]  [phone] Pager      I saw and examined the patient with the resident.  I have reviewed and edited the resident's note and agree with the plan of care.               Elza Cast MD

## 2024-06-04 ENCOUNTER — APPOINTMENT (OUTPATIENT)
Dept: RADIATION ONCOLOGY | Facility: CLINIC | Age: 74
End: 2024-06-04
Attending: RADIOLOGY
Payer: COMMERCIAL

## 2024-06-04 PROCEDURE — G6002 STEREOSCOPIC X-RAY GUIDANCE: HCPCS | Mod: 26 | Performed by: RADIOLOGY

## 2024-06-04 PROCEDURE — 77387 GUIDANCE FOR RADJ TX DLVR: CPT | Performed by: RADIOLOGY

## 2024-06-04 PROCEDURE — 77412 RADIATION TX DELIVERY LVL 3: CPT | Performed by: RADIOLOGY

## 2024-06-05 ENCOUNTER — APPOINTMENT (OUTPATIENT)
Dept: RADIATION ONCOLOGY | Facility: CLINIC | Age: 74
End: 2024-06-05
Attending: RADIOLOGY
Payer: COMMERCIAL

## 2024-06-05 PROCEDURE — 77412 RADIATION TX DELIVERY LVL 3: CPT | Performed by: RADIOLOGY

## 2024-06-05 PROCEDURE — 77387 GUIDANCE FOR RADJ TX DLVR: CPT | Performed by: RADIOLOGY

## 2024-06-05 PROCEDURE — G6002 STEREOSCOPIC X-RAY GUIDANCE: HCPCS | Mod: 26 | Performed by: RADIOLOGY

## 2024-06-06 ENCOUNTER — APPOINTMENT (OUTPATIENT)
Dept: RADIATION ONCOLOGY | Facility: CLINIC | Age: 74
End: 2024-06-06
Attending: RADIOLOGY
Payer: COMMERCIAL

## 2024-06-06 PROCEDURE — 77412 RADIATION TX DELIVERY LVL 3: CPT | Performed by: RADIOLOGY

## 2024-06-06 PROCEDURE — G6002 STEREOSCOPIC X-RAY GUIDANCE: HCPCS | Mod: 26 | Performed by: RADIOLOGY

## 2024-06-06 PROCEDURE — 77387 GUIDANCE FOR RADJ TX DLVR: CPT | Performed by: RADIOLOGY

## 2024-06-07 ENCOUNTER — APPOINTMENT (OUTPATIENT)
Dept: RADIATION ONCOLOGY | Facility: CLINIC | Age: 74
End: 2024-06-07
Attending: RADIOLOGY
Payer: COMMERCIAL

## 2024-06-07 PROCEDURE — 77427 RADIATION TX MANAGEMENT X5: CPT | Mod: GC | Performed by: RADIOLOGY

## 2024-06-07 PROCEDURE — 77336 RADIATION PHYSICS CONSULT: CPT | Performed by: RADIOLOGY

## 2024-06-07 PROCEDURE — 77387 GUIDANCE FOR RADJ TX DLVR: CPT | Performed by: RADIOLOGY

## 2024-06-07 PROCEDURE — G6002 STEREOSCOPIC X-RAY GUIDANCE: HCPCS | Mod: 26 | Performed by: RADIOLOGY

## 2024-06-07 PROCEDURE — 77412 RADIATION TX DELIVERY LVL 3: CPT | Performed by: RADIOLOGY

## 2024-06-10 ENCOUNTER — OFFICE VISIT (OUTPATIENT)
Dept: RADIATION ONCOLOGY | Facility: CLINIC | Age: 74
End: 2024-06-10
Attending: RADIOLOGY
Payer: COMMERCIAL

## 2024-06-10 VITALS
OXYGEN SATURATION: 97 % | HEART RATE: 63 BPM | DIASTOLIC BLOOD PRESSURE: 70 MMHG | BODY MASS INDEX: 20.01 KG/M2 | WEIGHT: 123 LBS | SYSTOLIC BLOOD PRESSURE: 104 MMHG

## 2024-06-10 DIAGNOSIS — C50.112 MALIGNANT NEOPLASM OF CENTRAL PORTION OF LEFT BREAST IN FEMALE, ESTROGEN RECEPTOR POSITIVE (H): Primary | ICD-10-CM

## 2024-06-10 DIAGNOSIS — Z17.0 MALIGNANT NEOPLASM OF CENTRAL PORTION OF LEFT BREAST IN FEMALE, ESTROGEN RECEPTOR POSITIVE (H): Primary | ICD-10-CM

## 2024-06-10 PROCEDURE — 77387 GUIDANCE FOR RADJ TX DLVR: CPT | Performed by: RADIOLOGY

## 2024-06-10 PROCEDURE — G6002 STEREOSCOPIC X-RAY GUIDANCE: HCPCS | Mod: 26 | Performed by: RADIOLOGY

## 2024-06-10 PROCEDURE — 77412 RADIATION TX DELIVERY LVL 3: CPT | Performed by: RADIOLOGY

## 2024-06-10 NOTE — LETTER
6/10/2024      Cortney Nagy  [address]  [place] [address]      Dear Colleague,    Thank you for referring your patient, Cortney Nagy, to the [facility] RADIATION ONCOLOGY. Please see a copy of my visit note below.    [facility] PHYSICIANS  SPECIALIZING IN BREAKTHROUGHS  Radiation Oncology    On Treatment Visit Note      Cortney Nagy      Date: 6/10/2024   MRN: [id number]   : 1950  Diagnosis: Breast cancer      Reason for Visit:  On Radiation Treatment Visit     Treatment Summary to Date  Treatment Site: Lt breast and Ax Current Dose: 1590/5240 cGy Fractions:       Chemotherapy  Chemo concurrent with radx?: No    ED Visit/Hosiptal Admission: None    Treatment Breaks: No      Subjective:   Cortney is here for her first on-treatment visit. She is doing well. She is working with physical therapy and doing massage to both the incision of the left breast, as well as to the axilla. No redness or fluctuance to the incision any longer. She is doing well, and continues Aquaphor after treatment as well as before bed.     She also notes multiple mosquito bites and took a Benadryl this morning, and is feeling quite sleepy.     Nursing ROS:   Nutrition Alteration  Diet Type: Patient's Preference  Skin  Skin Intervention: Will start aquaphor        Cardiovascular  Respiratory effort: 1 - Normal - without distress        Psychosocial  Psychosocial Note: Feeling well  Pain Assessment  0-10 Pain Scale: 0      Objective:   /70   Pulse 63   Wt 55.8 kg (123 lb)   SpO2 97%   BMI 20.01 kg/m    Gen: Appears well, in no acute distress  Skin: No erythema. No fluctuance or tenderness of the left breast incision.    Labs:  CBC RESULTS:   Recent Labs   Lab Test 24  1224   WBC 6.9   RBC 3.90   HGB 11.5*   HCT 36.7   MCV 94   MCH 29.5   MCHC 31.3*   RDW 14.1        ELECTROLYTES:  Recent Labs   Lab Test 24  1138      POTASSIUM 3.9   CHLORIDE 103   DANIELLA  10.0   CO2 25   BUN 13.5   CR 0.84   GLC 99       Assessment:    Tolerating radiation therapy well.  All questions and concerns addressed.    Toxicities:  Pain: Grade 0: No toxicity  Dermatitis: Grade 0: No toxicity    Plan:   Continue current therapy.    Skin care discussed.   Discussed not taking Benadryl prior to treatment if she is driving herself as a precautionary measure given the sedating effect      Mosaiq chart and setup information reviewed  Ports checked    Medication Review  Medication changes: No changes per pt    Educational Topic Discussed  Education Instructions: reviewed      The patient was seen and assessed by my attending, Dr. Cast, who agrees with the above assessment and plan.     Na Ervin MD PGY5  Department of Radiation Oncology  [phone] Clinic  [phone] Pager    I saw and examined the patient with the resident.  I have reviewed and edited the resident's note and agree with the plan of care.          Elza Cast MD       Again, thank you for allowing me to participate in the care of your patient.        Sincerely,        Elza Cast MD

## 2024-06-10 NOTE — PROGRESS NOTES
[facility] PHYSICIANS  SPECIALIZING IN BREAKTHROUGHS  Radiation Oncology    On Treatment Visit Note      Cortney Nagy      Date: 6/10/2024   MRN: [id number]   : 1950  Diagnosis: Breast cancer      Reason for Visit:  On Radiation Treatment Visit     Treatment Summary to Date  Treatment Site: Lt breast and Ax Current Dose: 1590/5240 cGy Fractions:       Chemotherapy  Chemo concurrent with radx?: No    ED Visit/Hosiptal Admission: None    Treatment Breaks: No      Subjective:   Cortney is here for her first on-treatment visit. She is doing well. She is working with physical therapy and doing massage to both the incision of the left breast, as well as to the axilla. No redness or fluctuance to the incision any longer. She is doing well, and continues Aquaphor after treatment as well as before bed.     She also notes multiple mosquito bites and took a Benadryl this morning, and is feeling quite sleepy.     Nursing ROS:   Nutrition Alteration  Diet Type: Patient's Preference  Skin  Skin Intervention: Will start aquaphor        Cardiovascular  Respiratory effort: 1 - Normal - without distress        Psychosocial  Psychosocial Note: Feeling well  Pain Assessment  0-10 Pain Scale: 0      Objective:   /70   Pulse 63   Wt 55.8 kg (123 lb)   SpO2 97%   BMI 20.01 kg/m    Gen: Appears well, in no acute distress  Skin: No erythema. No fluctuance or tenderness of the left breast incision.    Labs:  CBC RESULTS:   Recent Labs   Lab Test 24  1224   WBC 6.9   RBC 3.90   HGB 11.5*   HCT 36.7   MCV 94   MCH 29.5   MCHC 31.3*   RDW 14.1        ELECTROLYTES:  Recent Labs   Lab Test 24  1138      POTASSIUM 3.9   CHLORIDE 103   DANIELLA 10.0   CO2 25   BUN 13.5   CR 0.84   GLC 99       Assessment:    Tolerating radiation therapy well.  All questions and concerns addressed.    Toxicities:  Pain: Grade 0: No toxicity  Dermatitis: Grade 0: No toxicity    Plan:   Continue current  therapy.    Skin care discussed.   Discussed not taking Benadryl prior to treatment if she is driving herself as a precautionary measure given the sedating effect      Mosaiq chart and setup information reviewed  Ports checked    Medication Review  Medication changes: No changes per pt    Educational Topic Discussed  Education Instructions: reviewed      The patient was seen and assessed by my attending, Dr. Cast, who agrees with the above assessment and plan.     Na Ervin MD PGY5  Department of Radiation Oncology  [phone] Clinic  [phone] Pager    I saw and examined the patient with the resident.  I have reviewed and edited the resident's note and agree with the plan of care.          Elza Cast MD

## 2024-06-11 ENCOUNTER — APPOINTMENT (OUTPATIENT)
Dept: RADIATION ONCOLOGY | Facility: CLINIC | Age: 74
End: 2024-06-11
Attending: RADIOLOGY
Payer: COMMERCIAL

## 2024-06-11 PROCEDURE — 77387 GUIDANCE FOR RADJ TX DLVR: CPT | Performed by: RADIOLOGY

## 2024-06-11 PROCEDURE — 77412 RADIATION TX DELIVERY LVL 3: CPT | Performed by: RADIOLOGY

## 2024-06-11 PROCEDURE — G6002 STEREOSCOPIC X-RAY GUIDANCE: HCPCS | Mod: 26 | Performed by: RADIOLOGY

## 2024-06-12 ENCOUNTER — APPOINTMENT (OUTPATIENT)
Dept: RADIATION ONCOLOGY | Facility: CLINIC | Age: 74
End: 2024-06-12
Attending: RADIOLOGY
Payer: COMMERCIAL

## 2024-06-12 PROCEDURE — G6002 STEREOSCOPIC X-RAY GUIDANCE: HCPCS | Mod: 26 | Performed by: RADIOLOGY

## 2024-06-12 PROCEDURE — 77412 RADIATION TX DELIVERY LVL 3: CPT | Performed by: RADIOLOGY

## 2024-06-12 PROCEDURE — 77387 GUIDANCE FOR RADJ TX DLVR: CPT | Performed by: RADIOLOGY

## 2024-06-13 ENCOUNTER — APPOINTMENT (OUTPATIENT)
Dept: RADIATION ONCOLOGY | Facility: CLINIC | Age: 74
End: 2024-06-13
Attending: RADIOLOGY
Payer: COMMERCIAL

## 2024-06-13 PROCEDURE — G6002 STEREOSCOPIC X-RAY GUIDANCE: HCPCS | Mod: 26 | Performed by: RADIOLOGY

## 2024-06-13 PROCEDURE — 77387 GUIDANCE FOR RADJ TX DLVR: CPT | Performed by: RADIOLOGY

## 2024-06-13 PROCEDURE — 77412 RADIATION TX DELIVERY LVL 3: CPT | Performed by: RADIOLOGY

## 2024-06-14 ENCOUNTER — APPOINTMENT (OUTPATIENT)
Dept: RADIATION ONCOLOGY | Facility: CLINIC | Age: 74
End: 2024-06-14
Attending: RADIOLOGY
Payer: COMMERCIAL

## 2024-06-14 PROCEDURE — 77427 RADIATION TX MANAGEMENT X5: CPT | Mod: GC | Performed by: RADIOLOGY

## 2024-06-14 PROCEDURE — 77387 GUIDANCE FOR RADJ TX DLVR: CPT | Performed by: RADIOLOGY

## 2024-06-14 PROCEDURE — 77412 RADIATION TX DELIVERY LVL 3: CPT | Performed by: RADIOLOGY

## 2024-06-14 PROCEDURE — G6002 STEREOSCOPIC X-RAY GUIDANCE: HCPCS | Mod: 26 | Performed by: RADIOLOGY

## 2024-06-14 PROCEDURE — 77336 RADIATION PHYSICS CONSULT: CPT | Performed by: RADIOLOGY

## 2024-06-17 ENCOUNTER — APPOINTMENT (OUTPATIENT)
Dept: RADIATION ONCOLOGY | Facility: CLINIC | Age: 74
End: 2024-06-17
Attending: RADIOLOGY
Payer: COMMERCIAL

## 2024-06-17 VITALS
HEART RATE: 60 BPM | WEIGHT: 126 LBS | BODY MASS INDEX: 20.49 KG/M2 | SYSTOLIC BLOOD PRESSURE: 94 MMHG | DIASTOLIC BLOOD PRESSURE: 58 MMHG | OXYGEN SATURATION: 100 %

## 2024-06-17 DIAGNOSIS — C50.112 MALIGNANT NEOPLASM OF CENTRAL PORTION OF LEFT BREAST IN FEMALE, ESTROGEN RECEPTOR POSITIVE (H): Primary | ICD-10-CM

## 2024-06-17 DIAGNOSIS — Z17.0 MALIGNANT NEOPLASM OF CENTRAL PORTION OF LEFT BREAST IN FEMALE, ESTROGEN RECEPTOR POSITIVE (H): Primary | ICD-10-CM

## 2024-06-17 PROCEDURE — 77387 GUIDANCE FOR RADJ TX DLVR: CPT | Performed by: RADIOLOGY

## 2024-06-17 PROCEDURE — 77412 RADIATION TX DELIVERY LVL 3: CPT | Performed by: RADIOLOGY

## 2024-06-17 PROCEDURE — G6002 STEREOSCOPIC X-RAY GUIDANCE: HCPCS | Mod: 26 | Performed by: RADIOLOGY

## 2024-06-17 NOTE — LETTER
2024      Cortney Nagy  [address]  [place] [address]      Dear Colleague,    Thank you for referring your patient, Cortney Nagy, to the [facility] RADIATION ONCOLOGY. Please see a copy of my visit note below.    [facility] PHYSICIANS  SPECIALIZING IN BREAKTHROUGHS  Radiation Oncology    On Treatment Visit Note      Cortney Nagy      Date: 2024   MRN: [id number]   : 1950         Reason for Visit:  On Radiation Treatment Visit     Treatment Summary to Date   L Breast    2915 cGy/4240 cGy +  cGy boost   11 fx / 16 fx + 0/4 boost        ED Visit/Hospital Admission: None    Treatment Breaks: None      Subjective:   Cortney is doing well. She states that she had a very good week last week, in good spirits. However, this morning, she feels down and she misses her late  very much. She has little irritation of the irradiated left breast. She has been using Aquaphor for skin care.            Objective:   There were no vitals taken for this visit.  Gen: Appears well, in no acute distress  Skin: Mild diffuse erythema over treatment field    Labs:  CBC RESULTS:   Recent Labs   Lab Test 24  1224   WBC 6.9   RBC 3.90   HGB 11.5*   HCT 36.7   MCV 94   MCH 29.5   MCHC 31.3*   RDW 14.1        ELECTROLYTES:  Recent Labs   Lab Test 24  1138      POTASSIUM 3.9   CHLORIDE 103   DANIELLA 10.0   CO2 25   BUN 13.5   CR 0.84   GLC 99       Assessment:    Tolerating radiation therapy well.  All questions and concerns addressed.    Toxicities:  Fatigue: Grade 1: Fatigue relieved by rest  Dermatitis: Grade 1: Faint erythema or dry desquamation    Plan:   Continue current therapy.        Mosaiq chart and setup information reviewed  Ports checked                  Elza Cast MD/PhD  [phone] clinic  Pager [phone]    Please do not send letter to referring physician.         Again, thank you for allowing me to participate in the care of  your patient.        Sincerely,        Elza Cast MD

## 2024-06-17 NOTE — PROGRESS NOTES
[facility] PHYSICIANS  SPECIALIZING IN BREAKTHROUGHS  Radiation Oncology    On Treatment Visit Note      Cortney Nagy      Date: 2024   MRN: [id number]   : 1950         Reason for Visit:  On Radiation Treatment Visit     Treatment Summary to Date   L Breast    2915 cGy/4240 cGy +  cGy boost   11 fx / 16 fx + 0/4 boost        ED Visit/Hospital Admission: None    Treatment Breaks: None      Subjective:   Cortney is doing well. She states that she had a very good week last week, in good spirits. However, this morning, she feels down and she misses her late  very much. She has little irritation of the irradiated left breast. She has been using Aquaphor for skin care.            Objective:   There were no vitals taken for this visit.  Gen: Appears well, in no acute distress  Skin: Mild diffuse erythema over treatment field    Labs:  CBC RESULTS:   Recent Labs   Lab Test 24  1224   WBC 6.9   RBC 3.90   HGB 11.5*   HCT 36.7   MCV 94   MCH 29.5   MCHC 31.3*   RDW 14.1        ELECTROLYTES:  Recent Labs   Lab Test 24  1138      POTASSIUM 3.9   CHLORIDE 103   DANIELLA 10.0   CO2 25   BUN 13.5   CR 0.84   GLC 99       Assessment:    Tolerating radiation therapy well.  All questions and concerns addressed.    Toxicities:  Fatigue: Grade 1: Fatigue relieved by rest  Dermatitis: Grade 1: Faint erythema or dry desquamation    Plan:   Continue current therapy.        Mosaiq chart and setup information reviewed  Ports checked                  Elza Cast MD/PhD  [phone] clinic  Pager [phone]    Please do not send letter to referring physician.

## 2024-06-18 ENCOUNTER — APPOINTMENT (OUTPATIENT)
Dept: RADIATION ONCOLOGY | Facility: CLINIC | Age: 74
End: 2024-06-18
Attending: RADIOLOGY
Payer: COMMERCIAL

## 2024-06-18 PROCEDURE — 77334 RADIATION TREATMENT AID(S): CPT | Performed by: RADIOLOGY

## 2024-06-18 PROCEDURE — 77387 GUIDANCE FOR RADJ TX DLVR: CPT | Performed by: RADIOLOGY

## 2024-06-18 PROCEDURE — 77412 RADIATION TX DELIVERY LVL 3: CPT | Performed by: RADIOLOGY

## 2024-06-18 PROCEDURE — 77321 SPECIAL TELETX PORT PLAN: CPT | Performed by: RADIOLOGY

## 2024-06-18 PROCEDURE — 77321 SPECIAL TELETX PORT PLAN: CPT | Mod: 26 | Performed by: RADIOLOGY

## 2024-06-18 PROCEDURE — 77334 RADIATION TREATMENT AID(S): CPT | Mod: 26 | Performed by: RADIOLOGY

## 2024-06-19 ENCOUNTER — APPOINTMENT (OUTPATIENT)
Dept: RADIATION ONCOLOGY | Facility: CLINIC | Age: 74
End: 2024-06-19
Attending: RADIOLOGY
Payer: COMMERCIAL

## 2024-06-19 PROCEDURE — G6002 STEREOSCOPIC X-RAY GUIDANCE: HCPCS | Mod: 26 | Performed by: RADIOLOGY

## 2024-06-19 PROCEDURE — 77387 GUIDANCE FOR RADJ TX DLVR: CPT | Performed by: RADIOLOGY

## 2024-06-19 PROCEDURE — 77412 RADIATION TX DELIVERY LVL 3: CPT | Performed by: RADIOLOGY

## 2024-06-20 ENCOUNTER — APPOINTMENT (OUTPATIENT)
Dept: RADIATION ONCOLOGY | Facility: CLINIC | Age: 74
End: 2024-06-20
Attending: RADIOLOGY
Payer: COMMERCIAL

## 2024-06-20 PROCEDURE — 77412 RADIATION TX DELIVERY LVL 3: CPT | Performed by: RADIOLOGY

## 2024-06-20 PROCEDURE — 77280 THER RAD SIMULAJ FIELD SMPL: CPT | Mod: 26 | Performed by: RADIOLOGY

## 2024-06-20 PROCEDURE — 77280 THER RAD SIMULAJ FIELD SMPL: CPT | Performed by: RADIOLOGY

## 2024-06-21 ENCOUNTER — APPOINTMENT (OUTPATIENT)
Dept: RADIATION ONCOLOGY | Facility: CLINIC | Age: 74
End: 2024-06-21
Attending: RADIOLOGY
Payer: COMMERCIAL

## 2024-06-21 PROCEDURE — 77336 RADIATION PHYSICS CONSULT: CPT | Performed by: RADIOLOGY

## 2024-06-21 PROCEDURE — 77387 GUIDANCE FOR RADJ TX DLVR: CPT | Performed by: RADIOLOGY

## 2024-06-21 PROCEDURE — 77427 RADIATION TX MANAGEMENT X5: CPT | Performed by: RADIOLOGY

## 2024-06-21 PROCEDURE — G6002 STEREOSCOPIC X-RAY GUIDANCE: HCPCS | Mod: 26 | Performed by: RADIOLOGY

## 2024-06-21 PROCEDURE — 77412 RADIATION TX DELIVERY LVL 3: CPT | Performed by: RADIOLOGY

## 2024-06-23 ENCOUNTER — HEALTH MAINTENANCE LETTER (OUTPATIENT)
Age: 74
End: 2024-06-23

## 2024-06-24 ENCOUNTER — APPOINTMENT (OUTPATIENT)
Dept: RADIATION ONCOLOGY | Facility: CLINIC | Age: 74
End: 2024-06-24
Attending: RADIOLOGY
Payer: COMMERCIAL

## 2024-06-24 VITALS
WEIGHT: 125 LBS | OXYGEN SATURATION: 97 % | BODY MASS INDEX: 20.33 KG/M2 | SYSTOLIC BLOOD PRESSURE: 92 MMHG | DIASTOLIC BLOOD PRESSURE: 51 MMHG | HEART RATE: 70 BPM

## 2024-06-24 DIAGNOSIS — Z17.0 MALIGNANT NEOPLASM OF CENTRAL PORTION OF LEFT BREAST IN FEMALE, ESTROGEN RECEPTOR POSITIVE (H): Primary | ICD-10-CM

## 2024-06-24 DIAGNOSIS — C50.112 MALIGNANT NEOPLASM OF CENTRAL PORTION OF LEFT BREAST IN FEMALE, ESTROGEN RECEPTOR POSITIVE (H): Primary | ICD-10-CM

## 2024-06-24 PROCEDURE — G6002 STEREOSCOPIC X-RAY GUIDANCE: HCPCS | Mod: 26 | Performed by: RADIOLOGY

## 2024-06-24 PROCEDURE — 77387 GUIDANCE FOR RADJ TX DLVR: CPT | Performed by: RADIOLOGY

## 2024-06-24 PROCEDURE — 77412 RADIATION TX DELIVERY LVL 3: CPT | Performed by: RADIOLOGY

## 2024-06-24 NOTE — PROGRESS NOTES
[facility] PHYSICIANS  SPECIALIZING IN BREAKTHROUGHS  Radiation Oncology    On Treatment Visit Note      Cortney Nagy      Date: 2024   MRN: [id number]   : 1950  Diagnosis: Breast cancer      Reason for Visit:  On Radiation Treatment Visit     Treatment Summary to Date  Treatment Site: Lt breast and Ax Current Dose: 4240/5240 cGy Fractions:       Chemotherapy  Chemo concurrent with radx?: No    ED Visit/Hospital Admission: None    Treatment Breaks: None      Subjective:   Cortney continues to tolerate radiation therapy well with no significant skin reaction. She remains quite sad with thoughts of her late  and cat. She is managing.     Nursing ROS:   Nutrition Alteration  Diet Type: Patient's Preference  Skin  Skin Reaction: 1 - Faint erythema or dry desquamation  Skin Intervention: BID aquaphor        Cardiovascular  Respiratory effort: 1 - Normal - without distress        Psychosocial  Psychosocial Note: Feeling tired at times  Pain Assessment  0-10 Pain Scale: 0      Objective:   BP 92/51   Pulse 70   Wt 56.7 kg (125 lb)   SpO2 97%   BMI 20.33 kg/m    Gen: Appears well, in no acute distress  Skin: Mild diffuse erythema over treatment field    Labs:  CBC RESULTS:   Recent Labs   Lab Test 24  1224   WBC 6.9   RBC 3.90   HGB 11.5*   HCT 36.7   MCV 94   MCH 29.5   MCHC 31.3*   RDW 14.1        ELECTROLYTES:  Recent Labs   Lab Test 24  1138      POTASSIUM 3.9   CHLORIDE 103   DANIELLA 10.0   CO2 25   BUN 13.5   CR 0.84   GLC 99       Assessment:    Tolerating radiation therapy well.  All questions and concerns addressed.    Toxicities:  Fatigue: Grade 1: Fatigue relieved by rest  Dermatitis: Grade 1: Faint erythema or dry desquamation    Plan:   Continue current therapy.    Will start boost tomorrow and finish radiation this Friday.   Follow up with our clinic in 1 month.       Genterpretiq chart and setup information reviewed  Ports checked    Medication  Review  Medication changes: No changes per pt    Educational Topic Discussed  Education Instructions: reviewed SE        Elza Cast MD/PhD  [phone] clinic  Pager [phone]    Please do not send letter to referring physician.

## 2024-06-25 ENCOUNTER — APPOINTMENT (OUTPATIENT)
Dept: RADIATION ONCOLOGY | Facility: CLINIC | Age: 74
End: 2024-06-25
Attending: RADIOLOGY
Payer: COMMERCIAL

## 2024-06-25 PROCEDURE — 77412 RADIATION TX DELIVERY LVL 3: CPT | Performed by: RADIOLOGY

## 2024-06-26 ENCOUNTER — APPOINTMENT (OUTPATIENT)
Dept: RADIATION ONCOLOGY | Facility: CLINIC | Age: 74
End: 2024-06-26
Attending: RADIOLOGY
Payer: COMMERCIAL

## 2024-06-26 PROCEDURE — 77412 RADIATION TX DELIVERY LVL 3: CPT | Performed by: RADIOLOGY

## 2024-06-27 ENCOUNTER — APPOINTMENT (OUTPATIENT)
Dept: RADIATION ONCOLOGY | Facility: CLINIC | Age: 74
End: 2024-06-27
Attending: RADIOLOGY
Payer: COMMERCIAL

## 2024-06-27 PROCEDURE — 77412 RADIATION TX DELIVERY LVL 3: CPT | Performed by: RADIOLOGY

## 2024-06-28 ENCOUNTER — APPOINTMENT (OUTPATIENT)
Dept: RADIATION ONCOLOGY | Facility: CLINIC | Age: 74
End: 2024-06-28
Attending: RADIOLOGY
Payer: COMMERCIAL

## 2024-06-28 ENCOUNTER — ONCOLOGY VISIT (OUTPATIENT)
Dept: RADIATION ONCOLOGY | Facility: CLINIC | Age: 74
End: 2024-06-28

## 2024-06-28 PROCEDURE — 77412 RADIATION TX DELIVERY LVL 3: CPT | Performed by: RADIOLOGY

## 2024-06-28 PROCEDURE — 77427 RADIATION TX MANAGEMENT X5: CPT | Performed by: RADIOLOGY

## 2024-06-28 PROCEDURE — 77336 RADIATION PHYSICS CONSULT: CPT | Performed by: RADIOLOGY

## 2024-06-28 NOTE — LETTER
6/28/2024      Cortney Nagy  [address]  [place] [address]      Dear Colleague,    Thank you for referring your patient, Cortney Nagy, to the [facility] RADIATION ONCOLOGY. Please see a copy of my visit note below.    No notes on file    Again, thank you for allowing me to participate in the care of your patient.        Sincerely,        Elza Cast MD

## 2024-07-15 ENCOUNTER — PRE VISIT (OUTPATIENT)
Dept: OTOLARYNGOLOGY | Facility: CLINIC | Age: 74
End: 2024-07-15

## 2024-07-17 NOTE — PROGRESS NOTES
[facility] Medical Oncology Note  Date of visit: July 19, 2024  New Outpatient Clinic Note    Assessment:     Recent diagnosis of Stage IA ([id number]) LEFT SIDED moderately differentiated invasive ductal breast cancer status postlumpectomy, and repeat resection to establish negative margins.  This is still breast conservation.   Status post adjuvant breast radiotherapy  Her Oncotype recurrence score is 15 implying no benefit from adjuvant chemotherapy.  However, given the node positivity, she does have least a 30% chance of harboring microscopic metastatic disease elsewhere in the body at this point.  Therefore adjuvant endocrine therapy is recommended.  Given her history of depression, tamoxifen is contraindicated here.  Therefore I do recommend getting started with an aromatase inhibitor.   Side effects of anastrozole were discussed at length with the patient.  These include, but are not limited to, arthralgias, vaginal dryness, hot flashes, and the possibility of bone thinning.  I do not see any DEXA scans on the electronic health record for at least the last 5 years. She says she has had one, which we will try to find.  It apparently showed osteopenia. If so, she is appropriate for adjuvant bisphosphonate as well. We will discuss this more at the next appointment in two months.      Plan:     Begin letrozole 2.5 mg p.o. daily  Get a copy of her DEXA scan from [facility].   Return to clinic with me in a few months to discuss how she is doing on the aromatase inhibitor and to further discuss an adjuvant bisphosphonate.      Abel Luis MD, MSc  Associate Professor of Medicine  [employer] [facility] [employer]  [facility]  [address] [place] [id number]  [phone]    __________________________________________________________________    DIAGNOSIS     Stage IA (pT2N0(i+)MX) moderately differentiated invasive ductal breast cancer, diagnosed definitively at left sided lumpectomy and  sentinel lymph node biopsy 4/5/2024.  The primary tumor was 3.5 cm with associated DCIS and [place] grade 2.  Lymphovascular invasion was positive.  The cancer involves the nipple and dermis by direct extension, though there is no epidermal ulceration.  Inferior margin was involved with less than 1 mm at the inferior inked margin..  There was also LCIS in the specimen.  1 out of 6 lymph nodes was involved with isolated tumor cells (0.08 mm) without extranodal extension.  The biology of the cancer was 95% positive for the estrogen receptor 53% positive for the progesterone receptor negative for HER2 by IHC with 0+ staining, with a JAMIE-67 of 16%. Oncotype RS was 15.  Even at the time of her diagnosis, coincident mammogram did not show any obvious abnormality.      History of Present Illness:     11/10/2023: Has normal bilateral routine screening mammography.  BI-RADS-01 disease.  Sona then palpated a mass posterior to the areolar complex 3 months later.  Mammogram 2/21/2024 still showed no definitive mass or architectural distortion in the bilateral breast.  However ultrasound of the right breast did show an 11 x 10 x 0.8 cm shadowing mass concerning for malignancy.  3/1/2024: Biopsy of the breast shows a moderately differentiated invasive ductal breast cancer.  Follow-up MRI shows a 4.0 x 3.5 x 1.8 cm mass in the left breast that extends from the areolar complex towards the chest wall.  There were no abnormal lymph nodes seen.  Lumpectomy and sentinel lymph node biopsy 4/5/2024.  Path report is as above in Diagnosis (#1).  Repeat resection was done 4/18/2024 with negative margins, per report.  Adjuvant left breast radiotherapy,completed 6/29/2024  Sona is back today to discuss adjuvant endocrine therapy.        Interval history:   Cortney is back.  Tolerated RT pretty well.  After completing it, she had a deep dark decline.Has been very depressed. On paroxetine. Sees a therapist.   Axillary seroma seems to  "have resolved  Seeing a PT and it is helping.  Still struggling with the loss of  Darrell from lung cancer 18 months ago. Discussed at length.  Going to the cabin is amazed blessing, [place] drive, and everything there reminds her of Darrell.  Still with some discomfort at the site of her surgery.  Not inclined to go for reconstruction. Has a prosthesis she is comfortable with.  She reiterates today she wants to do everything possible to give herself the highest chance of cure here.  She has a lot of grandchildren she wants to be around 4.  She denies cough or shortness of breath.  She denies focal neurologic complaint.  There are no urinary or GI symptoms.  Had a DEXA scan last February that showed osteopenia, he thinks.        Past Medical History:   Osteopenia  \"Transient Global Amnesia\"   Past Medical History:   Diagnosis Date    Anemia     Anxiety     Breast cancer (H) 2024    Cerebral aneurysm, nonruptured     Depression     Fainting     Hyperlipemia     Mixed hyperlipidemia     Painful swelling of joint     PONV (postoperative nausea and vomiting)     Raynaud's disease without gangrene     Transient global amnesia           Past Surgical History:    I have reviewed this patient's past surgical history       Social History:   Tobacco, ETOH, and rec drugs reviewed and as noted below with the following exceptions:  Cortney grew up in [place] and graduated from [employer] school in .  She and her  to be Darrell were engaged as seniors in high school.  All of her fellow students could not wait to see her engagement ring.  They were  for 54 years.  Unfortunately he  about a year and a half ago due to metastatic lung cancer.  He got his care at [facility].  This is why she did not wish to go there for her own care.  She has 3 children, two of whom she is close with.  54-year-old Arnold is  to Anahi (who is here with her mother-in-law today).  There are 3 grandchildren, " ages 24, 21, and 17.  There is also a son Rafael, who is single and lives with his significant other in [place].  Daughter Willow is 50 but they are estranged.  There is 1 child there.  Even around the death of Darrell, Willow did not communicate with any of the family members.  She lives by [facility].  She likes to go to the [employer] as well as [employer].  When I ask her what I should know about her, she tells me that she would like to have a hug.  She does struggle with significant depression.      Family History:     Family History   Problem Relation Age of Onset    Heart Disease Mother     Prostate Cancer Father     Diabetes Father     Heart Disease Father     Prostate Cancer Brother     Prostate Cancer Brother             Medications:     Current Outpatient Medications   Medication Sig Dispense Refill    chlorhexidine (PERIDEX) 0.12 % solution SWISH AND SPIT 15ML BY MOUTH FOR 30 SECONDS TWICE DAILY      PARoxetine (PAXIL) 10 MG tablet Take 20 mg by mouth At Bedtime      pravastatin (PRAVACHOL) 10 MG tablet Take 10 mg by mouth At Bedtime                 Physical Exam:   There were no vitals taken for this visit.    ECOG PS: 0  Constitutional: WDWN female in NAD, pleasant and appropriate  HEENT:  NC/AT, no icterus, OP clear, MMM  Skin: No jaundice nor ecchymoses  Lungs: CTAB, no w/r/r, nonlabored breathing  Cardiovascular: RRR, S1, S2, no m/r/g  Abdomen: +BS, soft, nontender, nondistended, no organomegaly nor masses  MSK/Extremities: Warm, well perfused. No edema  LN: no cervical, supraclavicular, axillary, nor inguinal lymphadenopathy  Neurologic: alert, answering questions appropriately, moving all extremities spontaneously. CN 2-12 grossly intact.  Psych: appropriate affect  Breast: The left breast is status post central lumpectomy and sentinel lymph node biopsy.  The scar looks normal with well approximated edges.  The parenchyma around it seems pretty heterogeneous with no concerning finding.  There is  a roughly 1-2 cm seroma posterior to the sentinel lymph node surgery.  The right breast has no significant palpable abnormality.  There is no nipple or skin retraction in the right axilla is negative.    Data:       Final Diagnosis   A. LEFT breast, ultrasound guided/wire-localized partial mastectomy:  -INVASIVE BREAST CARCINOMA OF NO SPECIAL TYPE (INVASIVE DUCTAL CARCINOMA), including single file pattern, JUNG GRADE 2, size 35 mm  -Ductal carcinoma in situ (DCIS), nuclear grade 2, cribriform and solid type(s)  -DCIS is admixed with and adjacent to invasive carcinoma, and comprises <10% of the tumor volume  -Lymphovascular invasion present  -Invasive carcinoma involves the nipple  -Invasive carcinoma involves the dermis, by direct extension  -Epidermis is uninvolved by carcinoma  -No epidermal ulceration  -Inferior margin is involved by invasive carcinoma  -Invasive carcinoma is 4 mm from the posterior margin and > 5 mm from the anterior, superior, medial and lateral margins  -Margins are uninvolved by DCIS  -DCIS is 5 mm from the nearest (inferior) margin and > 5 mm from the remaining margins  -Lobular carcinoma in situ (LCIS), classic type  -Other findings: fibrocystic change (including microcysts with apocrine metaplasia) and sclerosing adenosis  -Calcifications associated with invasive carcinoma, DCIS, and benign acini and stroma  -Prior core biopsy site changes  -See comment  -See tumor synoptic below     B. Lymph node, LEFT axillary, sentinel, excision:  -Isolated tumor cells in one of six lymph nodes (ITC 1/6), size 0.08 mm  -No extranodal extension identified             Recent Labs   Lab Test 04/05/24  1224 08/23/23  1100 11/09/19  0741 11/07/19  1258   WBC 6.9  --   --  5.3   HGB 11.5* 12.0  --  12.2     --  346 374     Recent Labs   Lab Test 04/03/24  1138 02/27/24  1428 11/02/22  0942 11/02/22  0942 12/08/21  1411 09/16/20  1008    139  --  142 140 141   POTASSIUM 3.9 4.3  --  4.3 4.5  "4.7   CHLORIDE 103 101  --  105 103 106   CO2 25 26  --  26 25 26   ANIONGAP 13 12   < > 11 12 9   BUN 13.5 15.4  --  16.3 12 12   CR 0.84 0.75  --  0.75 0.81 0.81   DANIELLA 10.0 9.5  --  9.8 9.7 9.9    < > = values in this interval not displayed.     No results for input(s): \"MAG\", \"PHOS\", \"LDH\", \"URIC\" in the last 63721 hours.  Recent Labs   Lab Test 11/02/22  0942 11/07/19  1258 02/15/16  1003   BILITOTAL 0.6 0.7  --    ALKPHOS 44 44*  --    ALT 13 13  --    AST 19 18 15   ALBUMIN 4.3 4.1  --      [facility](PSAtumormarker:7)    No results found for this or any previous visit (from the past 24 hour(s)).    Other Data           Labs, imaging and treatment plan reviewed with patient. All questions answered.        30 minutes spent on the date of the encounter doing chart review, review of outside records, review of test results, interpretation of tests, patient visit, documentation, discussion with other provider(s), and discussion with family           "

## 2024-07-19 ENCOUNTER — ONCOLOGY VISIT (OUTPATIENT)
Dept: ONCOLOGY | Facility: CLINIC | Age: 74
End: 2024-07-19
Attending: INTERNAL MEDICINE
Payer: COMMERCIAL

## 2024-07-19 VITALS
SYSTOLIC BLOOD PRESSURE: 92 MMHG | HEART RATE: 85 BPM | WEIGHT: 122.7 LBS | RESPIRATION RATE: 16 BRPM | OXYGEN SATURATION: 98 % | BODY MASS INDEX: 19.96 KG/M2 | DIASTOLIC BLOOD PRESSURE: 58 MMHG

## 2024-07-19 DIAGNOSIS — Z17.0 MALIGNANT NEOPLASM INVOLVING BOTH NIPPLE AND AREOLA OF LEFT BREAST IN FEMALE, ESTROGEN RECEPTOR POSITIVE (H): Primary | ICD-10-CM

## 2024-07-19 DIAGNOSIS — C50.012 MALIGNANT NEOPLASM INVOLVING BOTH NIPPLE AND AREOLA OF LEFT BREAST IN FEMALE, ESTROGEN RECEPTOR POSITIVE (H): Primary | ICD-10-CM

## 2024-07-19 PROCEDURE — 99214 OFFICE O/P EST MOD 30 MIN: CPT | Performed by: INTERNAL MEDICINE

## 2024-07-19 PROCEDURE — G0463 HOSPITAL OUTPT CLINIC VISIT: HCPCS | Performed by: INTERNAL MEDICINE

## 2024-07-19 RX ORDER — TRIAMCINOLONE ACETONIDE 1 MG/G
CREAM TOPICAL 2 TIMES DAILY
COMMUNITY
End: 2024-09-05

## 2024-07-19 RX ORDER — LETROZOLE 2.5 MG/1
2.5 TABLET, FILM COATED ORAL DAILY
Qty: 30 TABLET | Refills: 5 | Status: SHIPPED | OUTPATIENT
Start: 2024-07-19

## 2024-07-19 RX ORDER — CHLORAL HYDRATE 500 MG
2 CAPSULE ORAL DAILY
COMMUNITY
End: 2024-09-05

## 2024-07-19 RX ORDER — HYDROCODONE/ACETAMINOPHEN 5 MG-500MG
TABLET ORAL
COMMUNITY

## 2024-07-19 ASSESSMENT — PAIN SCALES - GENERAL: PAINLEVEL: MILD PAIN (3)

## 2024-07-19 NOTE — NURSING NOTE
"Oncology Rooming Note    July 19, 2024 1:41 PM   Cortney Nagy is a 74 year old female who presents for:    Chief Complaint   Patient presents with    Oncology Clinic Visit     Malignant neoplasm of unspecified site of left breast     Initial Vitals: BP 92/58 (BP Location: Right arm, Patient Position: Sitting, Cuff Size: Adult Regular)   Pulse 85   Resp 16   Wt 55.7 kg (122 lb 11.2 oz)   SpO2 98%   BMI 19.96 kg/m   Estimated body mass index is 19.96 kg/m  as calculated from the following:    Height as of 4/29/24: 1.67 m (5' 5.75\").    Weight as of this encounter: 55.7 kg (122 lb 11.2 oz). Body surface area is 1.61 meters squared.  Data Unavailable Comment: Data Unavailable   No LMP recorded. Patient is postmenopausal.  Allergies reviewed: Yes  Medications reviewed: Yes    Medications: Medication refills not needed today.  Pharmacy name entered into [employer]: [id number] [place] [address]    Frailty Screening:   Is the patient here for a new oncology consult visit in cancer care? 2. No      Clinical concerns: left wrist swelling;       Mary Castro              "

## 2024-07-19 NOTE — LETTER
7/19/2024      Cortney Nagy  [address]  [place] [address]      Dear Colleague,    Thank you for referring your patient, Cortney Nagy, to the [facility]. Please see a copy of my visit note below.      [facility] Medical Oncology Note  Date of visit: July 19, 2024  New Outpatient Clinic Note    Assessment:     Recent diagnosis of Stage IA ([id number]) LEFT SIDED moderately differentiated invasive ductal breast cancer status postlumpectomy, and repeat resection to establish negative margins.  This is still breast conservation.   Status post adjuvant breast radiotherapy  Her Oncotype recurrence score is 15 implying no benefit from adjuvant chemotherapy.  However, given the node positivity, she does have least a 30% chance of harboring microscopic metastatic disease elsewhere in the body at this point.  Therefore adjuvant endocrine therapy is recommended.  Given her history of depression, tamoxifen is contraindicated here.  Therefore I do recommend getting started with an aromatase inhibitor.   Side effects of anastrozole were discussed at length with the patient.  These include, but are not limited to, arthralgias, vaginal dryness, hot flashes, and the possibility of bone thinning.  I do not see any DEXA scans on the electronic health record for at least the last 5 years. She says she has had one, which we will try to find.  It apparently showed osteopenia. If so, she is appropriate for adjuvant bisphosphonate as well. We will discuss this more at the next appointment in two months.      Plan:     Begin letrozole 2.5 mg p.o. daily  Get a copy of her DEXA scan from [facility].   Return to clinic with me in a few months to discuss how she is doing on the aromatase inhibitor and to further discuss an adjuvant bisphosphonate.      Abel Luis MD, MSc  Associate Professor of Medicine  [employer] [facility] [employer]  [facility]  [address] [place] [address]  [place] [address]  [phone]    __________________________________________________________________    DIAGNOSIS     Stage IA (pT2N0(i+)MX) moderately differentiated invasive ductal breast cancer, diagnosed definitively at left sided lumpectomy and sentinel lymph node biopsy 4/5/2024.  The primary tumor was 3.5 cm with associated DCIS and [place] grade 2.  Lymphovascular invasion was positive.  The cancer involves the nipple and dermis by direct extension, though there is no epidermal ulceration.  Inferior margin was involved with less than 1 mm at the inferior inked margin..  There was also LCIS in the specimen.  1 out of 6 lymph nodes was involved with isolated tumor cells (0.08 mm) without extranodal extension.  The biology of the cancer was 95% positive for the estrogen receptor 53% positive for the progesterone receptor negative for HER2 by IHC with 0+ staining, with a JAMIE-67 of 16%. Oncotype RS was 15.  Even at the time of her diagnosis, coincident mammogram did not show any obvious abnormality.      History of Present Illness:     11/10/2023: Has normal bilateral routine screening mammography.  BI-RADS-01 disease.  Sona then palpated a mass posterior to the areolar complex 3 months later.  Mammogram 2/21/2024 still showed no definitive mass or architectural distortion in the bilateral breast.  However ultrasound of the right breast did show an 11 x 10 x 0.8 cm shadowing mass concerning for malignancy.  3/1/2024: Biopsy of the breast shows a moderately differentiated invasive ductal breast cancer.  Follow-up MRI shows a 4.0 x 3.5 x 1.8 cm mass in the left breast that extends from the areolar complex towards the chest wall.  There were no abnormal lymph nodes seen.  Lumpectomy and sentinel lymph node biopsy 4/5/2024.  Path report is as above in Diagnosis (#1).  Repeat resection was done 4/18/2024 with negative margins, per report.  Adjuvant left breast radiotherapy,completed 6/29/2024  Sona  "is back today to discuss adjuvant endocrine therapy.        Interval history:   Cortney is back.  Tolerated RT pretty well.  After completing it, she had a deep dark decline.Has been very depressed. On paroxetine. Sees a therapist.   Axillary seroma seems to have resolved  Seeing a PT and it is helping.  Still struggling with the loss of  Darrell from lung cancer 18 months ago. Discussed at length.  [place] the cabin [place] amazed [place], [place] drive, and everything there reminds her of Darrell.  Still with some discomfort at the site of her surgery.  Not inclined to go for reconstruction. Has a prosthesis she is comfortable with.  She reiterates today she wants to do everything possible to give herself the highest chance of cure here.  She has a lot of grandchildren she wants to be around 4.  She denies cough or shortness of breath.  She denies focal neurologic complaint.  There are no urinary or GI symptoms.  Had a DEXA scan last February that showed osteopenia, he thinks.        Past Medical History:   Osteopenia  \"Transient Global Amnesia\"   Past Medical History:   Diagnosis Date     Anemia      Anxiety      Breast cancer (H) 2024     Cerebral aneurysm, nonruptured      Depression      Fainting      Hyperlipemia      Mixed hyperlipidemia      Painful swelling of joint      PONV (postoperative nausea and vomiting)      Raynaud's disease without gangrene      Transient global amnesia           Past Surgical History:    I have reviewed this patient's past surgical history       Social History:   Tobacco, ETOH, and rec drugs reviewed and as noted below with the following exceptions:  Cortney grew up in [place] and graduated from [employer] school in .  She and her  to be Darrell were engaged as seniors in high school.  All of her fellow students could not wait to see her engagement ring.  They were  for 54 years.  Unfortunately he  about a year and a half ago due to metastatic lung " cancer.  He got his care at [facility].  This is why she did not wish to go there for her own care.  She has 3 children, two of whom she is close with.  54-year-old Arnold is  to Anahi (who is here with her mother-in-law today).  There are 3 grandchildren, ages 24, 21, and 17.  There is also a son Rafael, who is single and lives with his significant other in [place].  Daughter Willow is 50 but they are estranged.  There is 1 child there.  Even around the death of Darrell, Willow did not communicate with any of the family members.  She lives by [facility].  She likes to go to the [employer] as well as [employer].  When I ask her what I should know about her, she tells me that she would like to have a hug.  She does struggle with significant depression.      Family History:     Family History   Problem Relation Age of Onset     Heart Disease Mother      Prostate Cancer Father      Diabetes Father      Heart Disease Father      Prostate Cancer Brother      Prostate Cancer Brother             Medications:     Current Outpatient Medications   Medication Sig Dispense Refill     chlorhexidine (PERIDEX) 0.12 % solution SWISH AND SPIT 15ML BY MOUTH FOR 30 SECONDS TWICE DAILY       PARoxetine (PAXIL) 10 MG tablet Take 20 mg by mouth At Bedtime       pravastatin (PRAVACHOL) 10 MG tablet Take 10 mg by mouth At Bedtime                 Physical Exam:   There were no vitals taken for this visit.    ECOG PS: 0  Constitutional: WDWN female in NAD, pleasant and appropriate  HEENT:  NC/AT, no icterus, OP clear, MMM  Skin: No jaundice nor ecchymoses  Lungs: CTAB, no w/r/r, nonlabored breathing  Cardiovascular: RRR, S1, S2, no m/r/g  Abdomen: +BS, soft, nontender, nondistended, no organomegaly nor masses  MSK/Extremities: Warm, well perfused. No edema  LN: no cervical, supraclavicular, axillary, nor inguinal lymphadenopathy  Neurologic: alert, answering questions appropriately, moving all extremities spontaneously. CN  2-12 grossly intact.  Psych: appropriate affect  Breast: The left breast is status post central lumpectomy and sentinel lymph node biopsy.  The scar looks normal with well approximated edges.  The parenchyma around it seems pretty heterogeneous with no concerning finding.  There is a roughly 1-2 cm seroma posterior to the sentinel lymph node surgery.  The right breast has no significant palpable abnormality.  There is no nipple or skin retraction in the right axilla is negative.    Data:       Final Diagnosis   A. LEFT breast, ultrasound guided/wire-localized partial mastectomy:  -INVASIVE BREAST CARCINOMA OF NO SPECIAL TYPE (INVASIVE DUCTAL CARCINOMA), including single file pattern, JUNG GRADE 2, size 35 mm  -Ductal carcinoma in situ (DCIS), nuclear grade 2, cribriform and solid type(s)  -DCIS is admixed with and adjacent to invasive carcinoma, and comprises <10% of the tumor volume  -Lymphovascular invasion present  -Invasive carcinoma involves the nipple  -Invasive carcinoma involves the dermis, by direct extension  -Epidermis is uninvolved by carcinoma  -No epidermal ulceration  -Inferior margin is involved by invasive carcinoma  -Invasive carcinoma is 4 mm from the posterior margin and > 5 mm from the anterior, superior, medial and lateral margins  -Margins are uninvolved by DCIS  -DCIS is 5 mm from the nearest (inferior) margin and > 5 mm from the remaining margins  -Lobular carcinoma in situ (LCIS), classic type  -Other findings: fibrocystic change (including microcysts with apocrine metaplasia) and sclerosing adenosis  -Calcifications associated with invasive carcinoma, DCIS, and benign acini and stroma  -Prior core biopsy site changes  -See comment  -See tumor synoptic below     B. Lymph node, LEFT axillary, sentinel, excision:  -Isolated tumor cells in one of six lymph nodes (ITC 1/6), size 0.08 mm  -No extranodal extension identified             Recent Labs   Lab Test 04/05/24  1224 08/23/23  1100  "11/09/19  0741 11/07/19  1258   WBC 6.9  --   --  5.3   HGB 11.5* 12.0  --  12.2     --  346 374     Recent Labs   Lab Test 04/03/24  1138 02/27/24  1428 11/02/22  0942 11/02/22  0942 12/08/21  1411 09/16/20  1008    139  --  142 140 141   POTASSIUM 3.9 4.3  --  4.3 4.5 4.7   CHLORIDE 103 101  --  105 103 106   CO2 25 26  --  26 25 26   ANIONGAP 13 12   < > 11 12 9   BUN 13.5 15.4  --  16.3 12 12   CR 0.84 0.75  --  0.75 0.81 0.81   DANIELLA 10.0 9.5  --  9.8 9.7 9.9    < > = values in this interval not displayed.     No results for input(s): \"MAG\", \"PHOS\", \"LDH\", \"URIC\" in the last 76929 hours.  Recent Labs   Lab Test 11/02/22  0942 11/07/19  1258 02/15/16  1003   BILITOTAL 0.6 0.7  --    ALKPHOS 44 44*  --    ALT 13 13  --    AST 19 18 15   ALBUMIN 4.3 4.1  --      @[facility](PSAtumormarker:7)    No results found for this or any previous visit (from the past 24 hour(s)).    Other Data           Labs, imaging and treatment plan reviewed with patient. All questions answered.        30 minutes spent on the date of the encounter doing chart review, review of outside records, review of test results, interpretation of tests, patient visit, documentation, discussion with other provider(s), and discussion with family             Again, thank you for allowing me to participate in the care of your patient.        Sincerely,        Abel Luis MD  "

## 2024-07-24 NOTE — PROGRESS NOTES
Medication Therapy Management (MTM) Encounter    ASSESSMENT:                            Medication Adherence/Access: No issues identified    Mental Health   Anxiety and Depression  At this time, paroxetine is not enough to help manage symptoms, and higher doses have resulted in hot flashes which could be exacerbated by recent start of letrozole. Will cross taper onto fluoxetine which worked will for her in the past. Also talked about the future possibility of adding bupropion.    Hyperlipidemia   Stable.    Breast Cancer:   Plan in place with Oncology.    PLAN:                            1. Reduce paroxetine (Paxil) to 10mg (1/2 tablet) every day for at least the next month.    2. Start fluoxetine (Prozac) 10mg one capsule every day.    Follow-up: Return in 6 weeks (on 9/5/2024) for Medication dose check, in person, at 10:30am.    SUBJECTIVE/OBJECTIVE:                          Cortney Nagy is a 74 year old female seen for an initial visit. She was referred to me from Dr. Gillespie.      Reason for visit: Medication Review. review oneome medication choices, might be helpful to change medications, even to one which was tried in the past and did not seem too helpful at that time. Prozac seemed to work well when tried.    Allergies/ADRs: Reviewed in chart  Past Medical History: Reviewed in chart  Tobacco: She reports that she has never smoked. She has never used smokeless tobacco.  Alcohol: not currently using    Medication Adherence/Access: no issues reported    Mental Health   Anxiety and Depression  Paroxetine 20mg once daily - higher doses caused hot flashes in the past; Dr. Gillespie had recommended higher dose at visit last week and she took once, but then forgot and so has been taking as prescribed  Patient reports no current medication side effects.  Current symptoms include: fatigue, lack of appetite, frequent sobbing, overwhelm  Lost her  20 months ago, lost her dog 4 months after that, diagnosed with  "breast cancer in February - finished radiation in June; feels like she is just starting to process cancer diagnosis now  Really hard being alone right now - cabin is \"all [place]\", considering possibly selling? But this is overwhelming  Has a son and daughter in law nearby, 3 neighbors who are also widows -- good support system. Also following with a therapist at [facility].    Medication History:  Sertraline, serzone, bupropion, fluoxetine (worked well but did have sexual side effects at the time)    Had [employer] testing done in 2021:             Hyperlipidemia   Pravastatin 10mg daily  Patient reports no significant myalgias or other side effects.  The 10-year ASCVD risk score (Ni HONG, et al., 2019) is: 7.8%    Values used to calculate the score:      Age: 74 years      Sex: Female      Is Non- : No      Diabetic: No      Tobacco smoker: No      Systolic Blood Pressure: 92 mmHg      Is BP treated: No      HDL Cholesterol: 84 mg/dL      Total Cholesterol: 213 mg/dL     Recent Labs   Lab Test 11/02/23  0931 11/02/22  0942   CHOL 213* 190   HDL 84 68   * 110*   TRIG 62 59       Breast Cancer:   Letrozole 2.5mg daily - started Saturday morning  Feeling so tired, nausea since starting    Today's Vitals: /64   Pulse 71   Ht 5' 6.75\" (1.695 m)   Wt 124 lb (56.2 kg)   BMI 19.57 kg/m    ----------------      I spent 90 minutes with this patient today. All changes were made via collaborative practice agreement with Oneyda Mann PA-C. A copy of the visit note was provided to the patient's provider(s).    A summary of these recommendations was given to the patient.    Luh Barclay, Pharm.D., [facility]  Medication Therapy Management Pharmacist  [phone]          Medication Therapy Recommendations  Anxiety and depression    Current Medication: PARoxetine (PAXIL) 10 MG tablet   Rationale: Condition refractory to medication - Ineffective medication - Effectiveness "   Recommendation: Change Medication - FLUoxetine 10 MG capsule   Status: Accepted per CPA

## 2024-07-25 ENCOUNTER — OFFICE VISIT (OUTPATIENT)
Dept: PHARMACY | Facility: PHYSICIAN GROUP | Age: 74
End: 2024-07-25
Payer: COMMERCIAL

## 2024-07-25 VITALS
HEIGHT: 67 IN | SYSTOLIC BLOOD PRESSURE: 100 MMHG | WEIGHT: 124 LBS | HEART RATE: 71 BPM | DIASTOLIC BLOOD PRESSURE: 64 MMHG | BODY MASS INDEX: 19.46 KG/M2

## 2024-07-25 DIAGNOSIS — F41.9 ANXIETY AND DEPRESSION: Primary | ICD-10-CM

## 2024-07-25 DIAGNOSIS — Z17.0 MALIGNANT NEOPLASM OF BREAST IN FEMALE, ESTROGEN RECEPTOR POSITIVE, UNSPECIFIED LATERALITY, UNSPECIFIED SITE OF BREAST (H): ICD-10-CM

## 2024-07-25 DIAGNOSIS — E78.2 MIXED HYPERLIPIDEMIA: ICD-10-CM

## 2024-07-25 DIAGNOSIS — C50.919 MALIGNANT NEOPLASM OF BREAST IN FEMALE, ESTROGEN RECEPTOR POSITIVE, UNSPECIFIED LATERALITY, UNSPECIFIED SITE OF BREAST (H): ICD-10-CM

## 2024-07-25 DIAGNOSIS — F32.A ANXIETY AND DEPRESSION: Primary | ICD-10-CM

## 2024-07-25 PROCEDURE — 99605 MTMS BY PHARM NP 15 MIN: CPT | Performed by: PHARMACIST

## 2024-07-25 PROCEDURE — 99607 MTMS BY PHARM ADDL 15 MIN: CPT | Performed by: PHARMACIST

## 2024-07-25 NOTE — LETTER
July 25, 2024  Cortney Nagy  [address]  [place] [address]    Dear Ms. Nagy, [facility] - [facility] [place]     Thank you for talking with me on Jul 25, 2024 about your health and medications. As a follow-up to our conversation, I have included two documents:      Your Recommended To-Do List has steps you should take to get the best results from your medications.  Your Medication List will help you keep track of your medications and how to take them.    If you want to talk about these documents, please call Luh Barclay RPH, PharmD, BCACP  at phone: [phone], Monday-Friday 8-4:30pm.    I look forward to working with you and your doctors to make sure your medications work well for you.    Sincerely,  Luh Barclay RPH, PharmD, BCACP   MTM Pharmacist, [facility]

## 2024-07-25 NOTE — LETTER
_  Medication List        Prepared on: Jul 25, 2024     Bring your Medication List when you go to the doctor, hospital, or   emergency room. And, share it with your family or caregivers.     Note any changes to how you take your medications.  Cross out medications when you no longer use them.    Medication How I take it Why I use it Prescriber   letrozole (FEMARA) 2.5 MG tablet Take 1 tablet (2.5 mg) by mouth daily Malignant neoplasm involving both nipple and areola of left breast in female, estrogen receptor positive (H) Abel Luis MD   PARoxetine (PAXIL) 10 MG tablet Take 10 mg by mouth At Bedtime Major Depressive Disorder Lynn Gillespie MD   pravastatin (PRAVACHOL) 10 MG tablet Take 10 mg by mouth At Bedtime  High Amount of Fats in the Blood Lynn Gillespie MD   Fluoxetine (PROZAC) 10MG capsule Take 10mg by mouth once daily Major Depressive Disorder Lynn Gillespie MD   Calcium supplement MEDICATION NAME: Calcium magnesium zinc 333-133-5mg  General Health  Patient Reported         Add new medications, over-the-counter drugs, herbals, vitamins, or  minerals in the blank rows below.    Medication How I take it Why I use it Prescriber                                      Allergies:      - Clindamycin - Swelling  - Aleve [naproxen] - Dizziness  - Codeine - Other (See Comments)  - Penicillins - Hives  - Propoxyphene N-acetaminophen [propoxyphene N-apap] - Unknown  - Sulfa (sulfonamide Antibiotics) [sulfa Antibiotics] - Dizziness        Side effects I have had:      Not on File        Other Information:              My notes and questions:

## 2024-07-25 NOTE — LETTER
"Recommended To-Do List      Prepared on: Jul 25, 2024       You can get the best results from your medications by completing the items on this \"To-Do List.\"      Bring your To-Do List when you go to your doctor. And, share it with your family or caregivers.    My To-Do List:  What we talked about: What I should do:   A medication that is not working    Change the medication you are taking from PARoxetine (PAXIL) to FLUoxetine (PROzac)          What we talked about: What I should do:                     "

## 2024-07-25 NOTE — PATIENT INSTRUCTIONS
Recommendations from today's MTM visit:                                                        1. Reduce paroxetine (Paxil) to 10mg (1/2 tablet) every day for at least the next month.    2. Start fluoxetine (Prozac) 10mg one capsule every day.    Follow-up: Return in 6 weeks (on 9/5/2024) for Medication dose check, in person, at 10:30am.    It was great to speak with you today.  I value your experience and would be very thankful for your time with providing feedback on our clinic survey. You may receive a survey via email or text message in the next few days.     To schedule another MTM appointment, please call the clinic directly or you may call the scheduling line at [phone].    My Clinical Pharmacist's contact information:                                                      Please feel free to contact me with any questions or concerns you have.      Luh Barclay, Pharm.D., [facility]  Medication Therapy Management Pharmacist  [phone]

## 2024-07-27 NOTE — PROGRESS NOTES
Department of Therapeutic Radiology--Radiation Oncology                   [place] [address]  [address] [place]  [address]  Office:  [phone]  Fax:  [phone]   Radiation Oncology Clinic  [address] [place] [address]  Phone:  [phone]  Fax:  [phone]     RE: Cortney Nagy : 1950   MRN: [id number] JOVON: 2024     OUTPATIENT VISIT NOTE       DIAGNOSIS: Invasive ductal carcinoma of the left breast, s/p lumpectomy and SLN Bx, pT2N0(i+)(sn), grade 2, ER+, MS-, HER-     AREAS TREATED: Left beast and axilla     DOSE:  42.4 Gy in 16 fractions to the left breast and axilla level I/II followed by a boost to the lumpectomy cavity of 10 Gy in 4 fractions.    TYPES OF RADIATION GIVEN: 3D conformal radiotherapy                 INTERVAL SINCE COMPLETION OF RADIATION THERAPY: 1 month (she completed the treatment on 2024)    SUBJECTIVE: Ms. Nagy is a 75 yo female with a pT2N0(i+) invasive ductal carcinoma of the left breast cancer. She presented with a  palpable mass in her left breast. Workup with mammogram did not show any concerning findings. It was however seen in the US as an ill-defined hypoechoic mass at 3:00, 1 cm from the nipple. Biopsy established invasive ductal carcinoma, Mallory grade II, no angiolymphatic invasion, ER 95% moderate, MS 53% moderate, HER2 - by IHC (0), Ki-67 16%; DCIS was also focally present. MRI showed a 4.0 x 3.5 x 1.8 cm lateral left breast mass with no suspicious adenopathy. PET/CT was negative for distant metastasis. On 2024, Cortney underwent wire-localized lumpectomy and SLN Bx with oncoplastic closure. Pathology revealed invasive ductal carcinoma, Notthingham grade 2, measuring 3.5 cm in greatest dimension, involving the nipple and dermis by direct extension.. LVSI was present. Associated DCIS was nuclear grade 2, cribriform and solid types, comprising < 10% of the tumor volume. Margin was focally involved at the  "inferior margin for invasive component, 4 mm posterior for invasive component; 5 mm inferior for DCIS.   One of 6 SLN had ITC of 0.08 mm without extracapsular extension. Final stage pT2N0(i+)(sn). She underwent reexcision for margin, with pathology showing no residual tumor.     Cortney's Oncotype Dx returned 15. Thus adjuvant chemotherapy was not recommended.     She then proceeded with adjuvant radiation therapy. She received 4240 cGy to the left breast and axilla level I/II followed by a boost of 1000 cGy in 4 fractions to the lumpectomy cavity. Cortney tolerated the treatment very well with expected mild fatigue and mild dermatitis. She was, however, depressed as she was still having a difficult time grieving the loss of her .     Since completing the radiation therapy, Cortney followed up with Dr. Luis. She was prescribed Letrozole.    Cortney is here today for a routine follow up visit. On interview, she states that her left breast feels \"firm\". There is some lingering tenderness. She sees PT once a week, and has seen a lot of improvement in her cording. She was prescribed a compression bra, but has not been using it. She is contemplating whether to have reconstructive surgery of the left breast and is currently leaning against it. She is still struggling with depression and lack of energy. Her therapist is making adjustment to her antidepressant.        OBJECTIVE:    BP 95/42   Pulse 68   Wt 56.7 kg (125 lb)   SpO2 98%   BMI 19.72 kg/m     Gen: in good spirits  Skin/Breasts: left breast is smaller, compatible with previous central lumpectomy. Residual breast tissue is firm to palpation, with enlarged pores, indicating some level of edema.     ASSESSMENT AND PLAN: In summary, Ms. Nagy is a 73 yo female with a T2N0(i+) invasive ductal carcinoma of the left breast, s/p central lumpectomy and SLN Bx, and 1 month s/p adjuvant radiation therapy. She has some residual edema in her left breast. She is " encouraged to incorporate lymphedema therapy in her PT sessions. She will bring her compression bra to her next session to inquire about the use of it vs. using a knit insert to help reduce the swelling.     Ms. Nagy will primarily follow up with Dr. Luis for tolerance of Letrozole and for routine surveillance. She can see us as needed.          Elza Cast M.D./Ph.D.  Radiation Oncologist   Department of Therapeutic Radiology   [facility]  Phone: [phone]                15 minutes were spent on the date of the encounter doing chart review, history and exam, documentation and further activities as noted above.       Elza Cast MD

## 2024-07-28 NOTE — PROCEDURES
Radiotherapy Treatment Summary          Date of Report: 2024     PATIENT: CORTNEY NAGY  MEDICAL RECORD NO: [id number]  : 1950     DIAGNOSIS: C50.112 Malignant neoplasm of central portion of left female breast  INTENT OF RADIOTHERAPY: Cure  PATHOLOGY:  Invasive ductal carcinoma of the left breast, ER+/MI-/HER2-elisabeth negative                                 STAGE: pT2N0(i+)(sn), Grade 2, ER+/MI-/HER2-elisabeth negative  CONCURRENT SYSTEMIC THERAPY: No                 Details of the treatments summarized below are found in records kept in the Department of Radiation Oncology at [facility].     Treatment Summary:  Radiation Oncology - Course: 1 Protocol:   Treatment Site Current Dose Modality From To Elapsed Days Fx.  1 L breast + Axilla  4,240 cGy 06 X  6/03/2024  2024  21 16  1 L Cavity Boost  1,000 cGy e09  2024   3  4          Dose per Fraction: 1) 265 cGy per fraction to the left breast and axillary levels I & II, 2) 250 cGy per fraction   for boost  Total Dose:  5240 cGy            COMMENTS:  Ms. Nagy is a 72 yo female with a pT2N0(I+) invasive ductal carcinoma of the left breast   cancer. She presented with a  palpable mass in her left breast. Workup with mammogram did not show any   concerning findings. It was however seen in the [place] as an ill-defined hypoechoic mass at 3:00, 1 cm from the   nipple. Biopsy established invasive ductal carcinoma, Mallory grade II, no angiolymphatic invasion, ER   95% moderate, MI 53% moderate, HER2 - by IHC (0), Ki-67 16%; DCIS was also focally present. MRI   showed a 4.0 x 3.5 x 1.8 cm lateral left breast mass with no suspicious adenopathy. PET/CT was negative for   distant metastasis. On 2024, Cortney underwent wire-localized lumpectomy and SLN Bx with oncoplastic   closure. Pathology revealed invasive ductal carcinoma, Notthingham grade 2, measuring 3.5 cm in greatest   dimension, involving the nipple and dermis by direct extension..  LVSI was present. Associated DCIS was   nuclear grade 2, cribriform and solid types, comprising < 10% of the tumor volume. Margin was focally   involved at the inferior margin for invasive component, 4 mm posterior for invasive component; 5 mm inferior   for DCIS.   One of 6 SLN had ITC of 0.08 mm without extracapsular extension. Final stage pT2N0(i+)(sn). She underwent   reexcision for margin, with pathology showing no residual tumor.      Cortney's Oncotype Dx returned 15. Thus adjuvant chemotherapy was not recommended.      She then proceeded with adjuvant radiation therapy. She received 4240 cGy to the left breast and axilla level   I/II followed by a boost of 1000 cGy in 4 fractions to the lumpectomy cavity. Cortney tolerated the treatment   very well with expected mild fatigue and mild dermatitis. She was, however, depressed as she was still having a   difficult time grieving the loss of her .      She will return in 1 month for a follow up visit.                     ED visits/hospitalizations: None     Missed treatments: None     Acute Toxicity Profile by CTC v5.0:     Fatigue: Grade 1: Fatigue relieved by rest  Dermatitis: Grade 1: Faint erythema or dry desquamation        PAIN MANAGEMENT:    not indicated                          FOLLOW UP PLAN:                         1. Follow up with Dr. Cast in Radiation Oncology on 7/31/24        Resident Physician: Na Ervin M.D.   Staff Physician: Elza Cast M.D.     CC: Abel Ramírez MD              Radiation Oncology:  [facility] [address] [place] [address]

## 2024-07-31 ENCOUNTER — OFFICE VISIT (OUTPATIENT)
Dept: RADIATION ONCOLOGY | Facility: CLINIC | Age: 74
End: 2024-07-31
Attending: RADIOLOGY
Payer: COMMERCIAL

## 2024-07-31 VITALS
HEART RATE: 68 BPM | BODY MASS INDEX: 19.72 KG/M2 | DIASTOLIC BLOOD PRESSURE: 42 MMHG | OXYGEN SATURATION: 98 % | SYSTOLIC BLOOD PRESSURE: 95 MMHG | WEIGHT: 125 LBS

## 2024-07-31 DIAGNOSIS — Z17.0 MALIGNANT NEOPLASM OF CENTRAL PORTION OF LEFT BREAST IN FEMALE, ESTROGEN RECEPTOR POSITIVE (H): Primary | ICD-10-CM

## 2024-07-31 DIAGNOSIS — C50.112 MALIGNANT NEOPLASM OF CENTRAL PORTION OF LEFT BREAST IN FEMALE, ESTROGEN RECEPTOR POSITIVE (H): Primary | ICD-10-CM

## 2024-07-31 PROCEDURE — G0463 HOSPITAL OUTPT CLINIC VISIT: HCPCS | Performed by: RADIOLOGY

## 2024-07-31 PROCEDURE — 99024 POSTOP FOLLOW-UP VISIT: CPT | Performed by: RADIOLOGY

## 2024-07-31 RX ORDER — FLUOXETINE 10 MG/1
TABLET, FILM COATED ORAL
COMMUNITY
Start: 2024-07-26 | End: 2024-09-05 | Stop reason: DRUGHIGH

## 2024-07-31 NOTE — PROGRESS NOTES
"Oncology Rooming Note    July 31, 2024 8:41 AM   Cortney Nagy is a 74 year old female who presents for:    Chief Complaint   Patient presents with    Breast Cancer     Radiation follow up     Initial Vitals: BP 95/42   Pulse 68   Wt 56.7 kg (125 lb)   SpO2 98%   BMI 19.72 kg/m   Estimated body mass index is 19.72 kg/m  as calculated from the following:    Height as of 7/17/24: 1.695 m (5' 6.75\").    Weight as of this encounter: 56.7 kg (125 lb). Body surface area is 1.63 meters squared.  Data Unavailable Comment: Data Unavailable   No LMP recorded. Patient is postmenopausal.  Allergies reviewed: Yes  Medications reviewed: Yes    Medications: Medication refills not needed today.  Pharmacy name entered into [employer]: [facility] [id number] [place] [address]    Frailty Screening:   Is the patient here for a new oncology consult visit in cancer care? 2. No      Clinical concerns:   Here follow up  was notified.      Solange Olvera RN              "

## 2024-07-31 NOTE — PROGRESS NOTES
FOLLOW-UP VISIT    Patient Name: Cortney Nagy      : 1950     Age: 74 year old        ______________________________________________________________________________     Chief Complaint   Patient presents with    Breast Cancer     Radiation follow up     BP 95/42   Pulse 68   Wt 56.7 kg (125 lb)   SpO2 98%   BMI 19.72 kg/m       Date Radiation Completed: Lt breast + Ax 5240 cGy finishes 24    Pain  Discomfort with touch.    Labs  Other Labs: No    Imaging  None    Other Appointments: No    Residual Radiation side effect: Continues to feel tired. Skin healed well.     Additional Instructions: Started Letrozole.    Nurse face-to-face time: Level 3:  10 min face to face time

## 2024-07-31 NOTE — LETTER
2024      Cortney Nagy  [address]  [place] [address]      Dear Colleague,    Thank you for referring your patient, Cortney Nagy, to the [facility] RADIATION ONCOLOGY. Please see a copy of my visit note below.    Department of Therapeutic Radiology--Radiation Oncology                   [facility] Mail [address] 494  [address] [place]  [address]  Office:  [phone]  Fax:  [phone]   Radiation Oncology Clinic  [address] [place] [address]  Phone:  [phone]  Fax:  [phone]     RE: Cortney Nagy : 1950   MRN: [id number] JOVON: 2024     OUTPATIENT VISIT NOTE       DIAGNOSIS: Invasive ductal carcinoma of the left breast, s/p lumpectomy and SLN Bx, pT2N0(i+)(sn), grade 2, ER+, AZ-, HER-     AREAS TREATED: Left beast and axilla     DOSE:  42.4 Gy in 16 fractions to the left breast and axilla level I/II followed by a boost to the lumpectomy cavity of 10 Gy in 4 fractions.    TYPES OF RADIATION GIVEN: 3D conformal radiotherapy                 INTERVAL SINCE COMPLETION OF RADIATION THERAPY: 1 month (she completed the treatment on 2024)    SUBJECTIVE: Ms. Nagy is a 73 yo female with a pT2N0(i+) invasive ductal carcinoma of the left breast cancer. She presented with a  palpable mass in her left breast. Workup with mammogram did not show any concerning findings. It was however seen in the [place] as an ill-defined hypoechoic mass at 3:00, 1 cm from the nipple. Biopsy established invasive ductal carcinoma, Mallory grade II, no angiolymphatic invasion, ER 95% moderate, AZ 53% moderate, HER2 - by IHC (0), Ki-67 16%; DCIS was also focally present. MRI showed a 4.0 x 3.5 x 1.8 cm lateral left breast mass with no suspicious adenopathy. PET/CT was negative for distant metastasis. On 2024, Cortney underwent wire-localized lumpectomy and SLN Bx with oncoplastic closure. Pathology revealed invasive ductal carcinoma, [place] grade 2,  "measuring 3.5 cm in greatest dimension, involving the nipple and dermis by direct extension.. LVSI was present. Associated DCIS was nuclear grade 2, cribriform and solid types, comprising < 10% of the tumor volume. Margin was focally involved at the inferior margin for invasive component, 4 mm posterior for invasive component; 5 mm inferior for DCIS.   One of 6 SLN had ITC of 0.08 mm without extracapsular extension. Final stage pT2N0(i+)(sn). She underwent reexcision for margin, with pathology showing no residual tumor.     Cortney's Oncotype Dx returned 15. Thus adjuvant chemotherapy was not recommended.     She then proceeded with adjuvant radiation therapy. She received 4240 cGy to the left breast and axilla level I/II followed by a boost of 1000 cGy in 4 fractions to the lumpectomy cavity. Cortney tolerated the treatment very well with expected mild fatigue and mild dermatitis. She was, however, depressed as she was still having a difficult time grieving the loss of her .     Since completing the radiation therapy, Cortney followed up with Dr. Luis. She was prescribed Letrozole.    Cortney is here today for a routine follow up visit. On interview, she states that her left breast feels \"firm\". There is some lingering tenderness. She sees PT once a week, and has seen a lot of improvement in her cording. She was prescribed a compression bra, but has not been using it. She is contemplating whether to have reconstructive surgery of the left breast and is currently leaning against it. She is still struggling with depression and lack of energy. Her therapist is making adjustment to her antidepressant.        OBJECTIVE:    BP 95/42   Pulse 68   Wt 56.7 kg (125 lb)   SpO2 98%   BMI 19.72 kg/m     Gen: in good spirits  Skin/Breasts: left breast is smaller, compatible with previous central lumpectomy. Residual breast tissue is firm to palpation, with enlarged pores, indicating some level of edema. " "    ASSESSMENT AND PLAN: In summary, Ms. Nagy is a 73 yo female with a T2N0(i+) invasive ductal carcinoma of the left breast, s/p central lumpectomy and SLN Bx, and 1 month s/p adjuvant radiation therapy. She has some residual edema in her left breast. She is encouraged to incorporate lymphedema therapy in her PT sessions. She will bring her compression bra to her next session to inquire about the use of it vs. using a knit insert to help reduce the swelling.     Ms. Nagy will primarily follow up with Dr. Luis for tolerance of Letrozole and for routine surveillance. She can see us as needed.          Elza Cast M.D./Ph.D.  Radiation Oncologist   Department of Therapeutic Radiology   [facility]  Phone: [phone]                15 minutes were spent on the date of the encounter doing chart review, history and exam, documentation and further activities as noted above.       Elza Cast MD     Oncology Rooming Note    July 31, 2024 8:41 AM   Cortney Nagy is a 74 year old female who presents for:    Chief Complaint   Patient presents with     Breast Cancer     Radiation follow up     Initial Vitals: BP 95/42   Pulse 68   Wt 56.7 kg (125 lb)   SpO2 98%   BMI 19.72 kg/m   Estimated body mass index is 19.72 kg/m  as calculated from the following:    Height as of 7/17/24: 1.695 m (5' 6.75\").    Weight as of this encounter: 56.7 kg (125 lb). Body surface area is 1.63 meters squared.  Data Unavailable Comment: Data Unavailable   No LMP recorded. Patient is postmenopausal.  Allergies reviewed: Yes  Medications reviewed: Yes    Medications: Medication refills not needed today.  Pharmacy name entered into [id number] [place] [address]    Frailty Screening:   Is the patient here for a new oncology consult visit in cancer care? 2. No      Clinical concerns:   Here follow up  was notified.      Solange Olvera RN                FOLLOW-UP " VISIT    Patient Name: Cortney Nagy      : 1950     Age: 74 year old        ______________________________________________________________________________     Chief Complaint   Patient presents with     Breast Cancer     Radiation follow up     BP 95/42   Pulse 68   Wt 56.7 kg (125 lb)   SpO2 98%   BMI 19.72 kg/m       Date Radiation Completed: Lt breast + Ax 5240 cGy finishes 24    Pain  Discomfort with touch.    Labs  Other Labs: No    Imaging  None    Other Appointments: No    Residual Radiation side effect: Continues to feel tired. Skin healed well.     Additional Instructions: Started Letrozole.    Nurse face-to-face time: Level 3:  10 min face to face time            Again, thank you for allowing me to participate in the care of your patient.        Sincerely,        Elza Cast MD

## 2024-08-02 ENCOUNTER — PATIENT OUTREACH (OUTPATIENT)
Dept: ONCOLOGY | Facility: CLINIC | Age: 74
End: 2024-08-02
Payer: COMMERCIAL

## 2024-08-02 NOTE — PROGRESS NOTES
[facility]: Cancer Care                                                                                          Chart review for enrollment status     Barbara DE LA CRUZN, RN, OCN  Care Coordinator  [facility]

## 2024-09-04 NOTE — PROGRESS NOTES
Medication Therapy Management (MTM) Encounter    ASSESSMENT:                            Medication Adherence/Access: No issues identified    Mental Health   Anxiety and Depression  Not well controlled, need to continue titration of fluoxetine. May need to consider additional/augmentation in the future such as bupropion or low dose aripiprozole. Would avoid addition of benzodiazapine at this time given some feelings of SI.    Breast Cancer:   Plan in place with Oncology.    PLAN:                            1. Continue fluoxetine (Prozac) 20mg one capsule every day for a total of 2 weeks,  then increase to fluoxetine 40mg once daily. You can take two of the 20mg capsules until gone, and I am sending in a prescription for fluoxetine 40mg one capsule daily.    Follow-up: Return in 5 weeks (on 10/10/2024) for Medication dose check, at 10:30am.    SUBJECTIVE/OBJECTIVE:                          Cortney Nagy is a 74 year old female seen for a follow-up visit.     Reason for visit: Medication Review.     Allergies/ADRs: Reviewed in chart  Past Medical History: Reviewed in chart  Tobacco: She reports that she has never smoked. She has never used smokeless tobacco.  Alcohol: not currently using    Medication Adherence/Access: no issues reported    Mental Health   Anxiety and Depression  Fluoxetine 20mg daily - dose increased 8/27  Paroxetine (Paxil) to 10mg (1/2 tablet) every day - completely off now since 8/27    Patient reports no current medication side effects.  Current symptoms include: worse during transition off paroxetine and on to fluoxetine  Not walking or doing breast cancer exercises right now  Some SI with no plan, but then some good days -- went to the fair, went to breakfast with a friend and felt good after these events  fatigue, lack of appetite, frequent sobbing, overwhelm  Therapy appointment later today - [facility]  Has a son and daughter in law nearby, 3 neighbors who are also widows --  good support system.     Medication History:  Sertraline, serzone, bupropion, fluoxetine (worked well but did have sexual side effects at the time)  Paroxetine doses higher than 20mg - hot flashes    Had [employer] testing done in 2021:           Breast Cancer:   Letrozole 2.5mg daily   Fatigue, nausea, night sweats once a week    Today's Vitals: There were no vitals taken for this visit.  ----------------    I spent 60 minutes with this patient today. All changes were made via collaborative practice agreement with Oneyda Mann PA-C. A copy of the visit note was provided to the patient's provider(s).    A summary of these recommendations was given to the patient.    Luh Barclay, Pharm.D., [facility]  Medication Therapy Management Pharmacist  [phone]          Medication Therapy Recommendations  Anxiety and depression    Current Medication: FLUoxetine (PROZAC) 10 MG tablet (Discontinued)   Rationale: Dose too low - Dosage too low - Effectiveness   Recommendation: Increase Dose - FLUoxetine 40 MG capsule   Status: Accepted per CPA

## 2024-09-05 ENCOUNTER — OFFICE VISIT (OUTPATIENT)
Dept: PHARMACY | Facility: PHYSICIAN GROUP | Age: 74
End: 2024-09-05
Payer: COMMERCIAL

## 2024-09-05 DIAGNOSIS — Z17.0 MALIGNANT NEOPLASM OF BREAST IN FEMALE, ESTROGEN RECEPTOR POSITIVE, UNSPECIFIED LATERALITY, UNSPECIFIED SITE OF BREAST (H): ICD-10-CM

## 2024-09-05 DIAGNOSIS — C50.919 MALIGNANT NEOPLASM OF BREAST IN FEMALE, ESTROGEN RECEPTOR POSITIVE, UNSPECIFIED LATERALITY, UNSPECIFIED SITE OF BREAST (H): ICD-10-CM

## 2024-09-05 DIAGNOSIS — F32.A ANXIETY AND DEPRESSION: Primary | ICD-10-CM

## 2024-09-05 DIAGNOSIS — F41.9 ANXIETY AND DEPRESSION: Primary | ICD-10-CM

## 2024-09-05 PROCEDURE — 99606 MTMS BY PHARM EST 15 MIN: CPT | Performed by: PHARMACIST

## 2024-09-05 PROCEDURE — 99607 MTMS BY PHARM ADDL 15 MIN: CPT | Performed by: PHARMACIST

## 2024-09-05 RX ORDER — FLUOXETINE 40 MG/1
40 CAPSULE ORAL DAILY
COMMUNITY

## 2024-09-09 ENCOUNTER — LAB (OUTPATIENT)
Dept: LAB | Facility: CLINIC | Age: 74
End: 2024-09-09
Attending: INTERNAL MEDICINE
Payer: COMMERCIAL

## 2024-09-09 DIAGNOSIS — Z17.0 MALIGNANT NEOPLASM INVOLVING BOTH NIPPLE AND AREOLA OF LEFT BREAST IN FEMALE, ESTROGEN RECEPTOR POSITIVE (H): ICD-10-CM

## 2024-09-09 DIAGNOSIS — C50.012 MALIGNANT NEOPLASM INVOLVING BOTH NIPPLE AND AREOLA OF LEFT BREAST IN FEMALE, ESTROGEN RECEPTOR POSITIVE (H): ICD-10-CM

## 2024-09-09 LAB
ALBUMIN SERPL BCG-MCNC: 4.5 G/DL (ref 3.5–5.2)
ALP SERPL-CCNC: 54 U/L (ref 40–150)
ALT SERPL W P-5'-P-CCNC: 16 U/L (ref 0–50)
ANION GAP SERPL CALCULATED.3IONS-SCNC: 9 MMOL/L (ref 7–15)
AST SERPL W P-5'-P-CCNC: 25 U/L (ref 0–45)
BILIRUB SERPL-MCNC: 0.3 MG/DL
BUN SERPL-MCNC: 19 MG/DL (ref 8–23)
CALCIUM SERPL-MCNC: 9.7 MG/DL (ref 8.8–10.4)
CHLORIDE SERPL-SCNC: 104 MMOL/L (ref 98–107)
CREAT SERPL-MCNC: 0.75 MG/DL (ref 0.51–0.95)
EGFRCR SERPLBLD CKD-EPI 2021: 83 ML/MIN/1.73M2
GLUCOSE SERPL-MCNC: 71 MG/DL (ref 70–99)
HCO3 SERPL-SCNC: 28 MMOL/L (ref 22–29)
POTASSIUM SERPL-SCNC: 4.2 MMOL/L (ref 3.4–5.3)
PROT SERPL-MCNC: 7 G/DL (ref 6.4–8.3)
SODIUM SERPL-SCNC: 141 MMOL/L (ref 135–145)

## 2024-09-09 PROCEDURE — 36415 COLL VENOUS BLD VENIPUNCTURE: CPT

## 2024-09-09 PROCEDURE — 80053 COMPREHEN METABOLIC PANEL: CPT

## 2024-09-15 NOTE — PROGRESS NOTES
[facility] Medical Oncology Note  Date of visit: September 16, 2024  New Outpatient Clinic Note    Assessment:     Recent diagnosis of Stage IA ([id number]) LEFT SIDED moderately differentiated invasive ductal breast cancer status postlumpectomy, and repeat resection to establish negative margins.  This is still breast conservation.   Status post adjuvant breast radiotherapy  Her Oncotype recurrence score is 15 implying no benefit from adjuvant chemotherapy.  However, given the node positivity, she does have least a 30% chance of harboring microscopic metastatic disease elsewhere in the body at this point.  Therefore adjuvant endocrine therapy is recommended.  We started letrozole at the last visit.  Given her history of depression, tamoxifen is contraindicated here.  Therefore Cortney got started on letrozole at the last visit.   However, her depression has acutely worsened, to the point she is at risk of self harm.  She told me today she will not hurt herself, but I really think she needs a psychiatric consult ASAP.  There is a pharmacist managing her meds but there is no MD in charge.  I think she is at very high risk here and I am out of my area of expertise.  We talked about going to an emergency department if she did change her mind about self-harm and she has agreed to that.        Plan:     Hold letrozole until her depression has improved.  While letrozole is not usually associated with depression, there are some reports that can be.  Psychiatric consult ASAP  Return to clinic with me in 4 weeks to see how things are going  Continue fluoxetine    The longitudinal plan of care for the diagnosis(es)/condition(s) as documented were addressed during this visit. Due to the added complexity in care, I will continue to support Cortney in the subsequent management and with ongoing continuity of care.     Abel Luis MD, MSc  Associate Professor of Medicine  [facility]  [facility]  Cancer Center  [address] [place] [id number]  [phone]    __________________________________________________________________    DIAGNOSIS     Stage IA (pT2N0(i+)MX) moderately differentiated invasive ductal breast cancer, diagnosed definitively at left sided lumpectomy and sentinel lymph node biopsy 4/5/2024.  The primary tumor was 3.5 cm with associated DCIS and Mallory grade 2.  Lymphovascular invasion was positive.  The cancer involves the nipple and dermis by direct extension, though there is no epidermal ulceration.  Inferior margin was involved with less than 1 mm at the inferior inked margin..  There was also LCIS in the specimen.  1 out of 6 lymph nodes was involved with isolated tumor cells (0.08 mm) without extranodal extension.  The biology of the cancer was 95% positive for the estrogen receptor 53% positive for the progesterone receptor negative for HER2 by IHC with 0+ staining, with a KI-67 of 16%. Oncotype RS was 15.  Even at the time of her diagnosis, coincident mammogram did not show any obvious abnormality.      History of Present Illness:     11/10/2023: Has normal bilateral routine screening mammography.  BI-RADS-01 disease.  Sona then palpated a mass posterior to the areolar complex 3 months later.  Mammogram 2/21/2024 still showed no definitive mass or architectural distortion in the bilateral breast.  However ultrasound of the right breast did show an 11 x 10 x 0.8 cm shadowing mass concerning for malignancy.  3/1/2024: Biopsy of the breast shows a moderately differentiated invasive ductal breast cancer.  Follow-up MRI shows a 4.0 x 3.5 x 1.8 cm mass in the left breast that extends from the areolar complex towards the chest wall.  There were no abnormal lymph nodes seen.  Lumpectomy and sentinel lymph node biopsy 4/5/2024.  Path report is as above in Diagnosis (#1).  Repeat resection was done 4/18/2024 with negative margins, per report.  Adjuvant left breast  "radiotherapy,completed 2024  Letrozole initiated 2024.        Interval history:   Cortney is back.  Depression worse. Seem like its worse since starting letrozole.  Cries most of the day.  Doesn't know how she will ever get out of it.  Thought all day yesterday how she will never see her  again.  Thinks often about his last days. The people at [facility] were horrible.  Considers suicide frequently. Guns? Falling down stairs?  Says she will not try to do this after she leaves the [facility].  Not eating much. Losing more weight.  Psychosocial stressors discussed at length.  Has thought about going to an emergency department. Sees a therapist whom she likes        Past Medical History:   Osteopenia  \"Transient Global Amnesia\"   Past Medical History:   Diagnosis Date    Anemia     Anxiety     Breast cancer (H) 2024    Cerebral aneurysm, nonruptured     Depression     Fainting     Hyperlipemia     Mixed hyperlipidemia     Painful swelling of joint     PONV (postoperative nausea and vomiting)     Raynaud's disease without gangrene     Transient global amnesia           Past Surgical History:    I have reviewed this patient's past surgical history       Social History:   Tobacco, ETOH, and rec drugs reviewed and as noted below with the following exceptions:  Cortney grew up in [place] and graduated from [employer] in .  She and her  to be Darrell were engaged as seniors in high school.  All of her fellow students could not wait to see her engagement ring.  They were  for 54 years.  Unfortunately he  about a year and a half ago due to metastatic lung cancer.  He got his care at [facility].  This is why she did not wish to go there for her own care.  She has 3 children, two of whom she is close with.  54-year-old Arnold is  to Anahi (who is here with her mother-in-law today).  There are 3 grandchildren, ages 24, 21, and 17.  There is also a son Rafael, who " is single and lives with his significant other in [place].  Daughter Willow is 50 but they are estranged.  There is 1 child there.  Even around the death of Darrell, Willow did not communicate with any of the family members.  She lives by [facility].  She likes to go to the [employer] as well as [employer].  When I ask her what I should know about her, she tells me that she would like to have a hug.  She does struggle with significant depression.      Family History:     Family History   Problem Relation Age of Onset    Heart Disease Mother     Prostate Cancer Father     Diabetes Father     Heart Disease Father     Prostate Cancer Brother     Prostate Cancer Brother             Medications:     Current Outpatient Medications   Medication Sig Dispense Refill    FLUoxetine (PROZAC) 40 MG capsule Take 40 mg by mouth daily.      letrozole (FEMARA) 2.5 MG tablet Take 1 tablet (2.5 mg) by mouth daily 30 tablet 5    Lutein 6 MG CAPS 10mg      pravastatin (PRAVACHOL) 10 MG tablet Take 10 mg by mouth At Bedtime       UNABLE TO FIND MEDICATION NAME: Calcium magnesium zinc 333-133-5mg                Physical Exam:   There were no vitals taken for this visit.    No exam done today due to the nature of our appointment        Data:       Final Diagnosis   A. LEFT breast, ultrasound guided/wire-localized partial mastectomy:  -INVASIVE BREAST CARCINOMA OF NO SPECIAL TYPE (INVASIVE DUCTAL CARCINOMA), including single file pattern, JUNG GRADE 2, size 35 mm  -Ductal carcinoma in situ (DCIS), nuclear grade 2, cribriform and solid type(s)  -DCIS is admixed with and adjacent to invasive carcinoma, and comprises <10% of the tumor volume  -Lymphovascular invasion present  -Invasive carcinoma involves the nipple  -Invasive carcinoma involves the dermis, by direct extension  -Epidermis is uninvolved by carcinoma  -No epidermal ulceration  -Inferior margin is involved by invasive carcinoma  -Invasive carcinoma is 4 mm from the  "posterior margin and > 5 mm from the anterior, superior, medial and lateral margins  -Margins are uninvolved by DCIS  -DCIS is 5 mm from the nearest (inferior) margin and > 5 mm from the remaining margins  -Lobular carcinoma in situ (LCIS), classic type  -Other findings: fibrocystic change (including microcysts with apocrine metaplasia) and sclerosing adenosis  -Calcifications associated with invasive carcinoma, DCIS, and benign acini and stroma  -Prior core biopsy site changes  -See comment  -See tumor synoptic below     B. Lymph node, LEFT axillary, sentinel, excision:  -Isolated tumor cells in one of six lymph nodes (ITC 1/6), size 0.08 mm  -No extranodal extension identified             Recent Labs   Lab Test 04/05/24  1224 08/23/23  1100 11/09/19  0741 11/07/19  1258   WBC 6.9  --   --  5.3   HGB 11.5* 12.0  --  12.2     --  346 374     Recent Labs   Lab Test 04/03/24  1138 02/27/24  1428 11/02/22  0942 11/02/22  0942 12/08/21  1411 09/16/20  1008    139  --  142 140 141   POTASSIUM 3.9 4.3  --  4.3 4.5 4.7   CHLORIDE 103 101  --  105 103 106   CO2 25 26  --  26 25 26   ANIONGAP 13 12   < > 11 12 9   BUN 13.5 15.4  --  16.3 12 12   CR 0.84 0.75  --  0.75 0.81 0.81   DANIELLA 10.0 9.5  --  9.8 9.7 9.9    < > = values in this interval not displayed.     No results for input(s): \"MAG\", \"PHOS\", \"LDH\", \"URIC\" in the last [id number] hours.  Recent Labs   Lab Test 11/02/22  0942 11/07/19  1258 02/15/16  1003   BILITOTAL 0.6 0.7  --    ALKPHOS 44 44*  --    ALT 13 13  --    AST 19 18 15   ALBUMIN 4.3 4.1  --      @LABRCNT(PSAtumormarker:7)    No results found for this or any previous visit (from the past 24 hour(s)).    Other Data           Labs, imaging and treatment plan reviewed with patient. All questions answered.        30 minutes spent on the date of the encounter doing chart review, review of outside records, review of test results, interpretation of tests, patient visit, documentation, discussion with other " provider(s), and discussion with family

## 2024-09-16 ENCOUNTER — ONCOLOGY VISIT (OUTPATIENT)
Dept: ONCOLOGY | Facility: CLINIC | Age: 74
End: 2024-09-16
Attending: INTERNAL MEDICINE
Payer: COMMERCIAL

## 2024-09-16 ENCOUNTER — E-CONSULT (OUTPATIENT)
Dept: PSYCHIATRY | Facility: CLINIC | Age: 74
End: 2024-09-16

## 2024-09-16 VITALS
OXYGEN SATURATION: 98 % | RESPIRATION RATE: 16 BRPM | HEIGHT: 66 IN | HEART RATE: 72 BPM | DIASTOLIC BLOOD PRESSURE: 62 MMHG | BODY MASS INDEX: 19.82 KG/M2 | TEMPERATURE: 98.5 F | SYSTOLIC BLOOD PRESSURE: 102 MMHG | WEIGHT: 123.3 LBS

## 2024-09-16 DIAGNOSIS — F32.2 CURRENT SEVERE EPISODE OF MAJOR DEPRESSIVE DISORDER WITHOUT PSYCHOTIC FEATURES WITHOUT PRIOR EPISODE (H): ICD-10-CM

## 2024-09-16 DIAGNOSIS — C50.012 MALIGNANT NEOPLASM INVOLVING BOTH NIPPLE AND AREOLA OF LEFT BREAST IN FEMALE, ESTROGEN RECEPTOR POSITIVE (H): Primary | ICD-10-CM

## 2024-09-16 DIAGNOSIS — Z17.0 MALIGNANT NEOPLASM INVOLVING BOTH NIPPLE AND AREOLA OF LEFT BREAST IN FEMALE, ESTROGEN RECEPTOR POSITIVE (H): Primary | ICD-10-CM

## 2024-09-16 PROCEDURE — 99207 E-CONSULT TO PSYCHIATRY (ADULT OUTPT PROVIDER TO SPECIALIST WRITTEN QUESTION & RESPONSE): CPT | Performed by: INTERNAL MEDICINE

## 2024-09-16 PROCEDURE — G0463 HOSPITAL OUTPT CLINIC VISIT: HCPCS | Performed by: INTERNAL MEDICINE

## 2024-09-16 PROCEDURE — G2211 COMPLEX E/M VISIT ADD ON: HCPCS | Performed by: INTERNAL MEDICINE

## 2024-09-16 PROCEDURE — 99207 PR NO CHARGE LOS: CPT | Performed by: PSYCHIATRY & NEUROLOGY

## 2024-09-16 PROCEDURE — 99214 OFFICE O/P EST MOD 30 MIN: CPT | Performed by: INTERNAL MEDICINE

## 2024-09-16 ASSESSMENT — PAIN SCALES - GENERAL: PAINLEVEL: SEVERE PAIN (6)

## 2024-09-16 NOTE — NURSING NOTE
"Oncology Rooming Note    September 16, 2024 11:17 AM   Cortney Nagy is a 74 year old female who presents for:    Chief Complaint   Patient presents with    Oncology Clinic Visit     RTN CCSL Malignant neoplasm of unspecified site of left breast     Initial Vitals: /62 (BP Location: Right arm, Patient Position: Right side, Cuff Size: Adult Regular)   Pulse 72   Temp 98.5  F (36.9  C) (Oral)   Resp 16   Ht 1.685 m (5' 6.34\")   Wt 55.9 kg (123 lb 4.8 oz)   SpO2 98%   BMI 19.70 kg/m   Estimated body mass index is 19.7 kg/m  as calculated from the following:    Height as of this encounter: 1.685 m (5' 6.34\").    Weight as of this encounter: 55.9 kg (123 lb 4.8 oz). Body surface area is 1.62 meters squared.  Severe Pain (6) Comment: Data Unavailable   No LMP recorded. Patient is postmenopausal.  Allergies reviewed: Yes  Medications reviewed: Yes    Medications: Medication refills not needed today.  Pharm[facility] [id number] [place] [address]    Frailty Screening:   Is the patient here for a new oncology consult visit in cancer care? 2. No      Clinical concerns: none      Bijan Arreguin             "

## 2024-09-16 NOTE — PROGRESS NOTES
9/16/2024     E-Consult has been denied due to: Doesn't meet criteria for E-Consult - Asked a logistical question (scheduling, referral request).    Interprofessional consultation requested by:  Abel Luis MD      Clinical Question/Purpose: MY CLINICAL QUESTION IS: Can we keep her from suicide?    Patient assessment and information reviewed:     Recommendations:  E-consults are a way for primary care to ask questions about medication treatment.  We are unable to do E-consults for specialists.  Would recommend sending the patient to her primary care provider to consider mental health treatment.  Or put in a referral for psychiatry for the community as we do not have any urgent way to get a psychiatrist.  A referral to therapy should also be made as this may be more important than medications.        The recommendations provided in this E-Consult are based on a review of clinical data pertinent to the clinical question presented, without a review of the patient's complete medical record or, the benefit of a comprehensive in-person or virtual patient evaluation. This consultation should not replace the clinical judgement and evaluation of the provider ordering this E-Consult. Any new clinical issues, or changes in patient status since the filing of this E-Consult will need to be taken into account when assessing these recommendations. Please contact me if you have further questions.    My total time spent reviewing clinical information and formulating assessment was 5 minutes.        Stevo Medina MD

## 2024-09-16 NOTE — LETTER
9/16/2024      Cortney Nagy  [address]  [place] [address]      Dear Colleague,    Thank you for referring your patient, Cortney Nagy, to the [facility] CANCER [facility]. Please see a copy of my visit note below.      [facility] Medical Oncology Note  Date of visit: September 16, 2024  New Outpatient Clinic Note    Assessment:     Recent diagnosis of Stage IA ([id number]) LEFT SIDED moderately differentiated invasive ductal breast cancer status postlumpectomy, and repeat resection to establish negative margins.  This is still breast conservation.   Status post adjuvant breast radiotherapy  Her Oncotype recurrence score is 15 implying no benefit from adjuvant chemotherapy.  However, given the node positivity, she does have least a 30% chance of harboring microscopic metastatic disease elsewhere in the body at this point.  Therefore adjuvant endocrine therapy is recommended.  We started letrozole at the last visit.  Given her history of depression, tamoxifen is contraindicated here.  Therefore Cortney got started on letrozole at the last visit.   However, her depression has acutely worsened, to the point she is at risk of self harm.  She told me today she will not hurt herself, but I really think she needs a psychiatric consult ASAP.  There is a pharmacist managing her meds but there is no MD in charge.  I think she is at very high risk here and I am out of my area of expertise.  We talked about going to an emergency department if she did change her mind about self-harm and she has agreed to that.        Plan:     Hold letrozole until her depression has improved.  While letrozole is not usually associated with depression, there are some reports that can be.  Psychiatric consult ASAP  Return to clinic with me in 4 weeks to see how things are going  Continue fluoxetine    The longitudinal plan of care for the diagnosis(es)/condition(s) as documented were addressed during this visit. Due  to the added complexity in care, I will continue to support Cortney in the subsequent management and with ongoing continuity of care.     Abel Luis MD, MSc  Associate Professor of Medicine  [employer]  [address] [address]  [phone]    __________________________________________________________________    DIAGNOSIS     Stage IA (pT2N0(i+)MX) moderately differentiated invasive ductal breast cancer, diagnosed definitively at left sided lumpectomy and sentinel lymph node biopsy 4/5/2024.  The primary tumor was 3.5 cm with associated DCIS and Dunbar grade 2.  Lymphovascular invasion was positive.  The cancer involves the nipple and dermis by direct extension, though there is no epidermal ulceration.  Inferior margin was involved with less than 1 mm at the inferior inked margin..  There was also LCIS in the specimen.  1 out of 6 lymph nodes was involved with isolated tumor cells (0.08 mm) without extranodal extension.  The biology of the cancer was 95% positive for the estrogen receptor 53% positive for the progesterone receptor negative for HER2 by IHC with 0+ staining, with a KI-67 of 16%. Oncotype RS was 15.  Even at the time of her diagnosis, coincident mammogram did not show any obvious abnormality.      History of Present Illness:     11/10/2023: Has normal bilateral routine screening mammography.  BI-RADS-01 disease.  Sona then palpated a mass posterior to the areolar complex 3 months later.  Mammogram 2/21/2024 still showed no definitive mass or architectural distortion in the bilateral breast.  However ultrasound of the right breast did show an 11 x 10 x 0.8 cm shadowing mass concerning for malignancy.  3/1/2024: Biopsy of the breast shows a moderately differentiated invasive ductal breast cancer.  Follow-up MRI shows a 4.0 x 3.5 x 1.8 cm mass in the left breast that extends from the areolar complex towards the chest wall.  " There were no abnormal lymph nodes seen.  Lumpectomy and sentinel lymph node biopsy 2024.  Path report is as above in Diagnosis (#1).  Repeat resection was done 2024 with negative margins, per report.  Adjuvant left breast radiotherapy,completed 2024  Letrozole initiated 2024.        Interval history:   Cortney is back.  Depression worse. Seem like its worse since starting letrozole.  Cries most of the day.  Doesn't know how she will ever get out of it.  Thought all day yesterday how she will never see her  again.  Thinks often about his last days. The people at [facility] were horrible.  Considers suicide frequently. Guns? Falling down stairs?  Says she will not try to do this after she leaves the [facility].  Not eating much. Losing more weight.  Psychosocial stressors discussed at length.  Has thought about going to an emergency department. Sees a therapist whom she likes        Past Medical History:   Osteopenia  \"Transient Global Amnesia\"   Past Medical History:   Diagnosis Date     Anemia      Anxiety      Breast cancer (H) 2024     Cerebral aneurysm, nonruptured      Depression      Fainting      Hyperlipemia      Mixed hyperlipidemia      Painful swelling of joint      PONV (postoperative nausea and vomiting)      Raynaud's disease without gangrene      Transient global amnesia           Past Surgical History:    I have reviewed this patient's past surgical history       Social History:   Tobacco, ETOH, and rec drugs reviewed and as noted below with the following exceptions:  Cortney grew up in [place] and graduated from [employer] in .  She and her  to be Darrell were engaged as seniors in high school.  All of her fellow students could not wait to see her engagement ring.  They were  for 54 years.  Unfortunately he  about a year and a half ago due to metastatic lung cancer.  He got his care at [facility].  This is why she did not wish " to go there for her own care.  She has 3 children, two of whom she is close with.  54-year-old Arnold is  to Anahi (who is here with her mother-in-law today).  There are 3 grandchildren, ages 24, 21, and 17.  There is also a son Rafael, who is single and lives with his significant other in [place].  Daughter Willow is 50 but they are estranged.  There is 1 child there.  Even around the death of Darrell, Willow did not communicate with any of the family members.  She lives by [facility].  She likes to go to the [employer] as well as [facility].  When I ask her what I should know about her, she tells me that she would like to have a hug.  She does struggle with significant depression.      Family History:     Family History   Problem Relation Age of Onset     Heart Disease Mother      Prostate Cancer Father      Diabetes Father      Heart Disease Father      Prostate Cancer Brother      Prostate Cancer Brother             Medications:     Current Outpatient Medications   Medication Sig Dispense Refill     FLUoxetine (PROZAC) 40 MG capsule Take 40 mg by mouth daily.       letrozole (FEMARA) 2.5 MG tablet Take 1 tablet (2.5 mg) by mouth daily 30 tablet 5     Lutein 6 MG CAPS 10mg       pravastatin (PRAVACHOL) 10 MG tablet Take 10 mg by mouth At Bedtime        UNABLE TO FIND MEDICATION NAME: Calcium magnesium zinc 333-133-5mg                Physical Exam:   There were no vitals taken for this visit.    No exam done today due to the nature of our appointment        Data:       Final Diagnosis   A. LEFT breast, ultrasound guided/wire-localized partial mastectomy:  -INVASIVE BREAST CARCINOMA OF NO SPECIAL TYPE (INVASIVE DUCTAL CARCINOMA), including single file pattern, JUNG GRADE 2, size 35 mm  -Ductal carcinoma in situ (DCIS), nuclear grade 2, cribriform and solid type(s)  -DCIS is admixed with and adjacent to invasive carcinoma, and comprises <10% of the tumor volume  -Lymphovascular invasion  "present  -Invasive carcinoma involves the nipple  -Invasive carcinoma involves the dermis, by direct extension  -Epidermis is uninvolved by carcinoma  -No epidermal ulceration  -Inferior margin is involved by invasive carcinoma  -Invasive carcinoma is 4 mm from the posterior margin and > 5 mm from the anterior, superior, medial and lateral margins  -Margins are uninvolved by DCIS  -DCIS is 5 mm from the nearest (inferior) margin and > 5 mm from the remaining margins  -Lobular carcinoma in situ (LCIS), classic type  -Other findings: fibrocystic change (including microcysts with apocrine metaplasia) and sclerosing adenosis  -Calcifications associated with invasive carcinoma, DCIS, and benign acini and stroma  -Prior core biopsy site changes  -See comment  -See tumor synoptic below     B. Lymph node, LEFT axillary, sentinel, excision:  -Isolated tumor cells in one of six lymph nodes (ITC 1/6), size 0.08 mm  -No extranodal extension identified             Recent Labs   Lab Test 04/05/24  1224 08/23/23  1100 11/09/19  0741 11/07/19  1258   WBC 6.9  --   --  5.3   HGB 11.5* 12.0  --  12.2     --  346 374     Recent Labs   Lab Test 04/03/24  1138 02/27/24  1428 11/02/22  0942 11/02/22  0942 12/08/21  1411 09/16/20  1008    139  --  142 140 141   POTASSIUM 3.9 4.3  --  4.3 4.5 4.7   CHLORIDE 103 101  --  105 103 106   CO2 25 26  --  26 25 26   ANIONGAP 13 12   < > 11 12 9   BUN 13.5 15.4  --  16.3 12 12   CR 0.84 0.75  --  0.75 0.81 0.81   DANIELLA 10.0 9.5  --  9.8 9.7 9.9    < > = values in this interval not displayed.     No results for input(s): \"MAG\", \"PHOS\", \"LDH\", \"URIC\" in the last 39530 hours.  Recent Labs   Lab Test 11/02/22  0942 11/07/19  1258 02/15/16  1003   BILITOTAL 0.6 0.7  --    ALKPHOS 44 44*  --    ALT 13 13  --    AST 19 18 15   ALBUMIN 4.3 4.1  --      @LABRCNT(PSAtumormarker:7)    No results found for this or any previous visit (from the past 24 hour(s)).    Other Data           Labs, imaging " and treatment plan reviewed with patient. All questions answered.        30 minutes spent on the date of the encounter doing chart review, review of outside records, review of test results, interpretation of tests, patient visit, documentation, discussion with other provider(s), and discussion with family             Again, thank you for allowing me to participate in the care of your patient.        Sincerely,        Abel Luis MD

## 2024-09-18 NOTE — TELEPHONE ENCOUNTER
"FUTURE VISIT INFORMATION      FUTURE VISIT INFORMATION:  Date: 11/21/24  Time: 1:15 PM  Location: [facility]  REFERRAL INFORMATION:  Referring provider:  Betina Sandy APRN CNP   Referring providers clinic:  [facility] PULMONOLOGY   Reason for visit/diagnosis:  Per Pt, dx chronic cough referral from   Betina Sandy recs in epic     RECORDS REQUESTED FROM      Clinic name Comments Records Status Imaging Status   [facility] PULMONOLOGY  12/4/23 referral/ note- Betina Sandy APRN CNP  Epic    [facility] Procedure 8/23/23 PFT Epic            \"Please notify/message CSS if patient completed outside imaging prior to scheduled appointment and/or any outside records that might have been missed at pre visit -Thanks\"  "

## 2024-10-01 ENCOUNTER — VIRTUAL VISIT (OUTPATIENT)
Dept: PSYCHOLOGY | Facility: CLINIC | Age: 74
End: 2024-10-01
Attending: PSYCHOLOGIST
Payer: COMMERCIAL

## 2024-10-01 DIAGNOSIS — F32.0 CURRENT MILD EPISODE OF MAJOR DEPRESSIVE DISORDER WITHOUT PRIOR EPISODE (H): Primary | ICD-10-CM

## 2024-10-01 DIAGNOSIS — F41.1 GENERALIZED ANXIETY DISORDER: ICD-10-CM

## 2024-10-01 DIAGNOSIS — Z63.4 BEREAVEMENT: ICD-10-CM

## 2024-10-01 PROCEDURE — 90791 PSYCH DIAGNOSTIC EVALUATION: CPT | Mod: 93 | Performed by: PSYCHOLOGIST

## 2024-10-01 SDOH — SOCIAL STABILITY - SOCIAL INSECURITY: DISSAPEARANCE AND DEATH OF FAMILY MEMBER: Z63.4
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
## 2024-10-08 ENCOUNTER — PATIENT OUTREACH (OUTPATIENT)
Dept: ONCOLOGY | Facility: CLINIC | Age: 74
End: 2024-10-08
Payer: COMMERCIAL

## 2024-10-08 NOTE — PROGRESS NOTES
[facility]: Cancer Care                                                                                          Patient left a message regarding the referral for a psychiatrist when she saw Dr. Luis. RN reviewed the chart and found that the E consult didn't meet criteria and patient should contact Primary for a referral. RN saw that patient did have an appointment with  Laly Meza PhD, LP on 10/1/2024 and she was going to get patient a referral for a psychiatrist.  RN called patient and left a message to reach out to Laly for a referral. If any questions or concerns to call.     Barbara DE LA CRUZN, RN, OCN  Care Coordinator  [facility]

## 2024-10-10 ENCOUNTER — OFFICE VISIT (OUTPATIENT)
Dept: PHARMACY | Facility: PHYSICIAN GROUP | Age: 74
End: 2024-10-10
Payer: COMMERCIAL

## 2024-10-10 DIAGNOSIS — F32.A ANXIETY AND DEPRESSION: Primary | ICD-10-CM

## 2024-10-10 DIAGNOSIS — F41.9 ANXIETY AND DEPRESSION: Primary | ICD-10-CM

## 2024-10-10 PROCEDURE — 99606 MTMS BY PHARM EST 15 MIN: CPT | Performed by: PHARMACIST

## 2024-10-10 PROCEDURE — 99607 MTMS BY PHARM ADDL 15 MIN: CPT | Performed by: PHARMACIST

## 2024-10-10 NOTE — PATIENT INSTRUCTIONS
Recommendations from today's MTM visit:                                                      ASSESSMENT:                            Medication Adherence/Access: No issues identified    Mental Health   Anxiety and Depression  Psychiatry referral would be helpful in this patient who has tried several selective serotonin reuptake inhibitor's and bupropion with minimal improvement. She needs help coordinating the psychiatry referral.    PLAN:                            1. EKG done today to double check for any cardiac cause of chest pressure symptoms. The results showed a run of ventricular tachycardia, possibly indicative of possible pulmonary disease.    2. Our Care Coordinator Karen was able to help connect the psychiatry referral -- hopefully you will be able to make an appointment with psychiatry soon. You can restart the paroxetine 20mg one tablet daily if needed for worsening depression symptoms while waiting to get in with psych.    Follow-up: Return for as needed for medication questions or concerns.    It was great to speak with you today.  I value your experience and would be very thankful for your time with providing feedback on our clinic survey. You may receive a survey via email or text message in the next few days.     To schedule another MTM appointment, please call the clinic directly or you may call the scheduling line at [phone].    My Clinical Pharmacist's contact information:                                                      Please feel free to contact me with any questions or concerns you have.      Luh Barclay, Pharm.D., [facility]  Medication Therapy Management Pharmacist  [phone]

## 2024-10-10 NOTE — PROGRESS NOTES
"Medication Therapy Management (MTM) Encounter    ASSESSMENT:                            Medication Adherence/Access: No issues identified    Mental Health   Anxiety and Depression  Psychiatry referral would be helpful in this patient who has tried several selective serotonin reuptake inhibitor's and bupropion with minimal improvement. She needs help coordinating the psychiatry referral.    PLAN:                            1. EKG done today to double check for any cardiac cause of chest pressure symptoms. The results showed a run of ventricular tachycardia, possibly indicative of possible pulmonary disease.    2. Our Care Coordinator Karen was able to help connect the psychiatry referral -- hopefully you will be able to make an appointment with psychiatry soon. You can restart the paroxetine 20mg one tablet daily if needed for worsening depression symptoms while waiting to get in with psych.    Follow-up: Return for as needed for medication questions or concerns.    SUBJECTIVE/OBJECTIVE:                          Cortney Nagy is a 74 year old female seen for a follow-up visit.     Reason for visit: Medication Review.     Allergies/ADRs: Reviewed in chart  Past Medical History: Reviewed in chart  Tobacco: She reports that she has never smoked. She has never used smokeless tobacco.  Alcohol: not currently using    Medication Adherence/Access: no issues reported    Mental Health   Anxiety and Depression  No current medications  Fluoxetine 40mg daily - stopped taking this last week, despite instructions from oncology to continue and referral to psycho-oncology. She reports that for the last 3 weeks she has been experiencing a sensation of her throat closing, \"pressing on chest\". She states this sensation has not changed despite holding letrozole and fluoxetine. However, she also reports feeling the best she has felt physically and mentally in a long time since stopping the fluoxetine. She was cross-tapered to this from " paroxetine earlier this fall due to concerns paroxetine was not effective and higher doses caused hot flashes. She denies symptoms of heart burn and is not currently on a PPI. She does have an appointment with ENT in November to assess for silent reflux. She was agreeable to EKG today, which was reviewed with Dr. Wilson and came back WNL. She has received several referrals to psychiatry, including to [facility] and [facility]. She states she received a call from [facility], but was concerned she would have to start over with psychology, and already follows with her therapist through [facility].  She denies SI today.     Medication History:  Sertraline, nefazodone, bupropion, fluoxetine (worked well but did have sexual side effects in the past)  Paroxetine doses higher than 20mg - hot flashes; tapered off and completely off since 8/27    Had [employer] testing done in 2021:             Today's Vitals: There were no vitals taken for this visit.  ----------------    I spent 60 minutes with this patient today. All changes were made via collaborative practice agreement with Oneyda Mann PA-C. A copy of the visit note was provided to the patient's provider(s).    A summary of these recommendations was given to the patient.    Luh Barclay, Pharm.D., [facility]  Medication Therapy Management Pharmacist  [phone]          Medication Therapy Recommendations  No medication therapy recommendations to display

## 2024-10-13 NOTE — PROGRESS NOTES
[facility] Medical Oncology Note  Date of visit: October 13, 2024  Outpatient Clinic Note    Assessment:     Recent diagnosis of Stage IA ([id number]) LEFT SIDED moderately differentiated invasive ductal breast cancer status postlumpectomy, and repeat resection to establish negative margins.  This is still breast conservation.   Status post adjuvant breast radiotherapy  Her Oncotype recurrence score is 15 implying no benefit from adjuvant chemotherapy.  However, given the node positivity, she does have least a 30% chance of harboring microscopic metastatic disease elsewhere in the body at this point.  Therefore adjuvant endocrine therapy is recommended.  We started letrozole three months ago.  However, a month ago at the last visit, Cortney's depression was so severe she was at risk of self-harm. We stopped the letrozole and now 3 weeks later, she appears to be vastly improved. However, she also stopped her Prozac as well. Her depression is no longer causing life altering paralysis.  I ordered a psychiatric consult, but the psychiatrist said they do not do these.  This is something I am going to have to try to understand at some point.  There are clearly other psychiatric issues here. She does have problems with impulse control, as she decided to demonstrate how her cat walked on her body by reaching across and tickling her oncologist in the proximal thigh area. I made it very clear this was not acceptable behavior.  She voiced an understanding of this. I am choosing to move forward, but if this happens again, I will refer her on to another oncologist.  My gift to her today is to continue to hold the letrozole for the next 3 months.  Hopefully this will give her a nice holiday season.  After that we can get back started on it. Again, because her risk of microscopic disease is significant I would like her to get on something at some point.  Tamoxifen is out because of the depression issues.  We will just have to  see how it goes.            Plan:     Continue to hold letrozole  Return to clinic with me in 3 months.  At that point we will likely get back started on letrozole  Continue follow-up with her psychologist.    The longitudinal plan of care for the diagnosis(es)/condition(s) as documented were addressed during this visit. Due to the added complexity in care, I will continue to support Cortney in the subsequent management and with ongoing continuity of care.     Abel Luis MD, MSc  Associate Professor of Medicine  [facility] [place] [address]  [phone]    __________________________________________________________________    DIAGNOSIS     Stage IA (pT2N0(i+)MX) moderately differentiated invasive ductal breast cancer, diagnosed definitively at left sided lumpectomy and sentinel lymph node biopsy 4/5/2024.  The primary tumor was 3.5 cm with associated DCIS and Mallory grade 2.  Lymphovascular invasion was positive.  The cancer involves the nipple and dermis by direct extension, though there is no epidermal ulceration.  Inferior margin was involved with less than 1 mm at the inferior inked margin..  There was also LCIS in the specimen.  1 out of 6 lymph nodes was involved with isolated tumor cells (0.08 mm) without extranodal extension.  The biology of the cancer was 95% positive for the estrogen receptor 53% positive for the progesterone receptor negative for HER2 by IHC with 0+ staining, with a KI-67 of 16%. Oncotype RS was 15.  Even at the time of her diagnosis, coincident mammogram did not show any obvious abnormality.      History of Present Illness:     11/10/2023: Has normal bilateral routine screening mammography.  BI-RADS-01 disease.  Sona then palpated a mass posterior to the areolar complex 3 months later.  Mammogram 2/21/2024 still showed no definitive mass or architectural distortion in the bilateral breast.  However  "ultrasound of the right breast did show an 11 x 10 x 0.8 cm shadowing mass concerning for malignancy.  3/1/2024: Biopsy of the breast shows a moderately differentiated invasive ductal breast cancer.  Follow-up MRI shows a 4.0 x 3.5 x 1.8 cm mass in the left breast that extends from the areolar complex towards the chest wall.  There were no abnormal lymph nodes seen.  Lumpectomy and sentinel lymph node biopsy 2024.  Path report is as above in Diagnosis (#1).  Repeat resection was done 2024 with negative margins, per report.  Adjuvant left breast radiotherapy,completed 2024  Letrozole initiated 2024. It was held 2024 due to severe depression.      Interval history:   Cortney is back.  Feels better after stopping prozac.  Still hasn't taken letrozole.  Will spend holidays with son/daughter-in-law/ and 3 grandkids in mid 20's.  Darrell's birthday is .  Is actually doing okay.  She has been journaling a lot about it.  Saw Dr. Lam last week. Recommends ongoing PT.  No longer consider self-harm.      Past Medical History:   Osteopenia  \"Transient Global Amnesia\"   Past Medical History:   Diagnosis Date    Anemia     Anxiety     Breast cancer (H) 2024    Cerebral aneurysm, nonruptured     Depression     Fainting     Hyperlipemia     Mixed hyperlipidemia     Painful swelling of joint     PONV (postoperative nausea and vomiting)     Raynaud's disease without gangrene     Transient global amnesia           Past Surgical History:    I have reviewed this patient's past surgical history       Social History:   Tobacco, ETOH, and rec drugs reviewed and as noted below with the following exceptions:  Cortney grew up in [place] and graduated from [employer] in .  She and her  to be Darrell were engaged as seniors in high school.  All of her fellow students could not wait to see her engagement ring.  They were  for 54 years.  Unfortunately he  about a year and a " half ago due to metastatic lung cancer.  He got his care at [facility].  This is why she did not wish to go there for her own care.  She has 3 children, two of whom she is close with.  54-year-old Arnold is  to Anahi (who is here with her mother-in-law today).  There are 3 grandchildren, ages 24, 21, and 17.  There is also a son Rafael, who is single and lives with his significant other in [place].  Daughter Willow is 50 but they are estranged.  There is 1 child there.  Even around the death of Darrell, Willow did not communicate with any of the family members.  She lives by [facility].  She likes to go to the [employer] as well as [employer].  When I ask her what I should know about her, she tells me that she would like to have a hug.  She does struggle with significant depression.      Family History:     Family History   Problem Relation Age of Onset    Heart Disease Mother     Prostate Cancer Father     Diabetes Father     Heart Disease Father     Prostate Cancer Brother     Prostate Cancer Brother             Medications:     Current Outpatient Medications   Medication Sig Dispense Refill    letrozole (FEMARA) 2.5 MG tablet Take 1 tablet (2.5 mg) by mouth daily 30 tablet 5    pravastatin (PRAVACHOL) 10 MG tablet Take 10 mg by mouth At Bedtime       UNABLE TO FIND MEDICATION NAME: Calcium magnesium zinc 333-133-5mg                Physical Exam:   There were no vitals taken for this visit.    No exam done today due to the nature of our appointment        Data:       Final Diagnosis   A. LEFT breast, ultrasound guided/wire-localized partial mastectomy:  -INVASIVE BREAST CARCINOMA OF NO SPECIAL TYPE (INVASIVE DUCTAL CARCINOMA), including single file pattern, JUNG GRADE 2, size 35 mm  -Ductal carcinoma in situ (DCIS), nuclear grade 2, cribriform and solid type(s)  -DCIS is admixed with and adjacent to invasive carcinoma, and comprises <10% of the tumor volume  -Lymphovascular invasion  "present  -Invasive carcinoma involves the nipple  -Invasive carcinoma involves the dermis, by direct extension  -Epidermis is uninvolved by carcinoma  -No epidermal ulceration  -Inferior margin is involved by invasive carcinoma  -Invasive carcinoma is 4 mm from the posterior margin and > 5 mm from the anterior, superior, medial and lateral margins  -Margins are uninvolved by DCIS  -DCIS is 5 mm from the nearest (inferior) margin and > 5 mm from the remaining margins  -Lobular carcinoma in situ (LCIS), classic type  -Other findings: fibrocystic change (including microcysts with apocrine metaplasia) and sclerosing adenosis  -Calcifications associated with invasive carcinoma, DCIS, and benign acini and stroma  -Prior core biopsy site changes  -See comment  -See tumor synoptic below     B. Lymph node, LEFT axillary, sentinel, excision:  -Isolated tumor cells in one of six lymph nodes (ITC 1/6), size 0.08 mm  -No extranodal extension identified             Recent Labs   Lab Test 04/05/24  1224 08/23/23  1100 11/09/19  0741 11/07/19  1258   WBC 6.9  --   --  5.3   HGB 11.5* 12.0  --  12.2     --  346 374     Recent Labs   Lab Test 04/03/24  1138 02/27/24  1428 11/02/22  0942 11/02/22  0942 12/08/21  1411 09/16/20  1008    139  --  142 140 141   POTASSIUM 3.9 4.3  --  4.3 4.5 4.7   CHLORIDE 103 101  --  105 103 106   CO2 25 26  --  26 25 26   ANIONGAP 13 12   < > 11 12 9   BUN 13.5 15.4  --  16.3 12 12   CR 0.84 0.75  --  0.75 0.81 0.81   DANIELLA 10.0 9.5  --  9.8 9.7 9.9    < > = values in this interval not displayed.     No results for input(s): \"MAG\", \"PHOS\", \"LDH\", \"URIC\" in the last 22434 hours.  Recent Labs   Lab Test 11/02/22  0942 11/07/19  1258 02/15/16  1003   BILITOTAL 0.6 0.7  --    ALKPHOS 44 44*  --    ALT 13 13  --    AST 19 18 15   ALBUMIN 4.3 4.1  --      @LABRCNT(PSAtumormarker:7)    No results found for this or any previous visit (from the past 24 hour(s)).    Other Data           Labs, imaging " and treatment plan reviewed with patient. All questions answered.        30 minutes spent on the date of the encounter doing chart review, review of outside records, review of test results, interpretation of tests, patient visit, documentation, discussion with other provider(s), and discussion with family

## 2024-10-14 ENCOUNTER — ONCOLOGY VISIT (OUTPATIENT)
Dept: ONCOLOGY | Facility: CLINIC | Age: 74
End: 2024-10-14
Attending: INTERNAL MEDICINE
Payer: COMMERCIAL

## 2024-10-14 VITALS
WEIGHT: 123.3 LBS | SYSTOLIC BLOOD PRESSURE: 98 MMHG | BODY MASS INDEX: 19.7 KG/M2 | TEMPERATURE: 98 F | OXYGEN SATURATION: 98 % | DIASTOLIC BLOOD PRESSURE: 65 MMHG | HEART RATE: 84 BPM | RESPIRATION RATE: 12 BRPM

## 2024-10-14 DIAGNOSIS — Z17.0 MALIGNANT NEOPLASM INVOLVING BOTH NIPPLE AND AREOLA OF LEFT BREAST IN FEMALE, ESTROGEN RECEPTOR POSITIVE (H): Primary | ICD-10-CM

## 2024-10-14 DIAGNOSIS — C50.012 MALIGNANT NEOPLASM INVOLVING BOTH NIPPLE AND AREOLA OF LEFT BREAST IN FEMALE, ESTROGEN RECEPTOR POSITIVE (H): Primary | ICD-10-CM

## 2024-10-14 PROCEDURE — 99214 OFFICE O/P EST MOD 30 MIN: CPT | Performed by: INTERNAL MEDICINE

## 2024-10-14 PROCEDURE — G0463 HOSPITAL OUTPT CLINIC VISIT: HCPCS | Performed by: INTERNAL MEDICINE

## 2024-10-14 PROCEDURE — G2211 COMPLEX E/M VISIT ADD ON: HCPCS | Performed by: INTERNAL MEDICINE

## 2024-10-14 ASSESSMENT — PAIN SCALES - GENERAL: PAINLEVEL: MILD PAIN (2)

## 2024-10-14 NOTE — LETTER
10/14/2024      Cortney Nagy  [address]  [place] [address]      Dear Colleague,    Thank you for referring your patient, Cortney Nagy, to the [facility]. Please see a copy of my visit note below.      [facility] Medical Oncology Note  Date of visit: October 13, 2024  Outpatient Clinic Note    Assessment:     Recent diagnosis of Stage IA ([id number]) LEFT SIDED moderately differentiated invasive ductal breast cancer status postlumpectomy, and repeat resection to establish negative margins.  This is still breast conservation.   Status post adjuvant breast radiotherapy  Her Oncotype recurrence score is 15 implying no benefit from adjuvant chemotherapy.  However, given the node positivity, she does have least a 30% chance of harboring microscopic metastatic disease elsewhere in the body at this point.  Therefore adjuvant endocrine therapy is recommended.  We started letrozole three months ago.  However, a month ago at the last visit, Cortney's depression was so severe she was at risk of self-harm. We stopped the letrozole and now 3 weeks later, she appears to be vastly improved. However, she also stopped her Prozac as well. Her depression is no longer causing life altering paralysis.  I ordered a psychiatric consult, but the psychiatrist said they do not do these.  This is something I am going to have to try to understand at some point.  There are clearly other psychiatric issues here. She does have problems with impulse control, as she decided to demonstrate how her cat walked on her body by reaching across and tickling her oncologist in the proximal thigh area. I made it very clear this was not acceptable behavior.  She voiced an understanding of this. I am choosing to move forward, but if this happens again, I will refer her on to another oncologist.  My gift to her today is to continue to hold the letrozole for the next 3 months.  Hopefully this will give her a nice  holiday season.  After that we can get back started on it. Again, because her risk of microscopic disease is significant I would like her to get on something at some point.  Tamoxifen is out because of the depression issues.  We will just have to see how it goes.            Plan:     Continue to hold letrozole  Return to clinic with me in 3 months.  At that point we will likely get back started on letrozole  Continue follow-up with her psychologist.    The longitudinal plan of care for the diagnosis(es)/condition(s) as documented were addressed during this visit. Due to the added complexity in care, I will continue to support Cortney in the subsequent management and with ongoing continuity of care.     Abel Luis MD, MSc  Associate Professor of Medicine  [facility] [employer]  [facility] [address]  [phone]    __________________________________________________________________    DIAGNOSIS     Stage IA (pT2N0(i+)MX) moderately differentiated invasive ductal breast cancer, diagnosed definitively at left sided lumpectomy and sentinel lymph node biopsy 4/5/2024.  The primary tumor was 3.5 cm with associated DCIS and Carson grade 2.  Lymphovascular invasion was positive.  The cancer involves the nipple and dermis by direct extension, though there is no epidermal ulceration.  Inferior margin was involved with less than 1 mm at the inferior inked margin..  There was also LCIS in the specimen.  1 out of 6 lymph nodes was involved with isolated tumor cells (0.08 mm) without extranodal extension.  The biology of the cancer was 95% positive for the estrogen receptor 53% positive for the progesterone receptor negative for HER2 by IHC with 0+ staining, with a KI-67 of 16%. Oncotype RS was 15.  Even at the time of her diagnosis, coincident mammogram did not show any obvious abnormality.      History of Present Illness:     11/10/2023: Has normal  "bilateral routine screening mammography.  BI-RADS-01 disease.  Sona then palpated a mass posterior to the areolar complex 3 months later.  Mammogram 2/21/2024 still showed no definitive mass or architectural distortion in the bilateral breast.  However ultrasound of the right breast did show an 11 x 10 x 0.8 cm shadowing mass concerning for malignancy.  3/1/2024: Biopsy of the breast shows a moderately differentiated invasive ductal breast cancer.  Follow-up MRI shows a 4.0 x 3.5 x 1.8 cm mass in the left breast that extends from the areolar complex towards the chest wall.  There were no abnormal lymph nodes seen.  Lumpectomy and sentinel lymph node biopsy 4/5/2024.  Path report is as above in Diagnosis (#1).  Repeat resection was done 4/18/2024 with negative margins, per report.  Adjuvant left breast radiotherapy,completed 6/29/2024  Letrozole initiated 7/19/2024. It was held 9/16/2024 due to severe depression.      Interval history:   Cortney is back.  Feels better after stopping prozac.  Still hasn't taken letrozole.  Will spend holidays with son/daughter-in-law/ and 3 grandkids in mid 20's.  Darrell's birthday is [birth year].  Is actually doing okay.  She has been journaling a lot about it.  Saw Dr. Lam last week. Recommends ongoing PT.  No longer consider self-harm.      Past Medical History:   Osteopenia  \"Transient Global Amnesia\"   Past Medical History:   Diagnosis Date     Anemia      Anxiety      Breast cancer (H) 02/2024     Cerebral aneurysm, nonruptured      Depression      Fainting      Hyperlipemia      Mixed hyperlipidemia      Painful swelling of joint      PONV (postoperative nausea and vomiting)      Raynaud's disease without gangrene      Transient global amnesia           Past Surgical History:    I have reviewed this patient's past surgical history       Social History:   Tobacco, ETOH, and rec drugs reviewed and as noted below with the following exceptions:  Cortney grew up in [place] and " graduated from [employer] in .  She and her  to be Darrell were engaged as seniors in high school.  All of her fellow students could not wait to see her engagement ring.  They were  for 54 years.  Unfortunately he  about a year and a half ago due to metastatic lung cancer.  He got his care at [facility].  This is why she did not wish to go there for her own care.  She has 3 children, two of whom she is close with.  54-year-old Arnold is  to Anahi (who is here with her mother-in-law today).  There are 3 grandchildren, ages 24, 21, and 17.  There is also a son Rafael, who is single and lives with his significant other in [place].  Daughter Willow is 50 but they are estranged.  There is 1 child there.  Even around the death of Darrell, Willow did not communicate with any of the family members.  She lives by [facility].  She likes to go to the [employer] as well as [employer].  When I ask her what I should know about her, she tells me that she would like to have a hug.  She does struggle with significant depression.      Family History:     Family History   Problem Relation Age of Onset     Heart Disease Mother      Prostate Cancer Father      Diabetes Father      Heart Disease Father      Prostate Cancer Brother      Prostate Cancer Brother             Medications:     Current Outpatient Medications   Medication Sig Dispense Refill     letrozole (FEMARA) 2.5 MG tablet Take 1 tablet (2.5 mg) by mouth daily 30 tablet 5     pravastatin (PRAVACHOL) 10 MG tablet Take 10 mg by mouth At Bedtime        UNABLE TO FIND MEDICATION NAME: Calcium magnesium zinc 333-133-5mg                Physical Exam:   There were no vitals taken for this visit.    No exam done today due to the nature of our appointment        Data:       Final Diagnosis   A. LEFT breast, ultrasound guided/wire-localized partial mastectomy:  -INVASIVE BREAST CARCINOMA OF NO SPECIAL TYPE (INVASIVE DUCTAL CARCINOMA),  "including single file pattern, JUNG GRADE 2, size 35 mm  -Ductal carcinoma in situ (DCIS), nuclear grade 2, cribriform and solid type(s)  -DCIS is admixed with and adjacent to invasive carcinoma, and comprises <10% of the tumor volume  -Lymphovascular invasion present  -Invasive carcinoma involves the nipple  -Invasive carcinoma involves the dermis, by direct extension  -Epidermis is uninvolved by carcinoma  -No epidermal ulceration  -Inferior margin is involved by invasive carcinoma  -Invasive carcinoma is 4 mm from the posterior margin and > 5 mm from the anterior, superior, medial and lateral margins  -Margins are uninvolved by DCIS  -DCIS is 5 mm from the nearest (inferior) margin and > 5 mm from the remaining margins  -Lobular carcinoma in situ (LCIS), classic type  -Other findings: fibrocystic change (including microcysts with apocrine metaplasia) and sclerosing adenosis  -Calcifications associated with invasive carcinoma, DCIS, and benign acini and stroma  -Prior core biopsy site changes  -See comment  -See tumor synoptic below     B. Lymph node, LEFT axillary, sentinel, excision:  -Isolated tumor cells in one of six lymph nodes (ITC 1/6), size 0.08 mm  -No extranodal extension identified             Recent Labs   Lab Test 04/05/24  1224 08/23/23  1100 11/09/19  0741 11/07/19  1258   WBC 6.9  --   --  5.3   HGB 11.5* 12.0  --  12.2     --  346 374     Recent Labs   Lab Test 04/03/24  1138 02/27/24  1428 11/02/22  0942 11/02/22  0942 12/08/21  1411 09/16/20  1008    139  --  142 140 141   POTASSIUM 3.9 4.3  --  4.3 4.5 4.7   CHLORIDE 103 101  --  105 103 106   CO2 25 26  --  26 25 26   ANIONGAP 13 12   < > 11 12 9   BUN 13.5 15.4  --  16.3 12 12   CR 0.84 0.75  --  0.75 0.81 0.81   DANIELLA 10.0 9.5  --  9.8 9.7 9.9    < > = values in this interval not displayed.     No results for input(s): \"MAG\", \"PHOS\", \"LDH\", \"URIC\" in the last 70689 hours.  Recent Labs   Lab Test 11/02/22  0942 " 11/07/19  1258 02/15/16  1003   BILITOTAL 0.6 0.7  --    ALKPHOS 44 44*  --    ALT 13 13  --    AST 19 18 15   ALBUMIN 4.3 4.1  --      @LABRCNT(PSAtumormarker:7)    No results found for this or any previous visit (from the past 24 hour(s)).    Other Data           Labs, imaging and treatment plan reviewed with patient. All questions answered.        30 minutes spent on the date of the encounter doing chart review, review of outside records, review of test results, interpretation of tests, patient visit, documentation, discussion with other provider(s), and discussion with family             Again, thank you for allowing me to participate in the care of your patient.        Sincerely,        Abel Luis MD

## 2024-10-14 NOTE — NURSING NOTE
"Oncology Rooming Note    October 14, 2024 9:23 AM   Cortney Nagy is a 74 year old female who presents for:    Chief Complaint   Patient presents with    Oncology Clinic Visit     Malignant neoplasm of unspecified site of left breast     Initial Vitals: BP 98/65 (BP Location: Right arm, Patient Position: Sitting, Cuff Size: Adult Small)   Pulse 84   Temp 98  F (36.7  C) (Oral)   Resp 12   Wt 55.9 kg (123 lb 4.8 oz)   SpO2 98%   BMI 19.70 kg/m   Estimated body mass index is 19.7 kg/m  as calculated from the following:    Height as of 9/16/24: 1.685 m (5' 6.34\").    Weight as of this encounter: 55.9 kg (123 lb 4.8 oz). Body surface area is 1.62 meters squared.  Mild Pain (2) Comment: Data Unavailable   No LMP recorded. Patient is postmenopausal.  Allergies reviewed: Yes  Medications reviewed: Yes    Medications: Medication refills not needed today.  Pharmacy name entered into [employer]: [id number] [place] [address]    Frailty Screening:   Is the patient here for a new oncology consult visit in cancer care? 2. No      Clinical concerns:  Wants to discuss letrozole       Mary Castro              "

## 2024-11-21 ENCOUNTER — PRE VISIT (OUTPATIENT)
Dept: OTOLARYNGOLOGY | Facility: CLINIC | Age: 74
End: 2024-11-21

## 2024-11-21 ENCOUNTER — THERAPY VISIT (OUTPATIENT)
Dept: SPEECH THERAPY | Facility: CLINIC | Age: 74
End: 2024-11-21
Payer: COMMERCIAL

## 2024-11-21 ENCOUNTER — OFFICE VISIT (OUTPATIENT)
Dept: OTOLARYNGOLOGY | Facility: CLINIC | Age: 74
End: 2024-11-21
Attending: NURSE PRACTITIONER
Payer: COMMERCIAL

## 2024-11-21 DIAGNOSIS — R05.3 CHRONIC COUGH: Primary | ICD-10-CM

## 2024-11-21 DIAGNOSIS — J38.7 PRESBYLARYNGES: ICD-10-CM

## 2024-11-21 DIAGNOSIS — R49.0 DYSPHONIA: ICD-10-CM

## 2024-11-21 DIAGNOSIS — J38.7 IRRITABLE LARYNX SYNDROME: ICD-10-CM

## 2024-11-21 PROCEDURE — 92524 BEHAVRAL QUALIT ANALYS VOICE: CPT | Mod: GN | Performed by: SPEECH-LANGUAGE PATHOLOGIST

## 2024-11-21 NOTE — Clinical Note
11/21/2024       RE: Cortney Nagy  [address]  [place] [address]     Dear Colleague,    Thank you for referring your patient, Cortney Nagy, to the [facility] VOICE CLINIC [place] [facility]. Please see a copy of my visit note below.    [facility]  Clinical Voice and Upper Airway Evaluation Report    Patient's Name: Cortney Nagy  Date of Evaluation: 11/21/2024  Providing SLP: Shadi Martin, MM, PhD, CF-SLP  Seen in Conjunction With: Vickie Becker (voice), M.S., CCC-SLP  Referring Provider and Facility: DARIA Carter CNP (Pulmonology)  Insurance Coverage: [facility] Medicare Advantage (Certification Dates: 11/21/24 - 2/19/25)  Chief Complaint: Chronic Cough  Evaluation Location: [facility]  Others in Attendance for the Evaluation:   Time of Evaluation: 8472 - 7445      Patient History: Cortney Nagy is a 74 year old-year-old female who presents today for evaluation of chronic cough.     Cortney presents for follow up. She was last seen in clinic in 08/2023. At that time we discussed the likelihood of her PND or GERD contributing to her chronic cough. She was hesitant to start multiple medications at the same time so we decided to have her trial a PPI first. She took the omeprazole daily for 1 month and did not notice a change in cough.   She is willing to try the Flonase and antihistamine now and focus on the PND. She does report OTC Robitussin with a decongestant being helpful.      The cough started years ago (>5 years) she is unsure of the timeline exactly. The cough is dry. She also reports frequent throat clearing, mostly in the morning. The cough frequency and severity seems to wax and wane in severity.   Denies wheeze, chest tightness, chest pain, or hemoptysis.   Denies nasal congestion but will have throat congestion that she can cough up  "and spit out. She saw ENT in [place] many years ago and they did mention there was some drainage in the back of her throat.   She does admit to some seasonal allergy symptoms occasionally but nothing consistent or very obvious.     Can wake her up (3, 5, 6:30); Not during day; Only when lying down              Not every night, random, could be 2 or 10 days  Over 10 years  Takes Rodriguez CRUZ to relieve symptoms  Throat clearing anytime (often in the morning)    Has been managing reflux symptoms since October *** with omeprazole  Cough feels like it comes from chest     Voice: talking on the phone can cause periodic dysphonia \"froggy\"      Laryngeal Review of Systems:     Dysphonia:                  +  Odynophonia:              -  Globus Sensation:      -  Dysphagia:                  -  Odynophagia:              -  Hemoptysis:                -  Otalgia:                        -  Dyspnea:                     -  Stridor:                         -  Wheezing:                   -  Throat Clear:               +  Cough:                         +  Reflux:                         +  Allergies:                     -          Dysphonia: Patient endorses mild voice changes that occur primarily when she is talking on the phone.  They resolve after 1-2 throat clears and drinking water.    Dyspnea: Patient denies any breathing difficulties. She completed a PFT on 8/23/23 with no significant findings.    Dysphagia: Patient denies any swallowing difficulties.  She began management of reflux symptoms in October (per patient report).    Cough / Throat Clearing:   Onset: >10 years ago  Progression: Consistent/Stable  Concerns  More coughing than throat clearing  Wakes her up at night (can wake her up at 3 AM, 5 AM, or 6:30 AM, seems random)  Throat clearing can occur anytime, but mostly occurs in the morning per patient report  Frequency: Every 2-10 days   Triggers: Lying down  Improves with: Nothing    Throat Pain: Patient denies any " "throat pain.    Globus Sensation: Patient denies any globus sensation.    Medical / Surgical History:   Hx of breast cancer (radiation 6/3-6/28/2024)    Other Medical Evaluations, Testing, and Treatments:   Per DARIA Gaytan, CNP (Pulmonology, 10/4/23)    Laryngeal Review of Systems:     Dysphonia:                  +  Odynophonia:              -  Globus Sensation:      -  Dysphagia:                 -  Odynophagia:              -  Hemoptysis:                -  Otalgia:                        -  Dyspnea:                     -  Stridor:                        -  Wheezing:                   -  Throat Clear:              +  Cough:                        +  Reflux:                        +  Allergies:                     -      Quality of Life Questionnaires:    PATIENT REPORTED MEASURES:  Patient did not complete any PROM's.    Perceptual Analysis (38504): Evaluation of Voice / Speech / Non-Communicative Laryngeal Behaviors    The GRBAS is a perceptual rating of voice change. 0 indicates no impairment, 3 indicates a severe impairment. \"C\" and \"I\" may be used to signify if these feature was observed consistently or intermittently respectively. This is a rating based on clinical judgement of disordered voice quality.  G ( 0 ) General Dysphonia     R ( 0 ) Roughness     B ( 0 ) Breathiness     A ( 0 ) Asthenia     S ( 0 ) Strain  Additional information: n/a    Laryngeal palpation:   Thyrohyoid space: Patient endorsed tightness/tenderness upon palpation during Dr. Portillo's exam.    Additional observations:   Cough/ Throat Clear: cough is primary and non productive; throat clears observed following FEES  Breathing patterns: appropriate at rest   Overt tension: \" \"   Habitual pitch: WNL compared to age- and gender-matched peers   Pitch range: \" \"   Resonance: WNL  Loudness: appropriate     Aerodynamic Analysis    ***     Interpretation:  ***  Acoustic findings of *** elevated perturbation features*** are consistent with " perceptual finding of ***  Aerodynamic finding of *** low air flow and *** elevated peak air flow pressure are consistent with patient reported increased effort.      Laryngoscopy with*** stroboscopy:    [facility]    Informed consent: Informed verbal consent was obtained, which includes potential side-effects, risks, and benefits of the procedure.     Anesthetic: {jdganesthetic:624327}    Scope type: A distal chip flexible laryngoscope was passed through the nare with halogen and xenon*** light source(s).    Scope serial number: ***    The laryngeal and pharyngeal structures were evaluated for gross appearance, mobility, function, and focal lesions / abnormalities of the associated mucosa.    Stroboscopic Observations   R Amplitude: {movement ratin} L Amplitude: {movement ratin}   R Mucosal Wave: {movement ratin} L Mucosal Wave:{movement ratin}   Phase Symmetry: {phase symmetry:899507} Periodicity: {periodicity:806450}   Phase Closure: {proportion of closed vibratory cycle:680256} Vertical Level of Approximation: {vertical plane:013102}   Additional Observations: ***       Endoscopic Observations   Appearance: {visual appearance:431171}   Right (R) Vocal Fold Edge: *** color, {appearance of vocal fold edge:683474} Left (L) Vocal Fold Edge: *** color, {appearance of vocal fold edge:614285}   Glottic Closure: {appearance of glottic closure pattern:086820}    R Arytenoid Ab/Adduction: {arytenoid mobility:347545} L Arytenoid Ab/Adduction: {arytenoid mobility:746430}      Mediolateral Compression: {degree of supraglottic hyperfunction:963935} compression Anterior-Posterior Compression: {degree of supraglottic hyperfunction:954338} compression   Narrow Band Imaging: Consistent with halogen light findings***   Additional Observations: ***     Therapeutic Probes: ***  Humming resulted in ***  Flow/high airflow stimuli resulted in ***  Glottal coup  "resulted in ***  Increased volume resulted in ***  Rescue breathing techniques utilizing brisk, nasal inhalation and pursed lip inspiration with prolonged, high pressure exhalation revealed *** maximal vocal fold abduction with maintenance of the glottic airway during exhalation  A coughing attack was triggered during laryngoscopy today, and pulsed \"sh\" resulted in reduced length and severity of the attack    FEES: Evaluation was performed by *** and Dr. Portillo. Impressions from this clinical document indicates: \"***\"    Therapeutic Techniques Attempted (83176 for Individual Speech Therapy):    ***       Impressions and Plan:     Cortney Nagy is a 74 year old-year-old female presenting today with *** secondary to ***. Perceptually, ***. Laryngeal function studies indicated *** which aligns with perceptual findings. Laryngoscopy today demonstrated ***. Therefore, *** has been recommended. ***. *** is in agreement with this plan of care.     RESEARCH: A warm introduction was *** provided regarding participation in laryngology research studies. This patient is *** interested in being contacted.    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 *** is in agreement with this plan of care and plans to check with her insurance about coverage prior to beginning sessions. - Goals: to improve and maintain a healthy voice quality, to understand the problem and fix it as much as possible, report resolution of symptoms, and use of optimal voice quality and comfort to meet personal, social, and professional needs, 90% of the time during a typical week of vocal activities      Goals:    VCD/PVFM  Gradually decrease VCD episodes to increase quality of life and ability to participate in high level cardio activity without limitations  Perform rescue breathing techniques while asymptomatic to improve automatic response when experiencing dyspnea  Perform rescue breathing techniques before and during exercise to prevent dyspnea/severe VCD attack  Perform rescue breathing during exercise or when exposed to a trigger item to resolve a VCD attack or coughing episode  Continue participating in high level cardio activity while performing rescue breathing to resolve dyspnea  Utilize abdominal breathing techniques in targeted activities (e.g. physical exercise, communication, high-level phonation/pitch range navigation exercises, etc.)  Rebalance laryngeal musculature and strengthen inspiratory muscles resulting in decreased laryngeal sensitivity and decreased frequency of VCD episodes.?  Demonstrate appropriate vocal hygiene including, but not limited to, adequate hydration and integrating behavioral and dietary changes for GERD into their daily life.?   Recoordinate respiratory and laryngeal musculature to resume activities of daily living.?   Patient will perform advanced breathing exercises with the respiratory  focusing on inspiratory muscle strengthening with minimal assistance 90% of the time.  Patient will demonstrate abdominal focused breathing technique in targeted exercises with minimal assistance 90% of the time.?   Patient will demonstrate abdominal  focused breathing technique with physical activity with minimal assistance 90% of the time.  Gradually reduce inappropriate and excessive inhaler use  Demonstrate a 50% reduction in Dyspnea Index score  Patient will express satisfaction with their breathing and their ability to participate in social, personal, and work/school functions without limitation    Chronic Cough  Decrease cough in all activities of daily living using compensation strategies  Independently implement a cough suppression strategy when cough trigger is sensed 90% of the time.?   Decrease the number of coughs per day by 50%   Demonstrate increased tolerance of a chemical and/or environmental irritant as evidenced by increased exposure (decreased proximity and/or increased strength) to that irritant by 50%   Demonstrate a 50% reduction in Cough Severity Index score  Patient will express satisfaction with their coughing and their ability to participate in social, personal, and work/school functions without limitation     Voice  Coordinate phonatory and respiratory subsystems, demonstrating adequate independence for activities of daily communication   Decrease extrinsic laryngeal muscle activity during communication events   Improve voice quality in all activities of daily living using, as measured by a minimum of a 50% point reduction on the Voice Handicap Index-10 or Singing VHI  Demonstrate appropriate vocal hygiene including, but not limited to, adequate hydration, avoiding vocal abuse, integrating behavioral and dietary changes for GERD into their daily life?.   Incorporate behaviors to reduce irritation and promote laryngeal healing and prevent recurrence.?   Implement behavioral strategies to reduce laryngopharyngeal reflux. ?   Independently maintain a forward focus voice placement 90% of the time during conversational speech.  Independently perform the Vocal Function Exercises, rebalancing respiration, phonation, and resonance 90% of the  "time  Perform High Level Phonation and Pitch Range Navigation Exercises independently 90% of the time  Patient will express satisfaction with their voice quality and function and their ability to participate in social, personal, and work/school functions without limitation    Perioperative  Adhere to complete vocal rest recommendations in the acute post-operative period  Gradually increase voice use following the complete voice rest period  Adhere to vocal hygiene recommendations including smoking cessation, hydration, avoidance of caffeine and alcohol, and behavioral reflux precautions  Perform rescue breathing techniques several times daily  Incorporate SOVT exercises gradually in the immediate post-operative period   Participate in pre- and post-operative voice therapy      Billed Procedures:    Laryngoscopy without stroboscopy 83810  Laryngoscopy with stroboscopy 52297  Individual speech therapy session 14181  Perceptual voice assessment 95061  Laryngeal function studies 76310 with 52 modifier  Assessment and treatment time: *** minutes  Chart review, interpretation of testing, documentation preparation, etc: *** minutes      Thank you for allowing me to participate in this patient's care,    Shadi Martin M.M., Ph.D., CF-SLP  Clinical Fellow in Voice and Upper Airway Speech-Language Pathology  [facility]  [email or web address]  Pronouns: he/him        Can wake her up (3, 5, 6:30); Not during day; Only when lying down   Not every night, random, could be 2 or 10 days  Over 10 years  Takes Robutusin DM to relieve symptoms  Throat clearing anytime (often in the morning)  Hx of breast cancer (radiation 6/3-6/28/2024)  Has been managing reflux symptoms since October *** with omeprazole  Cough feels like it comes from chest    Voice: talking on the phone can cause periodic dysphonia \"froggy\"     Laryngeal Review of Systems:    Dysphonia:  +  Odynophonia:  -  Globus " Sensation: -  Dysphagia:  -  Odynophagia:  -  Hemoptysis:  -  Otalgia:  ***  Dyspnea:  -  Stridor:   -  Wheezing:  -  Throat Clear:  +  Cough:   +  Reflux:   +  Allergies:  -                  Again, thank you for allowing me to participate in the care of your patient.      Sincerely,    Speech Language Pathologist

## 2024-11-21 NOTE — PROGRESS NOTES
[facility]  Clinical Voice and Upper Airway Evaluation Report    Patient's Name: Cortney Nagy  Date of Evaluation: 11/21/2024  Providing SLP: Shadi Martin MM, PhD, CF-SLP  Seen in Conjunction With: Vickie Becker (voice), M.S., CCC-SLP  Referring Provider and Facility: DARIA Carter CNP (Pulmonology)  Insurance Coverage: [facility] Medicare Advantage (Certification Dates: 11/21/24 - 2/19/25)  Chief Complaint: Chronic Cough  Evaluation Location: [facility]  Others in Attendance for the Evaluation:   Time of Evaluation: 5646 - 8377      Patient History: Cortney Nagy is a 74 year old-year-old female who presents today for evaluation of chronic cough.     Dysphonia: Patient endorses mild voice changes that occur primarily when she is talking on the phone.  They resolve after 1-2 throat clears and drinking water.    Dyspnea: Patient denies any breathing difficulties. She completed a PFT on 8/23/23 with no significant findings.    Dysphagia: Patient denies any swallowing difficulties.  She began management of reflux symptoms in October (per patient report).    Cough / Throat Clearing:   Onset: >10 years ago  Progression: Consistent/Stable  Concerns  More coughing than throat clearing  Wakes her up at night (can wake her up at 3 AM, 5 AM, or 6:30 AM, seems random)  Throat clearing can occur anytime, but mostly occurs in the morning per patient report  Frequency: Every 2-10 days   Triggers: Lying down  Improves with: Nothing    Throat Pain: Patient denies any throat pain.    Globus Sensation: Patient denies any globus sensation.    Medical / Surgical History:   Hx of breast cancer (radiation 6/3-6/28/2024)    Other Medical Evaluations, Testing, and Treatments:   Per DARIA Gaytan, CNP (Pulmonology, 10/4/23)    Laryngeal Review of Systems:     Dysphonia:                  +  Odynophonia:              -  Globus Sensation:       "-  Dysphagia:                 -  Odynophagia:              -  Hemoptysis:                -  Otalgia:                        -  Dyspnea:                     -  Stridor:                        -  Wheezing:                   -  Throat Clear:              +  Cough:                        +  Reflux:                        +  Allergies:                     -      Quality of Life Questionnaires:    PATIENT REPORTED MEASURES:  Patient did not complete any PROM's.    Perceptual Analysis (17272): Evaluation of Voice / Speech / Non-Communicative Laryngeal Behaviors    The GRBAS is a perceptual rating of voice change. 0 indicates no impairment, 3 indicates a severe impairment. \"C\" and \"I\" may be used to signify if these feature was observed consistently or intermittently respectively. This is a rating based on clinical judgement of disordered voice quality.  G ( 0 ) General Dysphonia     R ( 0 ) Roughness     B ( 0 ) Breathiness     A ( 0 ) Asthenia     S ( 0 ) Strain  Additional information: n/a    Laryngeal palpation:   Thyrohyoid space: Patient endorsed tightness/tenderness upon palpation during Dr. Portillo's exam.    Additional observations:   Cough/ Throat Clear: cough is primary and non productive; throat clears observed following FEES  Breathing patterns: appropriate at rest   Overt tension: \" \"   Habitual pitch: WNL compared to age- and gender-matched peers   Pitch range: \" \"   Resonance: WNL  Loudness: appropriate     Laryngoscopy without stroboscopy:    Provider performing exam: Dr. Toyin Portillo    Informed consent: Informed verbal consent was obtained, which includes potential side-effects, risks, and benefits of the procedure.     Anesthetic: 0.025% oxymetazoline was sprayed into the nasal cavity bilaterally. Viscous lidocaine 4% was applied to the tip of the endoscope. The scope was passed through the right nostril.    Scope type: A distal chip flexible laryngoscope was passed through the nare with halogen light " "source(s).    The laryngeal and pharyngeal structures were evaluated for gross appearance, mobility, function, and focal lesions / abnormalities of the associated mucosa.    Endoscopic Observations   Appearance: Cobblestoning of the posterior nasopharynx appreciated; Clear, thick mucus coating the posterior pharyngeal wall;    Right (R) Vocal Fold Edge: white color, mild bowing along the length of the vocal fold Left (L) Vocal Fold Edge: white color, mild bowing along the length of the vocal fold   Glottic Closure: complete    R Arytenoid Ab/Adduction: Normal L Arytenoid Ab/Adduction: Normal      Mediolateral Compression: Moderate-Severe compression (R>L) Anterior-Posterior Compression: Mild-Moderate compression   Narrow Band Imaging: Consistent with halogen light findings   Additional Observations: none     Therapeutic Probes:   No therapeutic probes were attempted d/t lack of tolerance of the procedure.    FEES: Evaluation was performed by Bijan Lopez, CCC-SLP and Dr. Portillo. Impressions from this clinical document indicates: \"Overall, pt presents with a safe, functional oropharyngeal swallow. 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 Perceptually, she demonstrated no dysphonia. Laryngoscopy today demonstrated mild vocal fold bowing bilaterally, with moderate-severe supraglottic hyperfunction. Therefore, voice therapy has been recommended. Voice therapy sessions should begin by focusing on cough replacement strategies before addressing the patient's voice concerns related to prebylaryngis. Dr. Portillo has also ordered a GI referral for esophageal etiology work up to chronic cough at night and a sleep evaluation for snoring. Ms. Nagy is in agreement with this plan of care.     RESEARCH: A warm introduction was  " provided regarding participation in laryngology research studies. This patient is interested in being contacted.    Goals:    Chronic Cough  Decrease cough in all activities of daily living using compensation strategies  Independently implement a cough suppression strategy when cough trigger is sensed 90% of the time.?   Decrease the number of coughs per day by 50%   Patient will express satisfaction with their coughing and their ability to participate in social, personal, and work/school functions without limitation     Voice  Coordinate phonatory and respiratory subsystems, demonstrating adequate independence for activities of daily communication   Patient will express satisfaction with their voice quality and function and their ability to participate in social, personal, and work/school functions without limitation    Billed Procedures:    Perceptual voice assessment 18797  Assessment and treatment time: 49 minutes  Chart review, interpretation of testing, documentation preparation, etc: 15 minutes    Thank you for allowing me to participate in this patient's care,    Shadi Martin M.M., Ph.D., CF-SLP  Clinical Fellow in Voice and Upper Airway Speech-Language Pathology  [facility] [email or web address]  Pronouns: he/him

## 2024-11-21 NOTE — PROGRESS NOTES
"Can wake her up (3, 5, 6:30); Not during day; Only when lying down   Not every night, random, could be 2 or 10 days  Over 10 years  Takes Rodriguez DM to relieve symptoms  Throat clearing anytime (often in the morning)  Hx of breast cancer (radiation 6/3-6/28/2024)  Has been managing reflux symptoms since October *** with omeprazole  Cough feels like it comes from chest    Voice: talking on the phone can cause periodic dysphonia \"froggy\"     Laryngeal Review of Systems:    Dysphonia:  +  Odynophonia:  -  Globus Sensation: -  Dysphagia:  -  Odynophagia:  -  Hemoptysis:  -  Otalgia:  ***  Dyspnea:  -  Stridor:   -  Wheezing:  -  Throat Clear:  +  Cough:   +  Reflux:   +  Allergies:  -              "

## 2024-11-21 NOTE — PATIENT INSTRUCTIONS
[email or web address]         Laryngopharyngeal Reflux Disease    What is Laryngopharyngeal Reflux Disease (LPRD)  Gastroesophageal Reflux Disease (GERD) is a well-known problem in this [place].  GERD occurs when stomach acid makes its way back up the esophagus, causing indigestion, stomach upset, and heartburn. In some cases the acid may travel all the way up the esophagus, and spill over into the larynx (voice box) and pharynx (back of your throat) resulting in what is then called Laryngo Pharyngeal Reflux Disease (LPRD). This is especially common during sleep, when the individual is lying down, and acid does not have to fight gravity to move up the esophagus.  However, it can also affect individuals while awake.      What are the symptoms of Laryngopharyngeal Reflux Disease  LPRD symptoms vary from person to person, but may include:   a dry, choking sensation, especially during the night  a sour taste in your mouth upon waking up  a raw, burning sensation in the throat  a sensation of a lump in the throat  pain in the throat, neck, or running from the back of the chin along the neck  frequent coughing or desire to clear the throat  Changes to voice quality  worst in the morning and improving over the day  quality that deteriorates quickly with use  frequently described as: gritty, rough, hoarse    Some individuals with LPRD may also experience esophageal and stomach related symptoms such as heartburn and indigestion.  However, others may experience no such  classic  reflux symptoms at all. The reason for these differences is that while our esophagus is equipped to handle small amounts of reflux, the tissues of the larynx and pharynx are not. In this case it's like a burglar sneaks past the normal security guards only to set off the alarm when they open the safe.     How is Laryngopharyngeal Reflux Disease evaluated?  At the [facility], evaluations of any of the symptoms listed above include a  thorough evaluation by a multidisciplinary team of a Laryngologist and Speech Language Pathologist (SLP) including visualizing the larynx with Nasal Endoscopy.  There may be signs during that evaluation that point toward LPRD playing a role in an individual's symptoms such as redness, irritation, or even ulceration. However, while the larynx and pharynx may be affected in the case of LPRD, the stomach is the actual origin of the reflux.  As a result, ultimate diagnosis and long term management of reflux in any form should be in the hands of the stomach specialists in Gastroenterology (GI).  Evaluation with GI may include an upper endoscopy to evaluate the esophagus and stomach, or even probes which assess the presence of reflux over several days.      How is Laryngopharyngeal Reflux Disease treated?   Within our practice your Laryngologist may recommend a course of anti-reflux medication based on your symptoms and evaluation.  This is known as empiric treatment, and it is very reasonable and even prudent for short-term management.  Additionally, should functional speech therapy be recommended, your Speech Language Pathologist (SLP) may recommend strategies to reduce voice and throat symptoms, optimize the health of your larynx and pharynx, and even some basic healthy changes that can reduce reflux in some individuals. However, for long-term management strategies it is important to maintain contact with your GI care team.

## 2024-11-21 NOTE — PROGRESS NOTES
SPEECH LANGUAGE PATHOLOGY EVALUATION       Fall Risk Screen:  Fall screen completed by: SLP  Have you fallen 2 or more times in the past year?: No  Have you fallen and had an injury in the past year?: No  Is patient a fall risk?: No    Subjective        Presenting condition or subjective complaint:   Patient is a 74-year-old female who was seen today in conjunction with ENT multidisciplinary clinic for a clinical swallow evaluation visualized under endoscopy.  Patient was referred by pulmonology for chronic cough.  Patient reports that she wakes up from coughing in the middle of the night.  She also reports frequent coughing that she is lying down and getting ready to go to bed.  She also endorses throat clearing but only in the morning.  Patient reports that when she is coughing she will take Robitussin and this will immediately soothe her cough.  Patient was prescribed omeprazole by the pulmonologist and has been taking it since October 8.  However, she does not feel that this has made much difference to her coughing.  Otherwise, patient has no reports of difficulty swallowing.  She denies that food is sticking in her throat.  She denies coughing with eating or drinking.  No history of GI.  Date of onset: 11/21/24    Relevant medical history:   Breast Cancer- Radiation Treatment from 6/3/24-6/28/24, anemia, anxiety, Cerebral aneurysm, nonruptured, depression  Dates & types of surgery:   Partial left mastectomy with sentinel node    Prior diagnostic imaging/testing results:     MRI, PFT- normal (8/23/23)  Prior therapy history for the same diagnosis, illness or injury:        Living Environment  Social support:     Help at home:    Equipment owned:       Employment:      Hobbies/Interests:      Patient goals for therapy:      Pain assessment: Pain denied     Objective     SWALLOW EVALUTION  Dysphagia history: No prior history of dysphagia  Current Diet/Method of Nutritional Intake: thin liquids (level 0), regular  diet        CLINICAL SWALLOW EVALUATION- visualized under endoscopy  Oral Motor Function: Dentition: adequate dentition  Secretion management: WFL  Mucosal quality: adequate  Mandibular function: intact  Oral labial function: WFL  Lingual function: WFL  Velar function: intact   Buccal function: intact  Laryngeal function: cough, throat clear, volitional swallow, voicing WFL     Level of assist required for feeding: no assistance needed   Textures Trialed:   Clinical Swallow Eval: Thin Liquids  Mode of presentation: straw   Volume presented: 4oz  Preparatory Phase: WFL  Oral Phase: WFL  Pharyngeal phase of swallow: intact   Diagnostic statement: PO trials evaluated under endoscopy completed by MD. No penetration/aspiration or significant residuals.     Clinical Swallow Eval: Purees  Mode of presentation: spoon   Volume presented: 3 tablespoons  Preparatory Phase: WFL  Oral Phase: WFL  Pharyngeal phase of swallow: intact   Diagnostic statement: PO trials evaluated under endoscopy completed by MD. No penetration/aspiration or significant residuals.     Clinical Swallow Eval: Solids  Mode of presentation: self-fed   Volume presented: 1 Rahel doone  Preparatory Phase: WFL  Oral Phase: WFL  Pharyngeal phase of swallow: intact   Diagnostic statement: PO trials evaluated under endoscopy completed by MD. No penetration/aspiration or significant residuals.       ADDITIONAL EVAL COMPLETED TODAY : yes; rationale: None    ESOPHAGEAL PHASE OF SWALLOW  no observed or reported concerns related to esophageal function     SWALLOW ASSESSMENT CLINICAL IMPRESSIONS AND RATIONALE  Diet Consistency Recommendations: thin liquids (level 0), regular diet    Recommended Feeding/Eating Techniques: slow rate of intake, alternate food and liquid intake, maintain upright posture during/after eating for 30 minutes   Medication Administration Recommendations: Pills with water  Instrumental Assessment Recommendations:  none      Assessment & Plan    CLINICAL IMPRESSIONS   Medical Diagnosis: Chronic cough    Treatment Diagnosis: Safe, functional oropharyngeal swallow   Impression/Assessment: Overall, pt presents with a safe, functional oropharyngeal swallow. Oral motor assessment demonstrates adequate lingual, labial and buccal strength. Oral phase demonstrates adequate AP transit of bolus and adequate bolus manipulation. Pharyngeal phase demonstrates a timely swallow response, adequate base of tongue retraction and pharyngeal constriction. No penetration or aspiration was observed today on any trials. Pt did not have significant pharyngeal residue that would indicate weakness. Recommend Pt continue with a regular diet with thin liquids. Pt was educated on the anatomy and the results of the swallow assessment. Pt was educated on recommendations and agrees with the plan of care.        PLAN OF CARE  Treatment Interventions:  eval only    Recommended Referrals to Other Professionals:  GI  Education Assessment:   Learner/Method: Patient;Listening;Reading;Pictures/Video;No Barriers to Learning    Risks and benefits of evaluation/treatment have been explained.   Patient/Family/caregiver agrees with Plan of Care.     Evaluation Time:    SLP Eval: oral/pharyngeal swallow function, clinical minutes (43336): 45    Evaluation Only     Signing Clinician: Leonor LIU [facility]                                                                                   OUTPATIENT SPEECH LANGUAGE PATHOLOGY

## 2024-12-11 ENCOUNTER — VIRTUAL VISIT (OUTPATIENT)
Dept: OTOLARYNGOLOGY | Facility: CLINIC | Age: 74
End: 2024-12-11
Payer: COMMERCIAL

## 2024-12-11 DIAGNOSIS — J38.7 PRESBYLARYNGES: ICD-10-CM

## 2024-12-11 DIAGNOSIS — R49.0 DYSPHONIA: ICD-10-CM

## 2024-12-11 DIAGNOSIS — J38.7 IRRITABLE LARYNX SYNDROME: ICD-10-CM

## 2024-12-11 DIAGNOSIS — R05.3 CHRONIC COUGH: Primary | ICD-10-CM

## 2024-12-11 NOTE — LETTER
12/11/2024       RE: Cortney Nagy  [address] [place] [address]     Dear Colleague,    Thank you for referring your patient, Cortney Nagy, to the [facility] [place] [facility]. Please see a copy of my visit note below.    Cortney Nagy is a 74 year old female who is being evaluated via a billable video visit.      Cortney has been notified and verbally consented to the following:   This video visit will be conducted between you and your provider.  Patient has opted to conduct today's video visit vs an in-person appointment.   Video visits are billed at different rates depending on your insurance coverage. Please reach out to your insurance provider with any questions.   If during the course of the call the provider feels the appointment is not appropriate, you will not be charged for this service.  Provider has received verbal consent for billable virtual visit from the patient? Yes  Will anyone else be joining your video visit? No    Call initiated at: 1002   Type of Visit Platform Used: [employer]  Location of provider: Home  Location of patient: [facility]  PROGRESS REPORT (CPT 46289)    Patient: Cortney Nagy  Date of Service: 12/11/2024  Date of Last Service: 11/21/2024  Number of Visits: 2 visits / 1 therapy  Certification Dates: [facility] Medicare Advantage  Certification Begins:11/21/24  Certification Ends: 2/19/25   Referring Physician: Dr. 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 Perceptually, she demonstrated no dysphonia. Laryngoscopy today demonstrated mild vocal fold bowing bilaterally, with moderate-severe supraglottic  hyperfunction. Therefore, voice therapy has been recommended. Voice therapy sessions should begin by focusing on cough replacement strategies before addressing the patient's voice concerns related to prebylaryngis. Dr. Portillo has also ordered a GI referral for esophageal etiology work up to chronic cough at night and a sleep evaluation for snoring.     SUBJECTIVE:  Since Cortney's last session, she reports the following:   Patient reports that her cough symptoms have been slightly improved in the interval since her last initial evaluation  Cough drops have been useful (Pectin-based, [place])  She slept through the night for the first time in a while last night  She notices changes in her singing voice  She is now completing lymphedema PT.    OBJECTIVE:  Ms. Nagy presents today with the following:  Voice Quality:  Speaking voice:  Roughness: Mild (Intermittent)  Breathiness: WNL  Strain: WNL  Pitch: WNL  Loudness: WNL    Patient Specific Goal Metrics:      12/11/2024    10:00 AM   Dysponia SLP Goals   How severe is your cough /throat clearing, if 0 is no cough at all and 10 is the worst cough? 3   How much does your cough/throat-clearing problem bother you?            A little bit       THERAPEUTIC ACTIVITIES  Today Cortney participated in the following therapeutic activities:  Counseling and Education:  Asked many questions about the nature of her symptoms, and I answered all of these thoroughly.  We discussed Dr. Portillo's referral to GI and Sleep specialist  Provided education related to chronic cough.    I provided Cortney with explanation and skilled instruction of:    The patient was instructed on cough replacement strategies to reduce the frequency and severity of cough/throat clearing episodes.  The patient used the following cough replacement strategies with benefit:  Sipping water  The patient completed this strategy with a high degree of accuracy.  The patient observed that this strategy has been helpful in  "the past.  Coco (Snudeep)  The patient stated that this strategy has been facilitating in the past.  She did not trial this strategy in today's session.  Gargle phonation  That patient completed this strategy with moderate accuracy.  The patient remarked, \"Oh yeah, I could see myself doing that!\"  Overall, the patient observed that Sipping water was most facilitating for decreased urge to cough.    Instruction of Home Practice:  A revised home exercise plan (HEP) was collaboratively designed and instructed.  The patient confirmed that the HEP was reasonable and that it seemed attainable for daily practice.  I provided an AVS of important notes of today's therapeutic activities to facilitate practice.    ASSESSMENT/PLAN  Progress toward long-term goals:  Minimal at this point, as this is first session, but good learning today    Impressions:  IMPRESSIONS:    Ms. Nagy is a 74 y.o. 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 Cortney had a productive session of therapy today, working on cough replacement strategies and education related to ILS.  She will continue to work on her exercises on a daily basis, and work on incorporating the techniques into her daily activities. Speech therapy continues to be medically necessary for Cortney to participate fully and engage in activities of daily living.     Plan:   I will see Cortney in 2 weeks (12/23/24) and will plan to assess the efficacy of her cough replacement strategies and adjust as needed. She would like to work on her voice in subsequent sessions.    For home practice goals, see AVS.     TOTAL SERVICE TIME:   Call Initiated at: 1002  Call Ended at: [phone]  TREATMENT (63654)    CPT Billing Codes:   TREATMENT OF SPEECH, LANGUAGE, VOICE, COMMUNICATION, and/or AUDITORY PROCESSING DISORDER (44030)    Thank you for allowing me to participate in this " patient's care,    Shadi Martin M.M., Ph.D., CF-SLP  Clinical Fellow in Voice and Upper Airway Speech-Language Pathology  [facility]  [email or web address]  Pronouns: he/him    OBJECTIVE FINDINGS  PATIENT REPORTED MEASURES  Patient Supplied Answers To Last 2 VHI Questionnaires       No data to display              Patient Supplied Answers To Last 2 CSI Questionnaires       No data to display                   Patient Supplied Answers To Last 2 EAT Questionnaires       No data to display                  Patient Supplied Answers to Dyspnea Index Questionnaire:       No data to display                   Attestation signed by Victor M Hilton, SLP at 12/12/2024  3:50 PM:  I attest that these services performed under my supervision, and I agree with the information contained in within this note.    Victor M Hilton M.M., M.A., CCC-SLP  Speech-Language Pathologist  Certificate of Vocology  [phone]      Again, thank you for allowing me to participate in the care of your patient.      Sincerely,    Shadi Martin, SLP

## 2024-12-11 NOTE — PATIENT INSTRUCTIONS
It was joelle working with you today!  Here are some callaway takeaways about our session today and some information about Irritable Larynx Syndrome:    1) Whenever you feel the need to cough, try replacing it with sipping water, sucking on a lozenge, or gargling with your voice on.    2) Be sure to follow Dr. Portillo's recommendation of scheduling evaluations with GI and Sleep specialists since reflux and sleep problems may be contributing to your chronic cough.    3) [place] with your cough replacement strategies so you can tell me what (1) works, (2) works best, and (3) doesn't work at all.    Here is some information about Irritable Larynx Syndrome:        Irritable Larynx Syndrome    What is Irritable Larynx Syndrome?  Irritable Larynx Syndrome (ILS) is a cluster of symptoms not associated with a specific disease process, but felt to be related to the sensitivity / reactivity of the larynx (voice box) and surrounding structures. It can frequently present alongside other issues such as voice, swallowing, or even breathing changes. Individuals with ILS can have any combination of the following complaints:        Chronic cough  Chronic throat clearing or persistent sensation of mucus in the throat  Globus sensation (feeling of lump or some other sensation in the throat)  Throat discomfort, irritation, or burning sensation  Paradoxical vocal fold motion (sensation of difficulty inhaling)  Laryngospasm (tightening of throat causing choking or difficulty inhaling)    What causes Irritable Larynx Syndrome?  The reason why ILS develops for some individuals and not others is unknown; however, we know that it is usually fed by a vicious cycle of irritation. Chronic irritation of the surface tissue in the larynx and throat can cause changes to the nerves; making them hyper-sensitive.  As the cycle progresses, even a small irritation like a bit of dust or strong fragrance can cause a large response (like a cough or breathing  difficulty). It's nice that the nervous system can learn, but in this case it has backfired!  To put it another way, ILS works similarly to a mosquito bite: We might scratch the bite absentmindedly a couple of times, and suddenly we realize the bite really itches!  So we scratch it vigorously because that feels better for a moment, but in the long run, scratching can actually irritate the skin and can make it itch more.  In the case of ILS someone may experience an irritation in their throat and not even realize they have begun to  scratch the itch  by clearing their throat, but over time the irritation becomes worse and more noticeable.    As symptoms progress individuals with ILS may be triggered by any number of things including:   Smoke / cigarettes  Certain foods (such as acidity or strong flavors)  Talking   Strong smells / cleaning chemicals  Strong emotions  Changes in temperature    How is Irritable Larynx Syndrome Evaluated?    ILS symptoms are often multifactorial. Our larynx lives at the crossroads of breathing, voice, and swallowing in our body, and so evaluating and treating irritation that contributes to Irritable Larynx Syndrome may also involve other disciplines of medicine beyond ENT. Lung, Allergy, and Gastroenterology (GI) may all need to be involved to give you the care you need.    At the [facility], a laryngologist and a speech-language pathologist work as a team to determine if ILS is a problem.  Medical evaluation and laryngeal examination rule out any other laryngeal causes for the symptoms.  Functional evaluation determines whether there are behavioral or lifestyle factors that are contributing to the symptoms.      How is Irritable Larynx Syndrome Treated?  Treatment of ILS addresses the cause of irritation. This can include:   Treatment of Acid Reflux This may include a trial of medication recommended by the physician, and or a referral to Gastroenterology (GI)  Functional Speech  Therapy with a Speech-Language Pathologist (SLP). Your SLP will educate you about the disorder, help you adjust your external environment, take care of your internal environment, and optimize patterns in how you use your larynx to reduce irritation and restore balanced function. It may sound mysterious, but it's remarkably effective!  Further Medical treatment - as previously mentioned further evaluation is sometimes needed to identify or rule out other issues contributing to the irritation.  Your MD will discuss these possibilities with you if they are relevant.      So I should never cough?    Coughing serves an important biological function of keeping things out of our airway that don't belong there. Addressing Irritable larynx doesn't mean you aren't allowed to cough, or even clear your throat again.  The work you do with your speech pathologist will help you become more aware of when a cough is needed and when it is not.  You'll learn techniques to help address the urge, and suppress the cough or otherwise do so with less trauma to the tissue.  Although the techniques themselves may seem simple, it is the guidance and feedback you get during your sessions that will make them a powerful tool toward addressing ILS.            Please feel free to reach out if you have any questions!      Дмитрий Howard M.M., Ph.D., CF-SLP  Clinical Fellow in Voice and Upper Airway Speech-Language Pathology  [email or web address]  Pronouns: he/him

## 2024-12-11 NOTE — PROGRESS NOTES
Cortney Nagy is a 74 year old female who is being evaluated via a billable video visit.      Cortney has been notified and verbally consented to the following:   This video visit will be conducted between you and your provider.  Patient has opted to conduct today's video visit vs an in-person appointment.   Video visits are billed at different rates depending on your insurance coverage. Please reach out to your insurance provider with any questions.   If during the course of the call the provider feels the appointment is not appropriate, you will not be charged for this service.  Provider has received verbal consent for billable virtual visit from the patient? Yes  Will anyone else be joining your video visit? No    Call initiated at: 1002   Type of Visit Platform Used: [employer] Video  Location of provider: Home  Location of patient: [facility]  PROGRESS REPORT (CPT 41100)    Patient: Cortney Nagy  Date of Service: 12/11/2024  Date of Last Service: 11/21/2024  Number of Visits: 2 visits / 1 therapy  Certification Dates: BCBS Medicare Advantage  Certification Begins:11/21/24  Certification Ends: 2/19/25   Referring Physician: Dr. 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 Perceptually, she demonstrated no dysphonia. Laryngoscopy today demonstrated mild vocal fold bowing bilaterally, with moderate-severe supraglottic hyperfunction. Therefore, voice therapy has been recommended. Voice therapy sessions should begin by focusing on cough replacement strategies before addressing the patient's voice concerns related to prebylaryngis. Dr. Portillo has also ordered a GI referral for esophageal etiology work up to chronic cough at night and a  "sleep evaluation for snoring.     SUBJECTIVE:  Since Cortney's last session, she reports the following:   Patient reports that her cough symptoms have been slightly improved in the interval since her last initial evaluation  Cough drops have been useful (Pectin-based, Ludens)  She slept through the night for the first time in a while last night  She notices changes in her singing voice  She is now completing lymphedema PT.    OBJECTIVE:  Ms. Nagy presents today with the following:  Voice Quality:  Speaking voice:  Roughness: Mild (Intermittent)  Breathiness: WNL  Strain: WNL  Pitch: WNL  Loudness: WNL    Patient Specific Goal Metrics:      12/11/2024    10:00 AM   Dysponia SLP Goals   How severe is your cough /throat clearing, if 0 is no cough at all and 10 is the worst cough? 3   How much does your cough/throat-clearing problem bother you?            A little bit       THERAPEUTIC ACTIVITIES  Today Cortney participated in the following therapeutic activities:  Counseling and Education:  Asked many questions about the nature of her symptoms, and I answered all of these thoroughly.  We discussed Dr. Portillo's referral to GI and Sleep specialist  Provided education related to chronic cough.    I provided Cortney with explanation and skilled instruction of:    The patient was instructed on cough replacement strategies to reduce the frequency and severity of cough/throat clearing episodes.  The patient used the following cough replacement strategies with benefit:  Sipping water  The patient completed this strategy with a high degree of accuracy.  The patient observed that this strategy has been helpful in the past.  Lozenges (Ludens)  The patient stated that this strategy has been facilitating in the past.  She did not trial this strategy in today's session.  Gargle phonation  That patient completed this strategy with moderate accuracy.  The patient remarked, \"Oh yeah, I could see myself doing that!\"  Overall, the " patient observed that Sipping water was most facilitating for decreased urge to cough.    Instruction of Home Practice:  A revised home exercise plan (HEP) was collaboratively designed and instructed.  The patient confirmed that the HEP was reasonable and that it seemed attainable for daily practice.  I provided an AVS of important notes of today's therapeutic activities to facilitate practice.    ASSESSMENT/PLAN  Progress toward long-term goals:  Minimal at this point, as this is first session, but good learning today    Impressions:  IMPRESSIONS:    Ms. Nagy is a 74 y.o. 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 Cortney had a productive session of therapy today, working on cough replacement strategies and education related to ILS.  She will continue to work on her exercises on a daily basis, and work on incorporating the techniques into her daily activities. Speech therapy continues to be medically necessary for Cortney to participate fully and engage in activities of daily living.     Plan:   I will see Cortney in 2 weeks (12/23/24) and will plan to assess the efficacy of her cough replacement strategies and adjust as needed. She would like to work on her voice in subsequent sessions.    For home practice goals, see AVS.     TOTAL SERVICE TIME:   Call Initiated at: 1002  Call Ended at: [phone]  TREATMENT (89792)    CPT Billing Codes:   TREATMENT OF SPEECH, LANGUAGE, VOICE, COMMUNICATION, and/or AUDITORY PROCESSING DISORDER (68144)    Thank you for allowing me to participate in this patient's care,    Shadi Martin M.M., Ph.D., CF-SLP  Clinical Fellow in Voice and Upper Airway Speech-Language Pathology  [facility]  [email or web address]  Pronouns: he/him    OBJECTIVE FINDINGS  PATIENT REPORTED MEASURES  Patient Supplied Answers To Last 2 VHI  Questionnaires       No data to display              Patient Supplied Answers To Last 2 CSI Questionnaires       No data to display                   Patient Supplied Answers To Last 2 EAT Questionnaires       No data to display                  Patient Supplied Answers to Dyspnea Index Questionnaire:       No data to display

## 2024-12-17 ENCOUNTER — TELEPHONE (OUTPATIENT)
Dept: GASTROENTEROLOGY | Facility: CLINIC | Age: 74
End: 2024-12-17
Payer: COMMERCIAL

## 2024-12-17 NOTE — TELEPHONE ENCOUNTER
REFERRAL INFORMATION:  Referring Provider:  Dr. Toyin Portillo  Referring Clinic:  [facility] - ENT  Reason for Visit/Diagnosis: Chronic Cough, Snoring     FUTURE VISIT INFORMATION:  Appointment Date: 3/4/2025  Appointment Time: 10:15 AM     NOTES STATUS DETAILS   OFFICE NOTE from Referring Provider Internal [facility]:  11/21/24 - ENT OV with Dr. Portillo   OFFICE NOTE from Other Specialist [facility]:  12/11/24 - SPEECH OV with SHERI [place] [facility]:  12/4/23, 10/4/23 - PULM OV with Betina Sandy NP   HOSPITAL DISCHARGE SUMMARY/  ED VISITS N/A    OPERATIVE REPORT N/A    MEDICATION LIST Internal         COLONOSCOPY Received [facility]:  8/16/18 - Colonoscopy   PERTINENT LABS Care Everywhere / Internal

## 2024-12-23 ENCOUNTER — VIRTUAL VISIT (OUTPATIENT)
Dept: OTOLARYNGOLOGY | Facility: CLINIC | Age: 74
End: 2024-12-23
Payer: COMMERCIAL

## 2024-12-23 DIAGNOSIS — J38.7 IRRITABLE LARYNX SYNDROME: ICD-10-CM

## 2024-12-23 DIAGNOSIS — R49.0 DYSPHONIA: Primary | ICD-10-CM

## 2024-12-23 DIAGNOSIS — R05.3 CHRONIC COUGH: ICD-10-CM

## 2024-12-23 DIAGNOSIS — J38.7 PRESBYLARYNGES: ICD-10-CM

## 2024-12-23 NOTE — PATIENT INSTRUCTIONS
"It was joelle working with you today!  Here are some callaway takeaways from our session:    1) Try using a humidifier at night to reduce dryness when you wake.    2) Try using straw bubbles once in the morning and once at night:    Straw phonation with water resistance  1) Using a straw and glass with water, blow bubbles while using your voice.  2) Sustain one pitch in the middle of your voice for approximately 5-8 seconds. - Repeat 3 times.    3) Blow bubbles into a cup and move your voice up and down in pitch (keep this exercise in the middle of your voice--not too high, not too low). - Repeat 3 times.    4) Blow bubbles into a cup while singing a song (any song).    Use the straw bubbles to \"reset\" your voice whenever you need to do so.    Please don't hesitate to reach out if you have any questions!    Lee,    Дмитрий Martin M.M., Ph.D., CF-SLP  Clinical Fellow in Voice and Upper Airway Speech-Language Pathology  [facility]  [email or web address]  Pronouns: he/him      "

## 2024-12-23 NOTE — PROGRESS NOTES
Cortney Nagy is a 74 year old female who is being evaluated via a billable video visit.      Cortney has been notified and verbally consented to the following:   This video visit will be conducted between you and your provider.  Patient has opted to conduct today's video visit vs an in-person appointment.   Video visits are billed at different rates depending on your insurance coverage. Please reach out to your insurance provider with any questions.   If during the course of the call the provider feels the appointment is not appropriate, you will not be charged for this service.  Provider has received verbal consent for billable virtual visit from the patient? Yes  Will anyone else be joining your video visit? No    Call initiated at: 1058   Type of Visit Platform Used: [employer] Video  Location of provider: Work  Location of patient: [facility] VOICE CLINIC  PROGRESS REPORT (CPT 40755)    Patient: Cortney Nagy  Date of Service: 12/23/2024  Date of Last Service: 12/11/2024  Number of Visits: 3 visits / 2 therapy  Certification Dates: BCBS Medicare Advantage  Certification Begins:11/21/24  Certification Ends: 2/19/25   Referring Physician: Dr. 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 Perceptually, she demonstrated no dysphonia. Laryngoscopy today demonstrated mild vocal fold bowing bilaterally, with moderate-severe supraglottic hyperfunction. Therefore, voice therapy has been recommended. Voice therapy sessions should begin by focusing on cough replacement strategies before addressing the patient's voice concerns related to prebylaryngis. Dr. Portillo has also ordered a GI referral for esophageal etiology work up to chronic cough at night and a  sleep evaluation for snoring.     SUBJECTIVE:  Since Cortney's last session, she reports the following:   Patient reports that her cough/throat clear symptoms have been slightly improved in the interval since her last appointment  Cough/throat clear replacement strategies have been helpful per patient report.  She reports feeling dry in the morning.  She has been using Reflux Gourmet, but does not know if it is helping.    OBJECTIVE:  Ms. Nagy presents today with the following:  Voice Quality:  Speaking voice:  Roughness: WNL  Breathiness: WNL  Strain: WNL  Pitch: WNL  Loudness: WNL    Patient Specific Goal Metrics:      12/11/2024    10:00 AM 12/23/2024    11:00 AM   Dysponia SLP Goals   How severe is your cough /throat clearing, if 0 is no cough at all and 10 is the worst cough? 3 2   How much does your cough/throat-clearing problem bother you?            A little bit A little bit       THERAPEUTIC ACTIVITIES  Today Cortney participated in the following therapeutic activities:  Counseling and Education:  Asked many questions about the nature of her symptoms, and I answered all of these thoroughly.  Provided education related to chronic cough.  Provided education related to reflux.  Provided education related to humidification and systemic hydration.    I provided Cortney with explanation and skilled instruction of:    The patient was instructed on the use of semi-occluded vocal tract exercises to decrease vocal strain, increase vocal tract dilation, and improve respiratory-phonatory coordination.  The patient used straw phonation with water resistance with unstructured sustained phonation, ascending pitch glides, descending pitch glides, and ascending and descending pitch glides with minimal verbal and modeling cues.  Trials to use this task as a preparatory gesture at the word-level were not facilitating.  All versions of this task were completed 3 times.  The patient reported that she didn't feel any urge to  cough or throat clear after completing this exercise.  The patient used straw phonation with water resistance in the context of skilled voice use with minimal verbal cues.  The patient demonstrated moderately increased vocal clarity and decreased vocal effort in speaking voice following use of this exercise.    Instruction of Home Practice:  A revised home exercise plan (HEP) was collaboratively designed and instructed.  The patient confirmed that the HEP was reasonable and that it seemed attainable for daily practice.  I provided an AVS of important notes of today's therapeutic activities to facilitate practice.    ASSESSMENT/PLAN  Progress toward long-term goals:  Adequate but incomplete progress; please see above    Impressions:  IMPRESSIONS:    Ms. Nagy is a 74 y.o. 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 Cortney demonstrates reduced cough and throat clear symptoms, and notes that she sometimes experiences increased hoarseness when speaking on the phone with her sister.  As such, today's session focused on using SOVTE's to both reduce cough/throat clear symptoms and to improve respiratory-phonatory coordination. The patient endorsed benefit of this therapeutic exercise and stated that she experienced fairly immediate improvement in voice quality.  She will continue to work on her exercises on a daily basis, and work on incorporating the techniques into her daily activities. Speech therapy continues to be medically necessary for Cortney to participate fully and engage in activities of daily living.     Plan:   I will see Cortney in 4 weeks (1/22/25) and will plan to assess the efficacy of her cough replacement strategies and adjust as needed. We have discussed the possibility of discharge from voice therapy if the improvements experienced today consistently generalize across activities of  daily living.    For home practice goals, see AVS.     TOTAL SERVICE TIME:   Call Initiated at: 1058  Call Ended at: [phone]  TREATMENT ([id number])    CPT Billing Codes:   TREATMENT OF SPEECH, LANGUAGE, VOICE, COMMUNICATION, and/or AUDITORY PROCESSING DISORDER (84707)    Thank you for allowing me to participate in this patient's care,    Shadi Martin M.M., Ph.D., CF-SLP  Clinical Fellow in Voice and Upper Airway Speech-Language Pathology  [facility]  [email or web address]  Pronouns: he/him    OBJECTIVE FINDINGS  PATIENT REPORTED MEASURES  Patient Supplied Answers To Last 2 VHI Questionnaires       No data to display              Patient Supplied Answers To Last 2 CSI Questionnaires      12/23/2024    10:47 AM   Cough Severity Index (CSI)   My cough is worse when I lie down 3    My coughing problem causes me to restrict my personal and social life 0    I tend to avoid places because of my cough problem 0    I feel embarrassed because of my coughing problem 2    People ask, ''What's wrong?'' because I cough a lot 0    I run out of air when I cough 0    My coughing problem affects my voice 2    My coughing problem limits my physical activity 0    My coughing problem upsets me 2    People ask me if I am sick because I cough a lot 0    CSI Score 9        Proxy-reported          Patient Supplied Answers To Last 2 EAT Questionnaires       No data to display                  Patient Supplied Answers to Dyspnea Index Questionnaire:       No data to display

## 2024-12-23 NOTE — LETTER
12/23/2024       RE: Cortney Nagy  [address] [place] [address]     Dear Colleague,    Thank you for referring your patient, Cortney Nagy, to the [facility]ICE [facility] [place] at [facility]. Please see a copy of my visit note below.    Cortney Nagy is a 74 year old female who is being evaluated via a billable video visit.      Cortney has been notified and verbally consented to the following:   This video visit will be conducted between you and your provider.  Patient has opted to conduct today's video visit vs an in-person appointment.   Video visits are billed at different rates depending on your insurance coverage. Please reach out to your insurance provider with any questions.   If during the course of the call the provider feels the appointment is not appropriate, you will not be charged for this service.  Provider has received verbal consent for billable virtual visit from the patient? Yes  Will anyone else be joining your video visit? No    Call initiated at: 1058   Type of Visit Platform Used: BioDigital  Location of provider: Work  Location of patient: [facility] VOICE CLINIC  PROGRESS REPORT (CPT 04605)    Patient: Cortney Nagy  Date of Service: 12/23/2024  Date of Last Service: 12/11/2024  Number of Visits: 3 visits / 2 therapy  Certification Dates: [employer] [facility] Medicare Advantage  Certification Begins:11/21/24  Certification Ends: 2/19/25   Referring Physician: Dr. 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 Perceptually, she demonstrated no dysphonia. Laryngoscopy today demonstrated mild vocal fold bowing bilaterally, with moderate-severe supraglottic  hyperfunction. Therefore, voice therapy has been recommended. Voice therapy sessions should begin by focusing on cough replacement strategies before addressing the patient's voice concerns related to prebylaryngis. Dr. Portillo has also ordered a GI referral for esophageal etiology work up to chronic cough at night and a sleep evaluation for snoring.     SUBJECTIVE:  Since Cortney's last session, she reports the following:   Patient reports that her cough/throat clear symptoms have been slightly improved in the interval since her last appointment  Cough/throat clear replacement strategies have been helpful per patient report.  She reports feeling dry in the morning.  She has been using Reflux Gourmet, but does not know if it is helping.    OBJECTIVE:  Ms. Nagy presents today with the following:  Voice Quality:  Speaking voice:  Roughness: WNL  Breathiness: WNL  Strain: WNL  Pitch: WNL  Loudness: WNL    Patient Specific Goal Metrics:      12/11/2024    10:00 AM 12/23/2024    11:00 AM   Dysponia SLP Goals   How severe is your cough /throat clearing, if 0 is no cough at all and 10 is the worst cough? 3 2   How much does your cough/throat-clearing problem bother you?            A little bit A little bit       THERAPEUTIC ACTIVITIES  Today Cortney participated in the following therapeutic activities:  Counseling and Education:  Asked many questions about the nature of her symptoms, and I answered all of these thoroughly.  Provided education related to chronic cough.  Provided education related to reflux.  Provided education related to humidification and systemic hydration.    I provided Cortney with explanation and skilled instruction of:    The patient was instructed on the use of semi-occluded vocal tract exercises to decrease vocal strain, increase vocal tract dilation, and improve respiratory-phonatory coordination.  The patient used straw phonation with water resistance with unstructured sustained phonation,  ascending pitch glides, descending pitch glides, and ascending and descending pitch glides with minimal verbal and modeling cues.  Trials to use this task as a preparatory gesture at the word-level were not facilitating.  All versions of this task were completed 3 times.  The patient reported that she didn't feel any urge to cough or throat clear after completing this exercise.  The patient used straw phonation with water resistance in the context of skilled voice use with minimal verbal cues.  The patient demonstrated moderately increased vocal clarity and decreased vocal effort in speaking voice following use of this exercise.    Instruction of Home Practice:  A revised home exercise plan (HEP) was collaboratively designed and instructed.  The patient confirmed that the HEP was reasonable and that it seemed attainable for daily practice.  I provided an AVS of important notes of today's therapeutic activities to facilitate practice.    ASSESSMENT/PLAN  Progress toward long-term goals:  Adequate but incomplete progress; please see above    Impressions:  IMPRESSIONS:    Ms. Nagy is a 74 y.o. 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 Cortney demonstrates reduced cough and throat clear symptoms, and notes that she sometimes experiences increased hoarseness when speaking on the phone with her sister.  As such, today's session focused on using SOVTE's to both reduce cough/throat clear symptoms and to improve respiratory-phonatory coordination. The patient endorsed benefit of this therapeutic exercise and stated that she experienced fairly immediate improvement in voice quality.  She will continue to work on her exercises on a daily basis, and work on incorporating the techniques into her daily activities. Speech therapy continues to be medically necessary for Cortney to participate fully and engage  in activities of daily living.     Plan:   I will see Cortney in 4 weeks (1/22/25) and will plan to assess the efficacy of her cough replacement strategies and adjust as needed. We have discussed the possibility of discharge from voice therapy if the improvements experienced today consistently generalize across activities of daily living.    For home practice goals, see AVS.     TOTAL SERVICE TIME:   Call Initiated at: 1058  Call Ended at: 1150  TREATMENT (83403)    CPT Billing Codes:   TREATMENT OF SPEECH, LANGUAGE, VOICE, COMMUNICATION, and/or AUDITORY PROCESSING DISORDER (20333)    Thank you for allowing me to participate in this patient's care,    Shadi Martin M.M., Ph.D., CF-SLP  Clinical Fellow in Voice and Upper Airway Speech-Language Pathology  [facility]  [email or web address]  Pronouns: he/him    OBJECTIVE FINDINGS  PATIENT REPORTED MEASURES  Patient Supplied Answers To Last 2 VHI Questionnaires       No data to display              Patient Supplied Answers To Last 2 CSI Questionnaires      12/23/2024    10:47 AM   Cough Severity Index (CSI)   My cough is worse when I lie down 3    My coughing problem causes me to restrict my personal and social life 0    I tend to avoid places because of my cough problem 0    I feel embarrassed because of my coughing problem 2    People ask, ''What's wrong?'' because I cough a lot 0    I run out of air when I cough 0    My coughing problem affects my voice 2    My coughing problem limits my physical activity 0    My coughing problem upsets me 2    People ask me if I am sick because I cough a lot 0    CSI Score 9        Proxy-reported          Patient Supplied Answers To Last 2 EAT Questionnaires       No data to display                  Patient Supplied Answers to Dyspnea Index Questionnaire:       No data to display                   Attestation signed by Vickie Welsh, SLP at 12/26/2024  1:01 PM:  I attest that the  documentation and services performed were provided by Shadi Martin MM PhD CF-SLP, were reviewed and completed under my supervision.     Vickie Welsh MS CCC-SLP  Speech-Language Pathologist  [facility]  Department of Otolaryngology - Head and Neck Surgery  [facility]  [email or web address]  Direct: [phone]  [phone]      Again, thank you for allowing me to participate in the care of your patient.      Sincerely,    Shadi Martin SLP

## 2025-01-17 ENCOUNTER — TELEPHONE (OUTPATIENT)
Dept: OTOLARYNGOLOGY | Facility: CLINIC | Age: 75
End: 2025-01-17

## 2025-01-17 NOTE — TELEPHONE ENCOUNTER
"Registration notes for pt's upcoming appt on 1/22/25 with SHERI Perry indicates \"The enrollment table for this provider is not active\".     Please advise if this is something that can be fixed, or if appt needs to be rescheduled?  "

## 2025-03-04 ENCOUNTER — VIRTUAL VISIT (OUTPATIENT)
Dept: GASTROENTEROLOGY | Facility: CLINIC | Age: 75
End: 2025-03-04
Attending: OTOLARYNGOLOGY
Payer: COMMERCIAL

## 2025-03-04 ENCOUNTER — PRE VISIT (OUTPATIENT)
Dept: GASTROENTEROLOGY | Facility: CLINIC | Age: 75
End: 2025-03-04

## 2025-03-04 VITALS — BODY MASS INDEX: 19.29 KG/M2 | WEIGHT: 120 LBS | HEIGHT: 66 IN

## 2025-03-04 DIAGNOSIS — R05.3 CHRONIC COUGH: ICD-10-CM

## 2025-03-04 PROCEDURE — 98001 SYNCH AUDIO-VIDEO NEW LOW 30: CPT | Performed by: PHYSICIAN ASSISTANT

## 2025-03-04 PROCEDURE — 1126F AMNT PAIN NOTED NONE PRSNT: CPT | Mod: 95 | Performed by: PHYSICIAN ASSISTANT

## 2025-03-04 ASSESSMENT — PAIN SCALES - GENERAL: PAINLEVEL_OUTOF10: NO PAIN (0)

## 2025-03-04 NOTE — NURSING NOTE
Current patient location: [address] [place] [address]    Is the patient currently in the state of [place]? YES    Visit mode: VIDEO    If the visit is dropped, the patient can be reconnected by:VIDEO VISIT: Text to cell phone:   Telephone Information:   Mobile [phone]       Will anyone else be joining the visit? NO  (If patient encounters technical issues they should call [phone])    Are changes needed to the allergy or medication list? No    Are refills needed on medications prescribed by this physician? NO    Rooming Documentation:  Questionnaire(s) completed    Reason for visit: Consult (GI problem)    Karen MUÑOZ

## 2025-03-04 NOTE — PROGRESS NOTES
Virtual Visit Details    Type of service:  Video Visit   Video Start Time: 10:20 AM  Video End Time:10:48 AM    Originating Location (pt. Location): Home    Distant Location (provider location):  Off-site  Platform used for Video Visit: [employer]    Gastroenterology Visit for: Cortney Nagy 1950   MRN: [id number]     Reason for Visit:  chief complaint    Referred by: Band[place]  / [facility] [id number]  Patient Care Team:  Oneyda Mann PA-C as PCP - General (Physician Assistant)  Betina Sandy APRN CNP as Assigned Pulmonology Provider  Elza Cast MD as MD (Radiation Oncology)  Trina Lam MD as Physician  Jesus Hines MD as MD (Hematology)  Abel Luis MD as MD (Hematology & Oncology)  Barbara Zhou RN as Specialty Care Coordinator (Hematology & Oncology)  Toyin Portillo MD as MD (Otolaryngology)  Elza Cast MD as Assigned Cancer Care Provider  Luh Barclay [facility] as Pharmacist (Pharmacist)  Luh Barclay [facility] as Assigned MT Pharmacist  Laly Meza, PhD LP as Assigned Behavioral Health Provider  Toyin Portillo MD as Assigned Surgical Provider    History of Present Illness:   Cortney Nagy is a 74 year old female with significant past medical history pertinent for anxiety, HLD and moderately differentiated invasive ductal breast cancer status postlumpectomy and radiation who is presenting as a new patient in consultation at the request of Dr. Portillo with a chief complaint of chronic cough.    -----------------------------------------------------------------------------------------------------------------------------------------------------------------------------------------------------------------------------------  [facility] March 4, 2025:    Chronic Cough - Described as a dry cough that she is able to reproduce during our visit today. Symptoms are intermittent however are the most bothersome at night as this wakes her from sleep.  "Although occur sporadically during the day. Occurring 1-2 days out of the week. No relation to oral intake. No relation to temperature extremes. No related to physical activity or house hold chores. When she coughs she is able to bring air in and out during these episodes.      Associated with throat clearing which is occurring daily.     The frequency of symptoms did improve with the work through [facility] in regards to both the cough and the throat clearing.     Reflux - Was taking reflux grommet nightly with an improvement in symptoms. Reflux symptoms will occur after consumption of a glutenous meal. Otherwise denies classic symptoms of reflux disease. No improvement with the use of Omeprazole 20 mg for 2-4 weeks that was prescribed by Dr. Portillo.      Limited coffee, tomato sauce and chocolate without an improvement in symptoms.     Denies weight loss, nausea, emesis, dysphagia, odynophagia, substernal chest pain, heartburn, regurgitation, dysgeusia, waterbrash symptoms, and abdominal pain.     Lost  2 years and 3 months ago. As well as her cat and dog. No impact on the cough or throat clearing.     Seldom use of ETOH products once every 2-3 weeks.     Denies use of tobacco and cannabinoid products.     Possible? Brother with pancreatic cancer.     Maternal cousin with pancreatic cancer.     No additional family history or GI related malignancy (esophageal, gastric, pancreatic, liver or colon).    No allergy to Nickel    When discussing the EGD BRAVO she stats \"this sounds like pure hell.\" Stats \"I am at the point that, I have a cough.\"       Esophageal Questionnaire(s)    BEDQ Questionnaire      3/4/2025     9:55 AM   BEDQ Questionnaire: How Often Have You Had the Following?   Trouble eating solid food (meat, bread, vegetables) 0    Trouble eating soft foods (yogurt, jello, pudding) 0    Trouble swallowing liquids 0    Pain while swallowing 0    Coughing or choking while swallowing foods or liquids 0    Total " Score: 0        Proxy-reported         3/4/2025     9:55 AM   BEDQ Questionnaire: Discomfort/Pain Ratings   Eating solid food (meat, bread, vegetables) 0    Eating soft foods (yogurt, jello, pudding) 0    Drinking liquid 0    Total Score: 0        Proxy-reported       Eckardt Questionnaire      3/4/2025     9:55 AM   Eckardt Questionnaire   Dysphagia 0    Regurgitation 0    Retrosternal Pain 0    Weight Loss (kg) 1    Total Score:  1        Proxy-reported       Promis 10 Questionnaire      3/4/2025     9:59 AM   PROMIS 10 FLOWSHEET DATA   In general, would you say your health is: 5    In general, would you say your quality of life is: 4    In general, how would you rate your physical health? 5    In general, how would you rate your mental health, including your mood and your ability to think? 3    In general, how would you rate your satisfaction with your social activities and relationships? 4    In general, please rate how well you carry out your usual social activities and roles. (This includes activities at home, at work and in your community, and responsibilities as a parent, child, spouse, employee, friend, etc.) 4    To what extent are you able to carry out your everyday physical activities such as walking, climbing stairs, carrying groceries, or moving a chair? 5    In the past 7 days, how often have you been bothered by emotional problems such as feeling anxious, depressed, or irritable? 4    In the past 7 days, how would you rate your fatigue on average? 2    In the past 7 days, how would you rate your pain on average, where 0 means no pain, and 10 means worst imaginable pain? 3    Mental health question re-calculation - no clinical value 2    Physical health question re-calculation - no clinical value 4    Pain question re-calculation - no clinical value 4    Global Mental Health Score 13    Global Physical Health Score 18    PROMIS TOTAL - SUBSCORES 31        Proxy-reported           STUDIES &  PROCEDURES:    EGD:       Colonoscopy:     EndoFLIP directed at the UES or LES (8cm (EF-325) balloon length or 16cm (EF-322) balloon length):   Date:  8cm balloon  Balloon inflation Balloon pressure CSA (mm^2) DI (mm^2/mmHg) Dmin (mm) Compliance   20 (ladmark ID)        30        40        50           16cm balloon  Balloon inflation Balloon pressure CSA (mm^2) DI (mm^2/mmHg) Dmin (mm) Compliance   30 (ladmark ID)        40        50        60        70           High Resolution Manometry:    PH/Impedance:     Bravo:    CT:    Esophagram:    FL VSS:     GES:    U/S:     XRAY:    Other:       Prior medical records were reviewed including, but not limited to, notes from referring providers, lab work, radiographic tests, and other diagnostic tests. Pertinent results were summarized above.     History     Past Medical History:   Diagnosis Date    Anemia     Anxiety     Breast cancer (H) 02/2024    Cerebral aneurysm, nonruptured     Depression     Fainting     Hyperlipemia     Mixed hyperlipidemia     Painful swelling of joint     PONV (postoperative nausea and vomiting)     Raynaud's disease without gangrene     Transient global amnesia        Past Surgical History:   Procedure Laterality Date    BREAST SURGERY  04/18/2024    Re-excision    COLONOSCOPY      DILATION AND CURETTAGE, OPERATIVE HYSTEROSCOPY, COMBINED N/A 02/29/2024    Procedure: DIAGNOSTIC HYSTEROSCOPY WITH BIOPSY OF ENDOMETRIUM;  Surgeon: Demi Daily MD;  Location: [facility] OR    EYE SURGERY      RETINA SURGERY,  left eye twice and right eye once    MASTECTOMY PARTIAL WITH SENTINEL NODE Left 04/05/2024    Procedure: wide local excision of left breast mass cancer, left lymphatic mapping, left sentinel lymph node biopsy transfer of tissue/advancement flap creation for closure of defect;  Surgeon: Trina Lam MD;  Location:  OR       Social History     Socioeconomic History    Marital status:      Spouse name: Not on file     Number of children: 3    Years of education: Not on file    Highest education level: Not on file   Occupational History    Not on file   Tobacco Use    Smoking status: Never    Smokeless tobacco: Never   Vaping Use    Vaping status: Never Used   Substance and Sexual Activity    Alcohol use: Not Currently     Comment: Occ    Drug use: Never    Sexual activity: Not on file   Other Topics Concern    Not on file   Social History Narrative    Not on file     Social Drivers of Health     Financial Resource Strain: Not on file   Food Insecurity: Not on file   Transportation Needs: Not on file   Physical Activity: Not on file   Stress: Not on file   Social Connections: Not on file   Interpersonal Safety: Low Risk  (11/21/2024)    Interpersonal Safety     Do you feel physically and emotionally safe where you currently live?: Yes     Within the past 12 months, have you been hit, slapped, kicked or otherwise physically hurt by someone?: No     Within the past 12 months, have you been humiliated or emotionally abused in other ways by your partner or ex-partner?: No   Housing Stability: Not on file       Family History   Problem Relation Age of Onset    Heart Disease Mother     Prostate Cancer Father     Diabetes Father     Heart Disease Father     Prostate Cancer Brother     Prostate Cancer Brother      Family history reviewed and edited as appropriate    Medications and Allergies:     Outpatient Encounter Medications as of 3/4/2025   Medication Sig Dispense Refill    amphetamine-dextroamphetamine (ADDERALL XR) 5 MG 24 hr capsule Take 5 mg by mouth every morning.      escitalopram (LEXAPRO) 10 MG tablet Take 10 mg by mouth every morning.      letrozole (FEMARA) 2.5 MG tablet Take 1 tablet (2.5 mg) by mouth daily. 30 tablet 11    letrozole (FEMARA) 2.5 MG tablet Take 1 tablet (2.5 mg) by mouth daily (Patient not taking: Reported on 10/14/2024) 30 tablet 5    omeprazole (PRILOSEC) 20 MG DR capsule Take 1 capsule (20 mg) by mouth  "daily. (Patient not taking: Reported on 1/17/2025) 30 capsule 4    pravastatin (PRAVACHOL) 10 MG tablet Take 10 mg by mouth At Bedtime       UNABLE TO FIND Take by mouth every evening. MEDICATION NAME: Reflex gourmet all natural:Algenate complex    Dextrose sugar white grape fruit seed extract polylysine jorge a aloe      UNABLE TO FIND MEDICATION NAME: Calcium magnesium zinc 333-133-5mg       No facility-administered encounter medications on file as of 3/4/2025.        Allergies   Allergen Reactions    Clindamycin Swelling     Throat tightness    Aleve [Naproxen] Dizziness    Codeine Other (See Comments)     Hallucinations.    Penicillins Hives    Propoxyphene N-Acetaminophen [Propoxyphene N-Apap] Unknown    Sulfa (Sulfonamide Antibiotics) [Sulfa Antibiotics] Dizziness        Review of systems:  A full 10 point review of systems was obtained and was negative except for the pertinent positives and negatives stated within the HPI.    Objective Findings:   Physical Exam:    Constitutional: Ht 1.676 m (5' 6\")   Wt 54.4 kg (120 lb)   BMI 19.37 kg/m    General: Alert, cooperative, no distress, well-appearing  Head: Atraumatic, normocephalic, no obvious abnormalities   Eyes: Sclera anicteric, no obvious conjunctival hemorrhage   Nose: Nares normal, no obvious malformation, no obvious rhinorrhea   Respiratory: Resting comfortably, no apparent distress, no cough.   Skin: No jaundice, no obvious rash  Neurologic: AAOx3, no obvious neurologic abnormality  Psychiatric: Normal Affect, appropriate mood  Extremities: No obvious edema, no obvious malformation     Labs, Radiology, Pathology     Lab Results   Component Value Date    WBC 6.9 04/05/2024    WBC 5.3 11/07/2019    HGB 11.5 (L) 04/05/2024    HGB 12.0 08/23/2023    HGB 12.2 11/07/2019     04/05/2024     11/09/2019     11/07/2019    CHOL 213 (H) 11/02/2023    CHOL 190 11/02/2022    CHOL 201 (H) 10/13/2021    TRIG 62 11/02/2023    TRIG 59 11/02/2022    " TRIG 54 10/13/2021    HDL 84 11/02/2023    HDL 68 11/02/2022    HDL 82 10/13/2021    ALT 16 09/09/2024    ALT 13 11/02/2022    ALT 13 11/07/2019    AST 25 09/09/2024    AST 19 11/02/2022    AST 18 11/07/2019     09/09/2024     04/03/2024     02/27/2024    BUN 19.0 09/09/2024    BUN 13.5 04/03/2024    BUN 15.4 02/27/2024    CO2 28 09/09/2024    CO2 25 04/03/2024    CO2 26 02/27/2024    TSH 0.97 11/02/2022    TSH 0.98 10/13/2021    TSH 0.87 09/13/2018    INR 1.01 11/07/2019        Liver Function Studies -   Recent Labs   Lab Test 09/09/24  1053   PROTTOTAL 7.0   ALBUMIN 4.5   BILITOTAL 0.3   ALKPHOS 54   AST 25   ALT 16          Patient Active Problem List    Diagnosis Date Noted    HLD (hyperlipidemia) 04/04/2024     Priority: Medium     Formatting of this note might be different from the original.   Created by Conversion      Diverticular disease of large intestine 09/01/2021     Priority: Medium    Anxiety 11/07/2019     Priority: Medium    Transient global amnesia 11/07/2019     Priority: Medium    Benign neoplasm of ascending colon 08/20/2018     Priority: Medium    Constipation 08/16/2018     Priority: Medium    History of colonic polyps 08/16/2018     Priority: Medium      Assessment and Plan   Assessment/Plan:    Cortney Nagy is a 74 year old female with significant past medical history pertinent for anxiety, HLD and moderately differentiated invasive ductal breast cancer status postlumpectomy and radiation who is presenting as a new patient in consultation at the request of Dr. Portillo with a chief complaint of chronic cough.    #Chronic Cough   Patient was seen by Dr. Portillo 11/21/2024 with ENT for symptoms of chronic cough/throat clearing onset 10 years prior.  Overall symptoms were described to be stable.  Additional pertinent past medical history for breast cancer status post radiation in 2024.  Flexible laryngoscopy was notable for presbylarynx and FEES was without aspiration or  penetration although there was throat clearing/coughing despite the lack of residue.  Dietary commendations were for regular diet with thin liquids.  Symptoms were thought to be secondary to LPR and possible sleep apnea.  Patient was recommended to continue omeprazole 20 mg, begin reflux Gourmet at bedtimes and after meals, undergo cough/throat clearing suppressive therapy as well as evaluation for MARIELENA. Lastly, consultation was placed to GI for additional evaluation.    12/11/2024 and 12/23/2024 follow-up with SLP Shadi Martin for chronic cough, dysphonia in the setting of irritable larynx syndrome/presbylarynx.  Patient was recommended to follow-up in 4 weeks time which is yet to be completed.    With the presence of extra esophageal manifestations of reflux disease as patient's presenting concern objective pH monitoring was recommended.  Currently, this does not align with patient's goals of care and she defers any additional evaluation at this time.  Interestingly, she does report an improvement with her work through the SLP team and denies symptoms of heartburn/regurgitation or substernal chest discomfort unless she has consumed a glutenous meal.  She had no improvement with the use of omeprazole 20 mg for which she took once daily 2 to 4 weeks.    - Consider upper endoscopic evaluation with 96 hour BRAVO study off acid acid suppressive therapy   - Recommend obtaining sleep evaluation as previously recommenced   - Reflux lifestyle modifications as directed within the AVS     #Family History of Pancreatic Cancer     - Consultation to pancreatobiliary team       Follow up plan:   Return to clinic as needed.    The risks and benefits of my recommendations, as well as other treatment options were discussed with the patient and any available family today. All questions were answered.     Follow up: As planned above. Today, I personally spent 28 minutes in direct face to face time with the patient. Approximately 10  minutes were spent on indirect care associated with the patient's consultation including but not limited to review of: patient medical records to date, clinic visits, hospital records, lab results, imaging studies, procedural documentation, coordinating care with other providers and further activities per the note. The findings from this review are summarized in the above note. All of the above accounted for a cumulative time of 38 minutes and was performed on the date of service.     The patient verbalized understanding of the plan and was appreciative for the time spent and information provided during the office visit.           Elva Emery PA-C  Division of Gastroenterology, Hepatology, and Nutrition  [facility]       Documentation assisted by voice recognition and documentation system.

## 2025-03-04 NOTE — PATIENT INSTRUCTIONS
It was a pleasure meeting with you today and discussing your healthcare plan. Below is a summary of what we covered:    - Consider upper endoscopic evaluation with 96 hour BRAVO study off acid acid suppressive therapy, to prove the presence of reflux disease and does the reflux correlate with the times of cough and throat clearing   - Recommend obtaining sleep evaluation as previously recommenced   - Reflux lifestyle modifications as directed within the AVS     Gastroesophageal Reflux Disease (GERD) Lifestyle Modifications:   If taking acid suppression therapy (PPI ie Pantoprazole, Lansoprazole, Omeprazole, Esomeprazole, Rabeprazole, Dexlansoprazole) it should be taken 30 - 60 minutes prior to meals on an empty stomach to have maximum effect  Avoid triggers for reflux such as coffee, chocolate, carbonated beverages, spicy foods, acidic foods (tomato based/citrus and foods with high fat content   Abstinence from alcohol and cessation of all tobacco products is recommended   Studies have shown that weight loss, exercise and maintaining a healthy BMI significantly reduce GERD symptoms   Remain upright while eating and immediately after meals  Do not eat or drink at least 3 hours prior to laying down supine/laying down for bed   Avoid late night/middle of the night snacking    Consider obtaining a wedge pillow or elevating the head end of the bed while sleeping   Avoid sleeping right side down as this can place the lower part of the esophagus/lower esophageal sphincter in a dependent position that favors reflux   Attempting to eat smaller more frequent meals may improve symptoms     Please call my nurse Freda ([phone]) or Char ([phone]) with any questions or concerns.        See below for any additional questions and scheduling guidelines.    Sign up for [employer]: [employer] patient portal serves as a secure platform for accessing your medical records from the [facility]. Additionally, [employer]  facilitates easy, timely, and secure messaging with your care team. If you have not signed up, you may do so by using the provided code or calling [phone].    Coordinating your care after your visit:  There are multiple options for scheduling your follow-up care based on your provider's recommendation.    How do I schedule a follow-up clinic appointment:   After your appointment, you may receive scheduling assistance with the Clinic Coordinators by having a seat in the waiting room and a Clinic Coordinator will call you up to schedule.  Virtual visits or after you leave the clinic:  Your provider has placed a follow-up order in the [employer] portal for scheduling your return appointment. A member of the scheduling team will contact you to schedule.  [facility] Scheduling: Timely scheduling through [employer] is advised to ensure appointment availability.   Call to schedule: You may schedule your follow-up appointment(s) by calling [phone], option 1.    How do I schedule my endoscopy or colonoscopy procedure:  If a procedure, such as a colonoscopy or upper endoscopy was ordered by your provider, the scheduling team will contact you to schedule this procedure. Or you may choose to call to schedule at   [phone], option 2.  Please allow 20-30 minutes when scheduling a procedure.    How do I get my blood work done? To get your blood work done, you need to schedule a lab appointment at an [facility]. There are multiple ways to schedule:   At the clinic: The Clinic Coordinator you meet after your visit can help you schedule a lab appointment.   [facility] scheduling: [facility] offers online lab scheduling at all [facility] laboratory locations.   Call to schedule: You can call [phone] to schedule your lab appointment.    How do I schedule my imaging study: To schedule imaging studies, such as CT scans, ultrasounds, MRIs, or X-rays, contact Imaging Services at [phone].    How do I  schedule a referral to another doctor: If your provider recommended a referral to another specialist(s), the referral order was placed by your provider. You will receive a phone call to schedule this referral, or you may choose to call the number attached to the referral to self-schedule.    For Post-Visit Question(s):  For any inquiries following today's visit:  Please utilize [employer] messaging and allow 48 hours for reply or contact the Call Center during normal business hours at [phone], option 3.  For Emergent After-hours questions, contact the On-Call GI Fellow through the [facility]  at [phone].  In addition, you may contact your Nurse directly using the provided contact information.    Test Results:  Test results will be accessible via [employer] in compliance with the 21st Century Cures Act. This means that your results will be available to you at the same time as your provider. Often you may see your results before your provider does. Results are reviewed by staff within two weeks with communication follow-up. Results may be released in the patient portal prior to your care team review.    Prescription Refill(s):  Medication prescribed by your provider will be addressed during your visit. For future refills, please coordinate with your pharmacy. If you have not had a recent clinic visit or routine labs, for your safety, your provider may not be able to refill your prescription.     Clinic Fax Number: [phone]        Sincerely,    Elva Emery PA-C  Division of Gastroenterology, Hepatology, and Nutrition  [facility]

## 2025-03-04 NOTE — LETTER
3/4/2025      Cortney Nagy  [address]  [place] [address]      Dear Colleague,    Thank you for referring your patient, Cortney Nagy, to the [facility] [place]. Please see a copy of my visit note below.    Virtual Visit Details    Type of service:  Video Visit   Video Start Time: 10:20 AM  Video End Time:10:48 AM    Originating Location (pt. Location): Home    Distant Location (provider location):  Off-site  Platform used for Video Visit: [facility]    Gastroenterology Visit for: Cortney Nagy 1950   MRN: [id number]     Reason for Visit:  chief complaint    Referred by: [facility]  / [facility] [id number]  Patient Care Team:  Oneyda Mann PA-C as PCP - General (Physician Assistant)  Betina Sandy APRN CNP as Assigned Pulmonology Provider  Elza Cast MD as MD (Radiation Oncology)  Trina Lam MD as Physician  Jesus Hines MD as MD (Hematology)  Abel Luis MD as MD (Hematology & Oncology)  Barbara Zhou, RN as Specialty Care Coordinator (Hematology & Oncology)  Toyin Portillo MD as MD (Otolaryngology)  Elza Cast MD as Assigned Cancer Care Provider  Luh Barclay [facility] as Pharmacist (Pharmacist)  Luh Barclay [facility] as Assigned MTM Pharmacist  Laly Meza, PhD LP as Assigned Behavioral Health Provider  Toyin Portillo MD as Assigned Surgical Provider    History of Present Illness:   Cortney Nagy is a 74 year old female with significant past medical history pertinent for anxiety, HLD and moderately differentiated invasive ductal breast cancer status postlumpectomy and radiation who is presenting as a new patient in consultation at the request of Dr. Portillo with a chief complaint of chronic  "cough.    -----------------------------------------------------------------------------------------------------------------------------------------------------------------------------------------------------------------------------------  HPI March 4, 2025:    Chronic Cough - Described as a dry cough that she is able to reproduce during our visit today. Symptoms are intermittent however are the most bothersome at night as this wakes her from sleep. Although occur sporadically during the day. Occurring 1-2 days out of the week. No relation to oral intake. No relation to temperature extremes. No related to physical activity or house hold chores. When she coughs she is able to bring air in and out during these episodes.      Associated with throat clearing which is occurring daily.     The frequency of symptoms did improve with the work through SLP in regards to both the cough and the throat clearing.     Reflux - Was taking reflux grommet nightly with an improvement in symptoms. Reflux symptoms will occur after consumption of a glutenous meal. Otherwise denies classic symptoms of reflux disease. No improvement with the use of Omeprazole 20 mg for 2-4 weeks that was prescribed by Dr. Portillo.      Limited coffee, tomato sauce and chocolate without an improvement in symptoms.     Denies weight loss, nausea, emesis, dysphagia, odynophagia, substernal chest pain, heartburn, regurgitation, dysgeusia, waterbrash symptoms, and abdominal pain.     Lost  2 years and 3 months ago. As well as her cat and dog. No impact on the cough or throat clearing.     Seldom use of ETOH products once every 2-3 weeks.     Denies use of tobacco and cannabinoid products.     Possible? Brother with pancreatic cancer.     Maternal cousin with pancreatic cancer.     No additional family history or GI related malignancy (esophageal, gastric, pancreatic, liver or colon).    No allergy to Nickel    When discussing the EGD BRAVO she stats \"this " "sounds like pure hell.\" Stats \"I am at the point that, I have a cough.\"       Esophageal Questionnaire(s)    BEDQ Questionnaire      3/4/2025     9:55 AM   BEDQ Questionnaire: How Often Have You Had the Following?   Trouble eating solid food (meat, bread, vegetables) 0    Trouble eating soft foods (yogurt, jello, pudding) 0    Trouble swallowing liquids 0    Pain while swallowing 0    Coughing or choking while swallowing foods or liquids 0    Total Score: 0        Proxy-reported         3/4/2025     9:55 AM   BEDQ Questionnaire: Discomfort/Pain Ratings   Eating solid food (meat, bread, vegetables) 0    Eating soft foods (yogurt, jello, pudding) 0    Drinking liquid 0    Total Score: 0        Proxy-reported       Eckardt Questionnaire      3/4/2025     9:55 AM   Eckardt Questionnaire   Dysphagia 0    Regurgitation 0    Retrosternal Pain 0    Weight Loss (kg) 1    Total Score:  1        Proxy-reported       Promis 10 Questionnaire      3/4/2025     9:59 AM   PROMIS 10 FLOWSHEET DATA   In general, would you say your health is: 5    In general, would you say your quality of life is: 4    In general, how would you rate your physical health? 5    In general, how would you rate your mental health, including your mood and your ability to think? 3    In general, how would you rate your satisfaction with your social activities and relationships? 4    In general, please rate how well you carry out your usual social activities and roles. (This includes activities at home, at work and in your community, and responsibilities as a parent, child, spouse, employee, friend, etc.) 4    To what extent are you able to carry out your everyday physical activities such as walking, climbing stairs, carrying groceries, or moving a chair? 5    In the past 7 days, how often have you been bothered by emotional problems such as feeling anxious, depressed, or irritable? 4    In the past 7 days, how would you rate your fatigue on average? 2    In " the past 7 days, how would you rate your pain on average, where 0 means no pain, and 10 means worst imaginable pain? 3    Mental health question re-calculation - no clinical value 2    Physical health question re-calculation - no clinical value 4    Pain question re-calculation - no clinical value 4    Global Mental Health Score 13    Global Physical Health Score 18    PROMIS TOTAL - SUBSCORES 31        Proxy-reported           STUDIES & PROCEDURES:    EGD:       Colonoscopy:     EndoFLIP directed at the UES or LES (8cm (EF-325) balloon length or 16cm (EF-322) balloon length):   Date:  8cm balloon  Balloon inflation Balloon pressure CSA (mm^2) DI (mm^2/mmHg) Dmin (mm) Compliance   20 (ladmark ID)        30        40        50           16cm balloon  Balloon inflation Balloon pressure CSA (mm^2) DI (mm^2/mmHg) Dmin (mm) Compliance   30 (ladmark ID)        40        50        60        70           High Resolution Manometry:    PH/Impedance:     Bravo:    CT:    Esophagram:    FL VSS:     GES:    U/S:     XRAY:    Other:       Prior medical records were reviewed including, but not limited to, notes from referring providers, lab work, radiographic tests, and other diagnostic tests. Pertinent results were summarized above.     History     Past Medical History:   Diagnosis Date     Anemia      Anxiety      Breast cancer (H) 02/2024     Cerebral aneurysm, nonruptured      Depression      Fainting      Hyperlipemia      Mixed hyperlipidemia      Painful swelling of joint      PONV (postoperative nausea and vomiting)      Raynaud's disease without gangrene      Transient global amnesia        Past Surgical History:   Procedure Laterality Date     BREAST SURGERY  04/18/2024    Re-excision     COLONOSCOPY       DILATION AND CURETTAGE, OPERATIVE HYSTEROSCOPY, COMBINED N/A 02/29/2024    Procedure: DIAGNOSTIC HYSTEROSCOPY WITH BIOPSY OF ENDOMETRIUM;  Surgeon: Demi Daily MD;  Location: [facility] OR     EYE SURGERY       RETINA SURGERY,  left eye twice and right eye once     MASTECTOMY PARTIAL WITH SENTINEL NODE Left 04/05/2024    Procedure: wide local excision of left breast mass cancer, left lymphatic mapping, left sentinel lymph node biopsy transfer of tissue/advancement flap creation for closure of defect;  Surgeon: Trina Lam MD;  Location:  OR       Social History     Socioeconomic History     Marital status:      Spouse name: Not on file     Number of children: 3     Years of education: Not on file     Highest education level: Not on file   Occupational History     Not on file   Tobacco Use     Smoking status: Never     Smokeless tobacco: Never   Vaping Use     Vaping status: Never Used   Substance and Sexual Activity     Alcohol use: Not Currently     Comment: Occ     Drug use: Never     Sexual activity: Not on file   Other Topics Concern     Not on file   Social History Narrative     Not on file     Social Drivers of Health     Financial Resource Strain: Not on file   Food Insecurity: Not on file   Transportation Needs: Not on file   Physical Activity: Not on file   Stress: Not on file   Social Connections: Not on file   Interpersonal Safety: Low Risk  (11/21/2024)    Interpersonal Safety      Do you feel physically and emotionally safe where you currently live?: Yes      Within the past 12 months, have you been hit, slapped, kicked or otherwise physically hurt by someone?: No      Within the past 12 months, have you been humiliated or emotionally abused in other ways by your partner or ex-partner?: No   Housing Stability: Not on file       Family History   Problem Relation Age of Onset     Heart Disease Mother      Prostate Cancer Father      Diabetes Father      Heart Disease Father      Prostate Cancer Brother      Prostate Cancer Brother      Family history reviewed and edited as appropriate    Medications and Allergies:     Outpatient Encounter Medications as of 3/4/2025   Medication Sig Dispense  "Refill     amphetamine-dextroamphetamine (ADDERALL XR) 5 MG 24 hr capsule Take 5 mg by mouth every morning.       escitalopram (LEXAPRO) 10 MG tablet Take 10 mg by mouth every morning.       letrozole (FEMARA) 2.5 MG tablet Take 1 tablet (2.5 mg) by mouth daily. 30 tablet 11     letrozole (FEMARA) 2.5 MG tablet Take 1 tablet (2.5 mg) by mouth daily (Patient not taking: Reported on 10/14/2024) 30 tablet 5     omeprazole (PRILOSEC) 20 MG DR capsule Take 1 capsule (20 mg) by mouth daily. (Patient not taking: Reported on 1/17/2025) 30 capsule 4     pravastatin (PRAVACHOL) 10 MG tablet Take 10 mg by mouth At Bedtime        UNABLE TO FIND Take by mouth every evening. MEDICATION NAME: Reflex gourmet all natural:Algenate complex    Dextrose sugar white grape fruit seed extract polylysine jorge a aloe       UNABLE TO FIND MEDICATION NAME: Calcium magnesium zinc 333-133-5mg       No facility-administered encounter medications on file as of 3/4/2025.        Allergies   Allergen Reactions     Clindamycin Swelling     Throat tightness     Aleve [Naproxen] Dizziness     Codeine Other (See Comments)     Hallucinations.     Penicillins Hives     Propoxyphene N-Acetaminophen [Propoxyphene N-Apap] Unknown     Sulfa (Sulfonamide Antibiotics) [Sulfa Antibiotics] Dizziness        Review of systems:  A full 10 point review of systems was obtained and was negative except for the pertinent positives and negatives stated within the HPI.    Objective Findings:   Physical Exam:    Constitutional: Ht 1.676 m (5' 6\")   Wt 54.4 kg (120 lb)   BMI 19.37 kg/m    General: Alert, cooperative, no distress, well-appearing  Head: Atraumatic, normocephalic, no obvious abnormalities   Eyes: Sclera anicteric, no obvious conjunctival hemorrhage   Nose: Nares normal, no obvious malformation, no obvious rhinorrhea   Respiratory: Resting comfortably, no apparent distress, no cough.   Skin: No jaundice, no obvious rash  Neurologic: AAOx3, no obvious neurologic " abnormality  Psychiatric: Normal Affect, appropriate mood  Extremities: No obvious edema, no obvious malformation     Labs, Radiology, Pathology     Lab Results   Component Value Date    WBC 6.9 04/05/2024    WBC 5.3 11/07/2019    HGB 11.5 (L) 04/05/2024    HGB 12.0 08/23/2023    HGB 12.2 11/07/2019     04/05/2024     11/09/2019     11/07/2019    CHOL 213 (H) 11/02/2023    CHOL 190 11/02/2022    CHOL 201 (H) 10/13/2021    TRIG 62 11/02/2023    TRIG 59 11/02/2022    TRIG 54 10/13/2021    HDL 84 11/02/2023    HDL 68 11/02/2022    HDL 82 10/13/2021    ALT 16 09/09/2024    ALT 13 11/02/2022    ALT 13 11/07/2019    AST 25 09/09/2024    AST 19 11/02/2022    AST 18 11/07/2019     09/09/2024     04/03/2024     02/27/2024    BUN 19.0 09/09/2024    BUN 13.5 04/03/2024    BUN 15.4 02/27/2024    CO2 28 09/09/2024    CO2 25 04/03/2024    CO2 26 02/27/2024    TSH 0.97 11/02/2022    TSH 0.98 10/13/2021    TSH 0.87 09/13/2018    INR 1.01 11/07/2019        Liver Function Studies -   Recent Labs   Lab Test 09/09/24  1053   PROTTOTAL 7.0   ALBUMIN 4.5   BILITOTAL 0.3   ALKPHOS 54   AST 25   ALT 16          Patient Active Problem List    Diagnosis Date Noted     HLD (hyperlipidemia) 04/04/2024     Priority: Medium     Formatting of this note might be different from the original.   Created by Conversion       Diverticular disease of large intestine 09/01/2021     Priority: Medium     Anxiety 11/07/2019     Priority: Medium     Transient global amnesia 11/07/2019     Priority: Medium     Benign neoplasm of ascending colon 08/20/2018     Priority: Medium     Constipation 08/16/2018     Priority: Medium     History of colonic polyps 08/16/2018     Priority: Medium      Assessment and Plan   Assessment/Plan:    Cortney Nagy is a 74 year old female with significant past medical history pertinent for anxiety, HLD and moderately differentiated invasive ductal breast cancer status postlumpectomy and  radiation who is presenting as a new patient in consultation at the request of Dr. Portillo with a chief complaint of chronic cough.    #Chronic Cough   Patient was seen by Dr. Portillo 11/21/2024 with ENT for symptoms of chronic cough/throat clearing onset 10 years prior.  Overall symptoms were described to be stable.  Additional pertinent past medical history for breast cancer status post radiation in 2024.  Flexible laryngoscopy was notable for presbylarynx and FEES was without aspiration or penetration although there was throat clearing/coughing despite the lack of residue.  Dietary commendations were for regular diet with thin liquids.  Symptoms were thought to be secondary to LPR and possible sleep apnea.  Patient was recommended to continue omeprazole 20 mg, begin reflux Gourmet at bedtimes and after meals, undergo cough/throat clearing suppressive therapy as well as evaluation for MARIELENA. Lastly, consultation was placed to GI for additional evaluation.    12/11/2024 and 12/23/2024 follow-up with SLP Shadi Martin for chronic cough, dysphonia in the setting of irritable larynx syndrome/presbylarynx.  Patient was recommended to follow-up in 4 weeks time which is yet to be completed.    With the presence of extra esophageal manifestations of reflux disease as patient's presenting concern objective pH monitoring was recommended.  Currently, this does not align with patient's goals of care and she defers any additional evaluation at this time.  Interestingly, she does report an improvement with her work through the SLP team and denies symptoms of heartburn/regurgitation or substernal chest discomfort unless she has consumed a glutenous meal.  She had no improvement with the use of omeprazole 20 mg for which she took once daily 2 to 4 weeks.    - Consider upper endoscopic evaluation with 96 hour BRAVO study off acid acid suppressive therapy   - Recommend obtaining sleep evaluation as previously recommenced   - Reflux  lifestyle modifications as directed within the AVS     #Family History of Pancreatic Cancer     - Consultation to pancreatobiliary team       Follow up plan:   Return to clinic as needed.    The risks and benefits of my recommendations, as well as other treatment options were discussed with the patient and any available family today. All questions were answered.     Follow up: As planned above. Today, I personally spent 28 minutes in direct face to face time with the patient. Approximately 10 minutes were spent on indirect care associated with the patient's consultation including but not limited to review of: patient medical records to date, clinic visits, hospital records, lab results, imaging studies, procedural documentation, coordinating care with other providers and further activities per the note. The findings from this review are summarized in the above note. All of the above accounted for a cumulative time of 38 minutes and was performed on the date of service.     The patient verbalized understanding of the plan and was appreciative for the time spent and information provided during the office visit.           Elva Emery PA-C  Division of Gastroenterology, Hepatology, and Nutrition  [facility]       Documentation assisted by voice recognition and documentation system.            Again, thank you for allowing me to participate in the care of your patient.        Sincerely,        Elva Emery PA-C    Electronically signed

## 2025-03-18 ENCOUNTER — DOCUMENTATION ONLY (OUTPATIENT)
Dept: OTOLARYNGOLOGY | Facility: CLINIC | Age: 75
End: 2025-03-18
Payer: COMMERCIAL

## 2025-04-08 ENCOUNTER — LAB REQUISITION (OUTPATIENT)
Dept: LAB | Facility: CLINIC | Age: 75
End: 2025-04-08
Payer: COMMERCIAL

## 2025-04-08 DIAGNOSIS — Z87.39 PERSONAL HISTORY OF OTHER DISEASES OF THE MUSCULOSKELETAL SYSTEM AND CONNECTIVE TISSUE: ICD-10-CM

## 2025-04-08 DIAGNOSIS — E78.2 MIXED HYPERLIPIDEMIA: ICD-10-CM

## 2025-04-08 PROCEDURE — 83735 ASSAY OF MAGNESIUM: CPT | Mod: ORL | Performed by: FAMILY MEDICINE

## 2025-04-08 PROCEDURE — 84100 ASSAY OF PHOSPHORUS: CPT | Mod: ORL | Performed by: FAMILY MEDICINE

## 2025-04-08 PROCEDURE — 82306 VITAMIN D 25 HYDROXY: CPT | Mod: ORL | Performed by: FAMILY MEDICINE

## 2025-04-08 PROCEDURE — 80053 COMPREHEN METABOLIC PANEL: CPT | Mod: ORL | Performed by: FAMILY MEDICINE

## 2025-04-08 PROCEDURE — 80061 LIPID PANEL: CPT | Mod: ORL | Performed by: FAMILY MEDICINE

## 2025-04-08 PROCEDURE — 83970 ASSAY OF PARATHORMONE: CPT | Mod: ORL | Performed by: FAMILY MEDICINE

## 2025-04-09 LAB
ALBUMIN SERPL BCG-MCNC: 4.2 G/DL (ref 3.5–5.2)
ALP SERPL-CCNC: 45 U/L (ref 40–150)
ALT SERPL W P-5'-P-CCNC: 12 U/L (ref 0–50)
ANION GAP SERPL CALCULATED.3IONS-SCNC: 8 MMOL/L (ref 7–15)
AST SERPL W P-5'-P-CCNC: 19 U/L (ref 0–45)
BILIRUB SERPL-MCNC: 0.2 MG/DL
BUN SERPL-MCNC: 16.2 MG/DL (ref 8–23)
CALCIUM SERPL-MCNC: 9.5 MG/DL (ref 8.8–10.4)
CHLORIDE SERPL-SCNC: 104 MMOL/L (ref 98–107)
CHOLEST SERPL-MCNC: 233 MG/DL
CREAT SERPL-MCNC: 0.69 MG/DL (ref 0.51–0.95)
EGFRCR SERPLBLD CKD-EPI 2021: >90 ML/MIN/1.73M2
FASTING STATUS PATIENT QL REPORTED: ABNORMAL
FASTING STATUS PATIENT QL REPORTED: NORMAL
GLUCOSE SERPL-MCNC: 92 MG/DL (ref 70–99)
HCO3 SERPL-SCNC: 28 MMOL/L (ref 22–29)
HDLC SERPL-MCNC: 82 MG/DL
LDLC SERPL CALC-MCNC: 137 MG/DL
MAGNESIUM SERPL-MCNC: 2.3 MG/DL (ref 1.7–2.3)
NONHDLC SERPL-MCNC: 151 MG/DL
PHOSPHATE SERPL-MCNC: 2.8 MG/DL (ref 2.5–4.5)
POTASSIUM SERPL-SCNC: 4.7 MMOL/L (ref 3.4–5.3)
PROT SERPL-MCNC: 6.5 G/DL (ref 6.4–8.3)
PTH-INTACT SERPL-MCNC: 25 PG/ML (ref 15–65)
SODIUM SERPL-SCNC: 140 MMOL/L (ref 135–145)
TRIGL SERPL-MCNC: 70 MG/DL
VIT D+METAB SERPL-MCNC: 53 NG/ML (ref 20–50)

## 2025-05-29 ENCOUNTER — LAB REQUISITION (OUTPATIENT)
Dept: LAB | Facility: CLINIC | Age: 75
End: 2025-05-29
Payer: COMMERCIAL

## 2025-05-29 DIAGNOSIS — Z87.39 PERSONAL HISTORY OF OTHER DISEASES OF THE MUSCULOSKELETAL SYSTEM AND CONNECTIVE TISSUE: ICD-10-CM

## 2025-05-29 LAB
CALCIUM 24H UR-MRATE: 0.16 G/SPEC (ref 0.1–0.3)
CALCIUM UR-MCNC: 10.9 MG/DL
COLLECT DURATION TIME UR: 24 H
SPECIMEN VOL UR: 1500 ML

## 2025-05-29 PROCEDURE — 82340 ASSAY OF CALCIUM IN URINE: CPT | Mod: ORL | Performed by: FAMILY MEDICINE

## 2025-05-31 ENCOUNTER — HEALTH MAINTENANCE LETTER (OUTPATIENT)
Age: 75
End: 2025-05-31

## 2025-06-25 ENCOUNTER — TRANSFERRED RECORDS (OUTPATIENT)
Dept: HEALTH INFORMATION MANAGEMENT | Facility: CLINIC | Age: 75
End: 2025-06-25
Payer: COMMERCIAL

## 2025-06-30 ENCOUNTER — LAB REQUISITION (OUTPATIENT)
Dept: LAB | Facility: CLINIC | Age: 75
End: 2025-06-30
Payer: COMMERCIAL

## 2025-06-30 ENCOUNTER — PATIENT OUTREACH (OUTPATIENT)
Dept: ONCOLOGY | Facility: CLINIC | Age: 75
End: 2025-06-30
Payer: COMMERCIAL

## 2025-06-30 DIAGNOSIS — Z87.39 PERSONAL HISTORY OF OTHER DISEASES OF THE MUSCULOSKELETAL SYSTEM AND CONNECTIVE TISSUE: ICD-10-CM

## 2025-06-30 LAB — TSH SERPL DL<=0.005 MIU/L-ACNC: 1.42 UIU/ML (ref 0.3–4.2)

## 2025-06-30 PROCEDURE — 84443 ASSAY THYROID STIM HORMONE: CPT | Mod: ORL | Performed by: FAMILY MEDICINE

## 2025-06-30 NOTE — PROGRESS NOTES
[facility]: Cancer Care                                                                                        RN called patient to let her know that Dr. Luis is okay with her taking Alendronate for her osteoporosis, which was prescribed by her PCP. Patient stated she has been having insomnia, RN recommended she reach out to her PCP.       Barbara DE LA CRUZN, RN, OCN  Care Coordinator  [facility]    patient instructed to continue hydrocortisone as instructed patient instructed to continue hydrocortisone as instructed    Medical evaluation PST requested copy for adrenal insufficiency

## 2025-07-12 ENCOUNTER — HEALTH MAINTENANCE LETTER (OUTPATIENT)
Age: 75
End: 2025-07-12

## 2025-07-15 ENCOUNTER — LAB REQUISITION (OUTPATIENT)
Dept: LAB | Facility: CLINIC | Age: 75
End: 2025-07-15
Payer: COMMERCIAL

## 2025-07-15 DIAGNOSIS — T50.901A POISONING BY UNSPECIFIED DRUGS, MEDICAMENTS AND BIOLOGICAL SUBSTANCES, ACCIDENTAL (UNINTENTIONAL), INITIAL ENCOUNTER: ICD-10-CM

## 2025-07-15 LAB
ANION GAP SERPL CALCULATED.3IONS-SCNC: 12 MMOL/L (ref 7–15)
BUN SERPL-MCNC: 10.3 MG/DL (ref 8–23)
CALCIUM SERPL-MCNC: 9.2 MG/DL (ref 8.8–10.4)
CHLORIDE SERPL-SCNC: 106 MMOL/L (ref 98–107)
CREAT SERPL-MCNC: 0.72 MG/DL (ref 0.51–0.95)
EGFRCR SERPLBLD CKD-EPI 2021: 87 ML/MIN/1.73M2
GLUCOSE SERPL-MCNC: 85 MG/DL (ref 70–99)
HCO3 SERPL-SCNC: 26 MMOL/L (ref 22–29)
PHOSPHATE SERPL-MCNC: 3.5 MG/DL (ref 2.5–4.5)
POTASSIUM SERPL-SCNC: 4 MMOL/L (ref 3.4–5.3)
SODIUM SERPL-SCNC: 144 MMOL/L (ref 135–145)

## 2025-08-26 DIAGNOSIS — C50.012 MALIGNANT NEOPLASM INVOLVING BOTH NIPPLE AND AREOLA OF LEFT BREAST IN FEMALE, ESTROGEN RECEPTOR POSITIVE (H): ICD-10-CM

## 2025-08-26 DIAGNOSIS — F32.2 CURRENT SEVERE EPISODE OF MAJOR DEPRESSIVE DISORDER WITHOUT PSYCHOTIC FEATURES WITHOUT PRIOR EPISODE (H): ICD-10-CM

## 2025-08-26 DIAGNOSIS — Z17.0 MALIGNANT NEOPLASM INVOLVING BOTH NIPPLE AND AREOLA OF LEFT BREAST IN FEMALE, ESTROGEN RECEPTOR POSITIVE (H): ICD-10-CM

## 2025-08-26 RX ORDER — LETROZOLE 2.5 MG/1
2.5 TABLET, FILM COATED ORAL DAILY
Qty: 30 TABLET | Refills: 11 | Status: CANCELLED | OUTPATIENT
Start: 2025-08-26

## (undated) DEVICE — Device

## (undated) DEVICE — SU VICRYL 2-0 SH 27" UND J417H

## (undated) DEVICE — DRSG TELFA 3X8" 1238

## (undated) DEVICE — SU VICRYL 4-0 PS-2 18" UND J496H

## (undated) DEVICE — DRSG STERI STRIP 1/2X4" R1547

## (undated) DEVICE — GLOVE BIOGEL PI MICRO INDICATOR UNDERGLOVE SZ 7.0 48970

## (undated) DEVICE — ESU ELEC BLADE 2.75" COATED/INSULATED E1455

## (undated) DEVICE — CLIP HORIZON SM RED WIDE SLOT 001201

## (undated) DEVICE — COVER ULTRASOUND PROBE GAMMA WIRELESS TMPH-2002

## (undated) DEVICE — PREP CHLORAPREP 26ML TINTED HI-LITE ORANGE 930815

## (undated) DEVICE — DRAPE U SPLIT 74X120" 29440

## (undated) DEVICE — SU MONOCRYL 5-0 TF 27" UND Y433H

## (undated) DEVICE — NDL 30GA 0.5" 305106

## (undated) DEVICE — DRSG PRIMAPORE 03 1/8X6" 66000318

## (undated) DEVICE — ESU PENCIL SMOKE EVAC W/ROCKER SWITCH 0703-047-000

## (undated) DEVICE — COVER ULTRASOUND PROBE W/GEL FLEXI-FEEL 6"X58" LF  25-FF658

## (undated) DEVICE — GLOVE BIOGEL PI MICRO SZ 6.5 48565

## (undated) DEVICE — LABEL MEDICATION SYSTEM 3303-P

## (undated) DEVICE — SU MONOCRYL 5-0 P-3 18" UND Y493G

## (undated) DEVICE — LINEN TOWEL PACK X30 5481

## (undated) DEVICE — CUP AND LID 2PK 2OZ STERILE  SSK9006A

## (undated) DEVICE — ESU HARMONIC FOCUS SHEARS 9CM HAR9F

## (undated) DEVICE — DRAPE LAP W/ARMBOARD 29410

## (undated) DEVICE — ADH LIQUID MASTISOL TOPICAL VIAL 2-3ML 0523-48

## (undated) DEVICE — STRAP UNIVERSAL POSITIONING 2-PIECE 4X47X76" 91-287

## (undated) DEVICE — SU VICRYL 3-0 SH 27" UND J416H

## (undated) DEVICE — DRSG KERLIX 4 1/2"X4YDS ROLL 6715

## (undated) DEVICE — SUCTION MANIFOLD NEPTUNE 2 SYS 4 PORT 0702-020-000

## (undated) DEVICE — LINEN TOWEL PACK X6 WHITE 5487

## (undated) DEVICE — SYR 10ML FINGER CONTROL W/O NDL 309695

## (undated) DEVICE — SYR 05ML LL W/O NDL

## (undated) DEVICE — CLIP HORIZON MED BLUE 002200

## (undated) DEVICE — DRAPE CONVERTORS U-DRAPE 60X72" 8476

## (undated) DEVICE — GOWN IMPERVIOUS BREATHABLE SMART LG 89015

## (undated) DEVICE — ESU GROUND PAD ADULT W/CORD E7507

## (undated) RX ORDER — FENTANYL CITRATE 50 UG/ML
INJECTION, SOLUTION INTRAMUSCULAR; INTRAVENOUS
Status: DISPENSED
Start: 2024-04-05

## (undated) RX ORDER — PROPOFOL 10 MG/ML
INJECTION, EMULSION INTRAVENOUS
Status: DISPENSED
Start: 2024-04-05

## (undated) RX ORDER — OXYCODONE HYDROCHLORIDE 5 MG/1
TABLET ORAL
Status: DISPENSED
Start: 2024-04-05

## (undated) RX ORDER — EPHEDRINE SULFATE 50 MG/ML
INJECTION, SOLUTION INTRAMUSCULAR; INTRAVENOUS; SUBCUTANEOUS
Status: DISPENSED
Start: 2024-04-05

## (undated) RX ORDER — APREPITANT 40 MG/1
CAPSULE ORAL
Status: DISPENSED
Start: 2024-04-05

## (undated) RX ORDER — ONDANSETRON 2 MG/ML
INJECTION INTRAMUSCULAR; INTRAVENOUS
Status: DISPENSED
Start: 2024-04-05

## (undated) RX ORDER — CEFAZOLIN SODIUM/WATER 2 G/20 ML
SYRINGE (ML) INTRAVENOUS
Status: DISPENSED
Start: 2024-04-05

## (undated) RX ORDER — BUPIVACAINE HYDROCHLORIDE AND EPINEPHRINE 2.5; 5 MG/ML; UG/ML
INJECTION, SOLUTION EPIDURAL; INFILTRATION; INTRACAUDAL; PERINEURAL
Status: DISPENSED
Start: 2024-04-05

## (undated) RX ORDER — LIDOCAINE HYDROCHLORIDE 10 MG/ML
INJECTION, SOLUTION EPIDURAL; INFILTRATION; INTRACAUDAL; PERINEURAL
Status: DISPENSED
Start: 2024-04-05

## (undated) RX ORDER — ACETAMINOPHEN 325 MG/1
TABLET ORAL
Status: DISPENSED
Start: 2024-04-05

## (undated) RX ORDER — CEFAZOLIN SODIUM 1 G/3ML
INJECTION, POWDER, FOR SOLUTION INTRAMUSCULAR; INTRAVENOUS
Status: DISPENSED
Start: 2024-04-05

## (undated) RX ORDER — FENTANYL CITRATE-0.9 % NACL/PF 10 MCG/ML
PLASTIC BAG, INJECTION (ML) INTRAVENOUS
Status: DISPENSED
Start: 2024-04-05

## (undated) RX ORDER — ISOSULFAN BLUE 50 MG/5ML
INJECTION, SOLUTION SUBCUTANEOUS
Status: DISPENSED
Start: 2024-04-05

## (undated) RX ORDER — HYDROMORPHONE HCL IN WATER/PF 6 MG/30 ML
PATIENT CONTROLLED ANALGESIA SYRINGE INTRAVENOUS
Status: DISPENSED
Start: 2024-04-05